# Patient Record
Sex: FEMALE | Race: WHITE | NOT HISPANIC OR LATINO | Employment: OTHER | URBAN - METROPOLITAN AREA
[De-identification: names, ages, dates, MRNs, and addresses within clinical notes are randomized per-mention and may not be internally consistent; named-entity substitution may affect disease eponyms.]

---

## 2017-06-01 ENCOUNTER — HOSPITAL ENCOUNTER (OUTPATIENT)
Dept: CARDIOLOGY | Facility: HOSPITAL | Age: 82
Discharge: HOME | End: 2017-06-01
Attending: PHYSICIAN ASSISTANT | Admitting: PHYSICIAN ASSISTANT

## 2019-06-17 PROBLEM — K21.9 GASTROESOPHAGEAL REFLUX DISEASE: Status: ACTIVE | Noted: 2019-06-17

## 2019-06-17 PROBLEM — R60.0 EDEMA OF LOWER EXTREMITY: Status: ACTIVE | Noted: 2017-05-17

## 2019-06-17 PROBLEM — F41.9 ANXIETY DISORDER: Status: ACTIVE | Noted: 2019-06-17

## 2019-07-25 PROCEDURE — 87186 SC STD MICRODIL/AGAR DIL: CPT | Performed by: NURSE PRACTITIONER

## 2019-07-25 PROCEDURE — 87086 URINE CULTURE/COLONY COUNT: CPT | Performed by: NURSE PRACTITIONER

## 2019-07-25 PROCEDURE — 87088 URINE BACTERIA CULTURE: CPT | Performed by: NURSE PRACTITIONER

## 2019-09-11 ENCOUNTER — TELEPHONE (OUTPATIENT)
Dept: FAMILY MEDICINE CLINIC | Facility: CLINIC | Age: 84
End: 2019-09-11

## 2019-09-11 NOTE — TELEPHONE ENCOUNTER
DAUGHTER, IDA, IS ASKING IF YOU WOULD ACCEPT LEVI AS PATIENT. SHE STATES SHE TEXTED YOU ABOUT THIS. THANK YOU.

## 2019-09-11 NOTE — TELEPHONE ENCOUNTER
Lm on Luz Elena's cell that he will accept as patient and to call office to schedule new patient establishment appt.

## 2019-10-06 ENCOUNTER — LAB REQUISITION (OUTPATIENT)
Dept: LAB | Facility: HOSPITAL | Age: 84
End: 2019-10-06

## 2019-10-06 DIAGNOSIS — Z00.00 ENCOUNTER FOR GENERAL ADULT MEDICAL EXAMINATION WITHOUT ABNORMAL FINDINGS: ICD-10-CM

## 2019-10-06 PROCEDURE — 87205 SMEAR GRAM STAIN: CPT | Performed by: INTERNAL MEDICINE

## 2019-10-06 PROCEDURE — 87186 SC STD MICRODIL/AGAR DIL: CPT | Performed by: INTERNAL MEDICINE

## 2019-10-06 PROCEDURE — 87070 CULTURE OTHR SPECIMN AEROBIC: CPT | Performed by: INTERNAL MEDICINE

## 2019-10-07 ENCOUNTER — HOSPITAL ENCOUNTER (OUTPATIENT)
Facility: HOSPITAL | Age: 84
Setting detail: OBSERVATION
Discharge: HOME HEALTH | End: 2019-10-08
Attending: EMERGENCY MEDICINE | Admitting: HOSPITALIST

## 2019-10-07 DIAGNOSIS — L03.115 CELLULITIS OF RIGHT LOWER EXTREMITY: Primary | ICD-10-CM

## 2019-10-07 DIAGNOSIS — N17.0 ACUTE KIDNEY INJURY (AKI) WITH ACUTE TUBULAR NECROSIS (ATN) (HCC): ICD-10-CM

## 2019-10-07 PROBLEM — F41.9 ANXIETY DISORDER: Chronic | Status: ACTIVE | Noted: 2019-06-17

## 2019-10-07 PROBLEM — R60.0 EDEMA OF LOWER EXTREMITY: Chronic | Status: ACTIVE | Noted: 2017-05-17

## 2019-10-07 PROBLEM — K21.9 GASTROESOPHAGEAL REFLUX DISEASE: Chronic | Status: ACTIVE | Noted: 2019-06-17

## 2019-10-07 LAB
ALBUMIN SERPL-MCNC: 3.4 G/DL (ref 3.5–4.8)
ALBUMIN/GLOB SERPL: 1 G/DL (ref 1–1.7)
ALP SERPL-CCNC: 58 U/L (ref 32–91)
ALT SERPL W P-5'-P-CCNC: 6 U/L (ref 14–54)
ANION GAP SERPL CALCULATED.3IONS-SCNC: 14.9 MMOL/L (ref 5–15)
AST SERPL-CCNC: 33 U/L (ref 15–41)
B PERT DNA SPEC QL NAA+PROBE: NOT DETECTED
BACTERIA UR QL AUTO: ABNORMAL /HPF
BASOPHILS # BLD AUTO: 0 10*3/MM3 (ref 0–0.2)
BASOPHILS NFR BLD AUTO: 0.2 % (ref 0–1.5)
BILIRUB SERPL-MCNC: 0.7 MG/DL (ref 0.3–1.2)
BILIRUB UR QL STRIP: NEGATIVE
BNP SERPL-MCNC: 80 PG/ML
BUN BLD-MCNC: 29 MG/DL (ref 8–20)
BUN/CREAT SERPL: 19.3 (ref 5.4–26.2)
C PNEUM DNA NPH QL NAA+NON-PROBE: NOT DETECTED
CALCIUM SPEC-SCNC: 8.7 MG/DL (ref 8.9–10.3)
CHLORIDE SERPL-SCNC: 106 MMOL/L (ref 101–111)
CLARITY UR: CLEAR
CO2 SERPL-SCNC: 26 MMOL/L (ref 22–32)
COLOR UR: ABNORMAL
CREAT BLD-MCNC: 1.5 MG/DL (ref 0.4–1)
D-LACTATE SERPL-SCNC: 1.1 MMOL/L (ref 0.5–2.2)
D-LACTATE SERPL-SCNC: 1.8 MMOL/L (ref 0.5–2)
DEPRECATED RDW RBC AUTO: 54.7 FL (ref 37–54)
EOSINOPHIL # BLD AUTO: 0 10*3/MM3 (ref 0–0.4)
EOSINOPHIL NFR BLD AUTO: 0.1 % (ref 0.3–6.2)
ERYTHROCYTE [DISTWIDTH] IN BLOOD BY AUTOMATED COUNT: 16.2 % (ref 12.3–15.4)
FLUAV H1 2009 PAND RNA NPH QL NAA+PROBE: NOT DETECTED
FLUAV H1 HA GENE NPH QL NAA+PROBE: NOT DETECTED
FLUAV H3 RNA NPH QL NAA+PROBE: NOT DETECTED
FLUAV SUBTYP SPEC NAA+PROBE: NOT DETECTED
FLUBV RNA ISLT QL NAA+PROBE: NOT DETECTED
GFR SERPL CREATININE-BSD FRML MDRD: 33 ML/MIN/1.73
GLOBULIN UR ELPH-MCNC: 3.3 GM/DL (ref 2.5–3.8)
GLUCOSE BLD-MCNC: 134 MG/DL (ref 65–99)
GLUCOSE UR STRIP-MCNC: NEGATIVE MG/DL
HADV DNA SPEC NAA+PROBE: NOT DETECTED
HCOV 229E RNA SPEC QL NAA+PROBE: NOT DETECTED
HCOV HKU1 RNA SPEC QL NAA+PROBE: NOT DETECTED
HCOV NL63 RNA SPEC QL NAA+PROBE: NOT DETECTED
HCOV OC43 RNA SPEC QL NAA+PROBE: NOT DETECTED
HCT VFR BLD AUTO: 27.5 % (ref 34–46.6)
HGB BLD-MCNC: 9.5 G/DL (ref 12–15.9)
HGB UR QL STRIP.AUTO: ABNORMAL
HMPV RNA NPH QL NAA+NON-PROBE: NOT DETECTED
HOLD SPECIMEN: NORMAL
HOLD SPECIMEN: NORMAL
HPIV1 RNA SPEC QL NAA+PROBE: NOT DETECTED
HPIV2 RNA SPEC QL NAA+PROBE: NOT DETECTED
HPIV3 RNA NPH QL NAA+PROBE: NOT DETECTED
HPIV4 P GENE NPH QL NAA+PROBE: NOT DETECTED
HYALINE CASTS UR QL AUTO: ABNORMAL /LPF
KETONES UR QL STRIP: NEGATIVE
LEUKOCYTE ESTERASE UR QL STRIP.AUTO: ABNORMAL
LYMPHOCYTES # BLD AUTO: 0.4 10*3/MM3 (ref 0.7–3.1)
LYMPHOCYTES NFR BLD AUTO: 4.1 % (ref 19.6–45.3)
M PNEUMO IGG SER IA-ACNC: NOT DETECTED
MCH RBC QN AUTO: 32.6 PG (ref 26.6–33)
MCHC RBC AUTO-ENTMCNC: 34.4 G/DL (ref 31.5–35.7)
MCV RBC AUTO: 94.7 FL (ref 79–97)
MONOCYTES # BLD AUTO: 0.5 10*3/MM3 (ref 0.1–0.9)
MONOCYTES NFR BLD AUTO: 5.4 % (ref 5–12)
NEUTROPHILS # BLD AUTO: 8.9 10*3/MM3 (ref 1.7–7)
NEUTROPHILS NFR BLD AUTO: 90.2 % (ref 42.7–76)
NITRITE UR QL STRIP: NEGATIVE
NRBC BLD AUTO-RTO: 0 /100 WBC (ref 0–0.2)
PH UR STRIP.AUTO: 5.5 [PH] (ref 5–8)
PLATELET # BLD AUTO: 177 10*3/MM3 (ref 140–450)
PMV BLD AUTO: 7.9 FL (ref 6–12)
POTASSIUM BLD-SCNC: 4.9 MMOL/L (ref 3.6–5.1)
PROT SERPL-MCNC: 6.7 G/DL (ref 6.1–7.9)
PROT UR QL STRIP: ABNORMAL
RBC # BLD AUTO: 2.9 10*6/MM3 (ref 3.77–5.28)
RBC # UR: ABNORMAL /HPF
REF LAB TEST METHOD: ABNORMAL
RHINOVIRUS RNA SPEC NAA+PROBE: NOT DETECTED
RSV RNA NPH QL NAA+NON-PROBE: NOT DETECTED
SODIUM BLD-SCNC: 142 MMOL/L (ref 136–144)
SP GR UR STRIP: 1.02 (ref 1–1.03)
SQUAMOUS #/AREA URNS HPF: ABNORMAL /HPF
UROBILINOGEN UR QL STRIP: ABNORMAL
WBC NRBC COR # BLD: 9.9 10*3/MM3 (ref 3.4–10.8)
WBC UR QL AUTO: ABNORMAL /HPF
WHOLE BLOOD HOLD SPECIMEN: NORMAL
WHOLE BLOOD HOLD SPECIMEN: NORMAL

## 2019-10-07 PROCEDURE — 25010000002 VANCOMYCIN 10 G RECONSTITUTED SOLUTION: Performed by: EMERGENCY MEDICINE

## 2019-10-07 PROCEDURE — 90686 IIV4 VACC NO PRSV 0.5 ML IM: CPT | Performed by: HOSPITALIST

## 2019-10-07 PROCEDURE — 81001 URINALYSIS AUTO W/SCOPE: CPT | Performed by: EMERGENCY MEDICINE

## 2019-10-07 PROCEDURE — 96361 HYDRATE IV INFUSION ADD-ON: CPT

## 2019-10-07 PROCEDURE — 96366 THER/PROPH/DIAG IV INF ADDON: CPT

## 2019-10-07 PROCEDURE — 96365 THER/PROPH/DIAG IV INF INIT: CPT

## 2019-10-07 PROCEDURE — 0099U HC BIOFIRE FILMARRAY RESP PANEL 1: CPT | Performed by: HOSPITALIST

## 2019-10-07 PROCEDURE — G0378 HOSPITAL OBSERVATION PER HR: HCPCS

## 2019-10-07 PROCEDURE — 99283 EMERGENCY DEPT VISIT LOW MDM: CPT

## 2019-10-07 PROCEDURE — P9612 CATHETERIZE FOR URINE SPEC: HCPCS

## 2019-10-07 PROCEDURE — 96372 THER/PROPH/DIAG INJ SC/IM: CPT

## 2019-10-07 PROCEDURE — G0008 ADMIN INFLUENZA VIRUS VAC: HCPCS | Performed by: HOSPITALIST

## 2019-10-07 PROCEDURE — 83880 ASSAY OF NATRIURETIC PEPTIDE: CPT | Performed by: NURSE PRACTITIONER

## 2019-10-07 PROCEDURE — 99220 PR INITIAL OBSERVATION CARE/DAY 70 MINUTES: CPT | Performed by: HOSPITALIST

## 2019-10-07 PROCEDURE — 87040 BLOOD CULTURE FOR BACTERIA: CPT | Performed by: EMERGENCY MEDICINE

## 2019-10-07 PROCEDURE — 83605 ASSAY OF LACTIC ACID: CPT

## 2019-10-07 PROCEDURE — 25010000002 PIPERACILLIN SOD-TAZOBACTAM PER 1 G: Performed by: EMERGENCY MEDICINE

## 2019-10-07 PROCEDURE — 96367 TX/PROPH/DG ADDL SEQ IV INF: CPT

## 2019-10-07 PROCEDURE — 83605 ASSAY OF LACTIC ACID: CPT | Performed by: NURSE PRACTITIONER

## 2019-10-07 PROCEDURE — 25010000002 INFLUENZA VAC SPLIT QUAD 0.5 ML SUSPENSION PREFILLED SYRINGE: Performed by: HOSPITALIST

## 2019-10-07 PROCEDURE — 25010000002 ENOXAPARIN PER 10 MG: Performed by: NURSE PRACTITIONER

## 2019-10-07 PROCEDURE — 85025 COMPLETE CBC W/AUTO DIFF WBC: CPT | Performed by: EMERGENCY MEDICINE

## 2019-10-07 PROCEDURE — 80053 COMPREHEN METABOLIC PANEL: CPT | Performed by: EMERGENCY MEDICINE

## 2019-10-07 PROCEDURE — 25010000002 CEFEPIME PER 500 MG: Performed by: HOSPITALIST

## 2019-10-07 RX ORDER — SENNOSIDES 8.6 MG
1 TABLET ORAL 2 TIMES DAILY
COMMUNITY

## 2019-10-07 RX ORDER — ACETAMINOPHEN 160 MG/5ML
650 SOLUTION ORAL EVERY 4 HOURS PRN
Status: DISCONTINUED | OUTPATIENT
Start: 2019-10-07 | End: 2019-10-08 | Stop reason: HOSPADM

## 2019-10-07 RX ORDER — SODIUM CHLORIDE 9 MG/ML
75 INJECTION, SOLUTION INTRAVENOUS ONCE
Status: COMPLETED | OUTPATIENT
Start: 2019-10-07 | End: 2019-10-07

## 2019-10-07 RX ORDER — HYDROCODONE BITARTRATE AND ACETAMINOPHEN 10; 325 MG/1; MG/1
1 TABLET ORAL EVERY 6 HOURS
COMMUNITY

## 2019-10-07 RX ORDER — PREGABALIN 50 MG/1
50 CAPSULE ORAL 2 TIMES DAILY
Status: ON HOLD | COMMUNITY
End: 2022-10-23

## 2019-10-07 RX ORDER — PANTOPRAZOLE SODIUM 40 MG/1
40 TABLET, DELAYED RELEASE ORAL
Status: DISCONTINUED | OUTPATIENT
Start: 2019-10-07 | End: 2019-10-08 | Stop reason: HOSPADM

## 2019-10-07 RX ORDER — PREGABALIN 75 MG/1
CAPSULE ORAL
Status: ON HOLD | COMMUNITY
Start: 2011-10-24 | End: 2019-10-07

## 2019-10-07 RX ORDER — ERGOCALCIFEROL 1.25 MG/1
50000 CAPSULE ORAL WEEKLY
COMMUNITY

## 2019-10-07 RX ORDER — BACLOFEN 10 MG/1
5 TABLET ORAL
Status: ON HOLD | COMMUNITY
End: 2022-10-23

## 2019-10-07 RX ORDER — LEVOTHYROXINE SODIUM 137 UG/1
137 TABLET ORAL DAILY
COMMUNITY

## 2019-10-07 RX ORDER — SIMETHICONE 80 MG
80 TABLET,CHEWABLE ORAL EVERY 6 HOURS PRN
Status: DISCONTINUED | OUTPATIENT
Start: 2019-10-07 | End: 2019-10-08 | Stop reason: HOSPADM

## 2019-10-07 RX ORDER — BACLOFEN 10 MG/1
5 TABLET ORAL 3 TIMES DAILY
Status: DISCONTINUED | OUTPATIENT
Start: 2019-10-07 | End: 2019-10-08 | Stop reason: HOSPADM

## 2019-10-07 RX ORDER — SODIUM CHLORIDE 9 MG/ML
75 INJECTION, SOLUTION INTRAVENOUS CONTINUOUS
Status: DISCONTINUED | OUTPATIENT
Start: 2019-10-07 | End: 2019-10-08 | Stop reason: HOSPADM

## 2019-10-07 RX ORDER — ROPINIROLE 3 MG/1
3 TABLET, FILM COATED ORAL 2 TIMES DAILY
COMMUNITY
End: 2022-10-25 | Stop reason: HOSPADM

## 2019-10-07 RX ORDER — SODIUM CHLORIDE 0.9 % (FLUSH) 0.9 %
10 SYRINGE (ML) INJECTION AS NEEDED
Status: DISCONTINUED | OUTPATIENT
Start: 2019-10-07 | End: 2019-10-08 | Stop reason: HOSPADM

## 2019-10-07 RX ORDER — HYDROCODONE BITARTRATE AND ACETAMINOPHEN 7.5; 325 MG/1; MG/1
1 TABLET ORAL EVERY 6 HOURS PRN
Status: DISCONTINUED | OUTPATIENT
Start: 2019-10-07 | End: 2019-10-08 | Stop reason: HOSPADM

## 2019-10-07 RX ORDER — LANOLIN ALCOHOL/MO/W.PET/CERES
1000 CREAM (GRAM) TOPICAL DAILY
Status: DISCONTINUED | OUTPATIENT
Start: 2019-10-07 | End: 2019-10-08 | Stop reason: HOSPADM

## 2019-10-07 RX ORDER — CHOLECALCIFEROL (VITAMIN D3) 125 MCG
5 CAPSULE ORAL NIGHTLY
COMMUNITY
End: 2020-10-11 | Stop reason: HOSPADM

## 2019-10-07 RX ORDER — FAMOTIDINE 20 MG/1
20 TABLET, FILM COATED ORAL DAILY
Status: DISCONTINUED | OUTPATIENT
Start: 2019-10-07 | End: 2019-10-07

## 2019-10-07 RX ORDER — RANITIDINE HCL 75 MG
75 TABLET ORAL 2 TIMES DAILY
COMMUNITY
End: 2020-07-22

## 2019-10-07 RX ORDER — FLUTICASONE PROPIONATE 50 MCG
2 SPRAY, SUSPENSION (ML) NASAL DAILY
Status: DISCONTINUED | OUTPATIENT
Start: 2019-10-07 | End: 2019-10-08 | Stop reason: HOSPADM

## 2019-10-07 RX ORDER — ACETAMINOPHEN 325 MG/1
650 TABLET ORAL EVERY 4 HOURS PRN
Status: DISCONTINUED | OUTPATIENT
Start: 2019-10-07 | End: 2019-10-08 | Stop reason: HOSPADM

## 2019-10-07 RX ORDER — FLUTICASONE PROPIONATE 50 MCG
1 SPRAY, SUSPENSION (ML) NASAL DAILY
Status: ON HOLD | COMMUNITY
End: 2022-10-23

## 2019-10-07 RX ORDER — ASCORBIC ACID 500 MG
500 TABLET ORAL DAILY
Status: ON HOLD | COMMUNITY
End: 2022-10-23

## 2019-10-07 RX ORDER — LUBIPROSTONE 24 UG/1
24 CAPSULE ORAL 2 TIMES DAILY WITH MEALS
COMMUNITY

## 2019-10-07 RX ORDER — FUROSEMIDE 40 MG/1
40 TABLET ORAL
Status: DISCONTINUED | OUTPATIENT
Start: 2019-10-07 | End: 2019-10-07

## 2019-10-07 RX ORDER — CETIRIZINE HYDROCHLORIDE 10 MG/1
10 TABLET ORAL
COMMUNITY

## 2019-10-07 RX ORDER — ONDANSETRON 4 MG/1
4 TABLET, FILM COATED ORAL EVERY 6 HOURS PRN
Status: DISCONTINUED | OUTPATIENT
Start: 2019-10-07 | End: 2019-10-08 | Stop reason: HOSPADM

## 2019-10-07 RX ORDER — FUROSEMIDE 40 MG/1
40 TABLET ORAL DAILY
Status: ON HOLD | COMMUNITY
End: 2019-10-08 | Stop reason: SDUPTHER

## 2019-10-07 RX ORDER — SODIUM CHLORIDE 0.9 % (FLUSH) 0.9 %
10 SYRINGE (ML) INJECTION EVERY 12 HOURS SCHEDULED
Status: DISCONTINUED | OUTPATIENT
Start: 2019-10-07 | End: 2019-10-08 | Stop reason: HOSPADM

## 2019-10-07 RX ORDER — ONDANSETRON 2 MG/ML
4 INJECTION INTRAMUSCULAR; INTRAVENOUS EVERY 6 HOURS PRN
Status: DISCONTINUED | OUTPATIENT
Start: 2019-10-07 | End: 2019-10-08 | Stop reason: HOSPADM

## 2019-10-07 RX ORDER — FENOFIBRATE 145 MG/1
145 TABLET, COATED ORAL DAILY
COMMUNITY

## 2019-10-07 RX ORDER — ROPINIROLE 1 MG/1
3 TABLET, FILM COATED ORAL NIGHTLY
Status: DISCONTINUED | OUTPATIENT
Start: 2019-10-07 | End: 2019-10-08 | Stop reason: HOSPADM

## 2019-10-07 RX ORDER — ACETAMINOPHEN 650 MG/1
650 SUPPOSITORY RECTAL EVERY 4 HOURS PRN
Status: DISCONTINUED | OUTPATIENT
Start: 2019-10-07 | End: 2019-10-08 | Stop reason: HOSPADM

## 2019-10-07 RX ORDER — ALLOPURINOL 100 MG/1
100 TABLET ORAL DAILY
COMMUNITY

## 2019-10-07 RX ORDER — ALLOPURINOL 100 MG/1
100 TABLET ORAL DAILY
Status: DISCONTINUED | OUTPATIENT
Start: 2019-10-07 | End: 2019-10-08 | Stop reason: HOSPADM

## 2019-10-07 RX ORDER — POTASSIUM CHLORIDE 20 MEQ/1
20 TABLET, EXTENDED RELEASE ORAL 2 TIMES DAILY
Status: ON HOLD | COMMUNITY
End: 2019-10-08 | Stop reason: SDUPTHER

## 2019-10-07 RX ORDER — DIAPER,BRIEF,INFANT-TODD,DISP
EACH MISCELLANEOUS
Status: DISCONTINUED | OUTPATIENT
Start: 2019-10-07 | End: 2019-10-08 | Stop reason: HOSPADM

## 2019-10-07 RX ORDER — BACLOFEN 10 MG/1
5 TABLET ORAL 3 TIMES DAILY
Status: ON HOLD | COMMUNITY
End: 2019-10-07

## 2019-10-07 RX ORDER — LANOLIN ALCOHOL/MO/W.PET/CERES
1000 CREAM (GRAM) TOPICAL DAILY
COMMUNITY
End: 2020-10-09

## 2019-10-07 RX ORDER — PREGABALIN 25 MG/1
25 CAPSULE ORAL DAILY
Status: DISCONTINUED | OUTPATIENT
Start: 2019-10-07 | End: 2019-10-07

## 2019-10-07 RX ORDER — PREGABALIN 25 MG/1
50 CAPSULE ORAL EVERY 12 HOURS SCHEDULED
Status: DISCONTINUED | OUTPATIENT
Start: 2019-10-07 | End: 2019-10-08 | Stop reason: HOSPADM

## 2019-10-07 RX ORDER — POLYETHYLENE GLYCOL 3350 17 G/17G
17 POWDER, FOR SOLUTION ORAL EVERY MORNING
COMMUNITY

## 2019-10-07 RX ORDER — SIMETHICONE 80 MG
80 TABLET,CHEWABLE ORAL EVERY 6 HOURS PRN
COMMUNITY

## 2019-10-07 RX ORDER — SODIUM CHLORIDE 9 MG/ML
100 INJECTION, SOLUTION INTRAVENOUS CONTINUOUS
Status: DISCONTINUED | OUTPATIENT
Start: 2019-10-07 | End: 2019-10-07

## 2019-10-07 RX ORDER — HYDROCODONE BITARTRATE AND ACETAMINOPHEN 10; 325 MG/1; MG/1
1 TABLET ORAL EVERY 6 HOURS PRN
Status: DISCONTINUED | OUTPATIENT
Start: 2019-10-07 | End: 2019-10-07

## 2019-10-07 RX ORDER — LEVOTHYROXINE SODIUM 0.05 MG/1
50 TABLET ORAL
Status: DISCONTINUED | OUTPATIENT
Start: 2019-10-07 | End: 2019-10-07

## 2019-10-07 RX ADMIN — HYDROCODONE BITARTRATE AND ACETAMINOPHEN 1 TABLET: 7.5; 325 TABLET ORAL at 10:57

## 2019-10-07 RX ADMIN — PREGABALIN 50 MG: 25 CAPSULE ORAL at 21:32

## 2019-10-07 RX ADMIN — CEFEPIME HYDROCHLORIDE 1 G: 1 INJECTION, POWDER, FOR SOLUTION INTRAMUSCULAR; INTRAVENOUS at 11:13

## 2019-10-07 RX ADMIN — ROPINIROLE 3 MG: 1 TABLET, FILM COATED ORAL at 21:32

## 2019-10-07 RX ADMIN — SODIUM CHLORIDE 100 ML/HR: 0.9 INJECTION, SOLUTION INTRAVENOUS at 06:29

## 2019-10-07 RX ADMIN — Medication 10 ML: at 21:32

## 2019-10-07 RX ADMIN — ALLOPURINOL 100 MG: 100 TABLET ORAL at 10:58

## 2019-10-07 RX ADMIN — SODIUM CHLORIDE 75 ML/HR: 0.9 INJECTION, SOLUTION INTRAVENOUS at 09:32

## 2019-10-07 RX ADMIN — BACLOFEN 5 MG: 10 TABLET ORAL at 10:58

## 2019-10-07 RX ADMIN — ACETAMINOPHEN 650 MG: 325 TABLET ORAL at 21:32

## 2019-10-07 RX ADMIN — CYANOCOBALAMIN TAB 1000 MCG 1000 MCG: 1000 TAB at 10:57

## 2019-10-07 RX ADMIN — VANCOMYCIN HYDROCHLORIDE 1250 MG: 10 INJECTION, POWDER, LYOPHILIZED, FOR SOLUTION INTRAVENOUS at 05:04

## 2019-10-07 RX ADMIN — INFLUENZA A VIRUS A/BRISBANE/02/2018 IVR-190 (H1N1) ANTIGEN (PROPIOLACTONE INACTIVATED), INFLUENZA A VIRUS A/KANSAS/14/2017 X-327 (H3N2) ANTIGEN (PROPIOLACTONE INACTIVATED), INFLUENZA B VIRUS B/MARYLAND/15/2016 ANTIGEN (PROPIOLACTONE INACTIVATED), INFLUENZA B VIRUS B/PHUKET/3073/2013 BVR-1B ANTIGEN (PROPIOLACTONE INACTIVATED) 0.5 ML: 15; 15; 15; 15 INJECTION, SUSPENSION INTRAMUSCULAR at 10:55

## 2019-10-07 RX ADMIN — BACLOFEN 5 MG: 10 TABLET ORAL at 21:32

## 2019-10-07 RX ADMIN — ENOXAPARIN SODIUM 30 MG: 30 INJECTION SUBCUTANEOUS at 17:17

## 2019-10-07 RX ADMIN — MAGNESIUM OXIDE TAB 400 MG (241.3 MG ELEMENTAL MG) 400 MG: 400 (241.3 MG) TAB at 10:57

## 2019-10-07 RX ADMIN — SODIUM CHLORIDE 40 ML/HR: 0.9 INJECTION, SOLUTION INTRAVENOUS at 11:11

## 2019-10-07 RX ADMIN — PANTOPRAZOLE SODIUM 40 MG: 40 TABLET, DELAYED RELEASE ORAL at 10:57

## 2019-10-07 RX ADMIN — CARBIDOPA AND LEVODOPA 1 TABLET: 25; 100 TABLET ORAL at 10:58

## 2019-10-07 RX ADMIN — CARBIDOPA AND LEVODOPA 1 TABLET: 25; 100 TABLET ORAL at 21:32

## 2019-10-07 RX ADMIN — Medication 10 ML: at 09:31

## 2019-10-07 RX ADMIN — PIPERACILLIN AND TAZOBACTAM 3.38 G: 3; .375 INJECTION, POWDER, LYOPHILIZED, FOR SOLUTION INTRAVENOUS at 04:36

## 2019-10-07 RX ADMIN — PREGABALIN 50 MG: 25 CAPSULE ORAL at 10:56

## 2019-10-07 RX ADMIN — SODIUM CHLORIDE 1000 ML: 900 INJECTION, SOLUTION INTRAVENOUS at 04:19

## 2019-10-07 NOTE — ED NOTES
Pt requesting pain medication for her legs.  Discussed with provider and no orders given at this time due to blood pressure. Family and patient notified     Franca Pope RN  10/07/19 0511

## 2019-10-07 NOTE — H&P
"      [facility] Medicine Services            Primary Care Provider:  Leobardo Kim APRN    Patient Care Team:  Leobardo Kim APRN as PCP - General    CHIEF COMPLAINT: \"I have a wound behind my knee.  How do I order my breakfast?\"    Chief Complaint   Patient presents with   • Wound Check     HISTORY OF PRESENT ILLNESS:    Ms. Owens is an 84 year old female with a past medical history significant for Gout, chronic depression, chronic pain with narcotic dependency, chronic HTN, asthma, Parkinson's disease,chronic constipation, hypothyroidism, GERD, anemia, Fibromyalgia, arthritis, diastolic heart failure with preserved EF, chronic venous stasis, and wheelchair dependent.      She reported to the ER today with complaints of a wound behind her knee.  At first she indicated it was behind her left knee, but I could not appreciate any type of wound.  She then said well no, its on my right leg.  I told her that I noted chronic changes to her lower extremities, but did not appreciate any type of open wound.  Additionally, she can not tell me if she was seen by her PCP or if she has been on any antibiotics up to this point.  According to ER notes, she was placed on a course of Clindamycin for suspected cellulitis.      Work up in the ER showed creatinine 1.5, WBC 9.9, Lactic 1.8. Urinalysis showed hematuria, leukocytosis, and proteinuria.  She was treated for a UTI in July with a culture positive for E.coli.  She denies any dysuria, fevers, chills, nausea, or diarrhea.  She is very sleepy and is having a hard time holding a conversation.  She states she is \"just tired.\"  Past admission showed hypercapnic respiratory failure thought to be due to polypharmacy.  She was requesting additional pain medication in the ER which she did not receive secondary to some noted hypotension.  Systolic's were in the low 100's.     She received a dose of Vancomycin and Zosyn for suspected cellulitis in " the ER.  She will be admitted for further work up and evaluation.       Past Medical History:   Diagnosis Date   • Acute kidney injury (MIRI) with acute tubular necrosis (ATN) (CMS/HCC) 10/7/2019   • Anemia 4/4/2013   • Anxiety disorder 6/17/2019   • Cellulitis of right lower extremity 10/7/2019   • Depression 1/23/2013   • Edema of lower extremity 5/17/2017   • Fibromyalgia 7/19/2013   • Gastroesophageal reflux disease 6/17/2019   • Hemorrhoids 4/3/2012   • Hyperlipidemia 7/19/2013   • Hypothyroidism 7/19/2013   • Low back pain 2/24/2016   • Peripheral venous insufficiency 9/26/2011   • Polyosteoarthritis 12/5/2011   • Sleep apnea 6/21/2013   • Wheelchair dependence 7/19/2013       No past surgical history on file.    No family history on file.    Social History     Tobacco Use   • Smoking status: Never Smoker   • Smokeless tobacco: Never Used   Substance Use Topics   • Alcohol use: No     Frequency: Never   • Drug use: No       Medications Prior to Admission   Medication Sig Dispense Refill Last Dose   • allopurinol (ZYLOPRIM) 100 MG tablet Take 100 mg by mouth Daily.      • baclofen (LIORESAL) 10 MG tablet Take 10 mg by mouth 3 (Three) Times a Day.      • cetirizine (zyrTEC) 10 MG tablet Take 10 mg by mouth Daily.      • fenofibrate (TRICOR) 145 MG tablet Take 145 mg by mouth Daily.      • fluticasone (FLONASE) 50 MCG/ACT nasal spray 2 sprays into the nostril(s) as directed by provider Daily.      • furosemide (LASIX) 40 MG tablet Take 40 mg by mouth 2 (Two) Times a Day.      • lubiprostone (AMITIZA) 24 MCG capsule Take 24 mcg by mouth 2 (Two) Times a Day With Meals.      • magnesium oxide (MAGOX) 400 (241.3 Mg) MG tablet tablet Take 400 mg by mouth Daily.      • melatonin 5 MG tablet tablet Take 5 mg by mouth.      • polyethylene glycol (MIRALAX) packet Take 17 g by mouth Daily.      • potassium chloride (K-DUR,KLOR-CON) 20 MEQ CR tablet Take 20 mEq by mouth 2 (Two) Times a Day.      • pregabalin (LYRICA) 75 MG  "capsule LYRICA 75 MG CAPS      • raNITIdine (ZANTAC) 75 MG tablet Take 75 mg by mouth 2 (Two) Times a Day.      • rOPINIRole (REQUIP) 4 MG tablet Take 3 mg by mouth Every Night.      • senna (SENOKOT) 8.6 MG tablet tablet Take  by mouth Every Night.      • simethicone (MYLICON) 80 MG chewable tablet Chew 80 mg Every 6 (Six) Hours As Needed for Flatulence.      • vitamin B-12 (CYANOCOBALAMIN) 1000 MCG tablet Take 1,000 mcg by mouth Daily.      • vitamin C (ASCORBIC ACID) 500 MG tablet Take 500 mg by mouth Daily.      • vitamin D (ERGOCALCIFEROL) 81488 units capsule capsule Take 50,000 Units by mouth 1 (One) Time Per Week.      • carbidopa-levodopa (SINEMET)  MG per tablet SINEMET  MG TABS      • DULoxetine (CYMBALTA) 60 MG capsule CYMBALTA 60 MG CPEP      • esomeprazole (NexIUM) 40 MG packet NEXIUM PACK      • ferrous gluconate (FERGON) 324 MG tablet FERROUS GLUCONATE 324 (38 Fe) MG TABS      • HYDROcodone-acetaminophen (NORCO)  MG per tablet NORCO  MG TABS      • levothyroxine (SYNTHROID) 100 MCG tablet SYNTHROID 100 MCG TABS      • Levothyroxine Sodium (SYNTHROID PO) SYNTHROID TABS      • mupirocin (BACTROBAN) 2 % cream Apply  topically to the appropriate area as directed 3 (Three) Times a Day. 30 g 0    • pregabalin (LYRICA) 100 MG capsule LYRICA CAPS          Allergies:  Latex    Immunization History   Administered Date(s) Administered   • TD Preservative Free 06/17/2019           REVIEW OF SYSTEMS:     Review of Systems   Constitution: Positive for malaise/fatigue.   Skin: Positive for color change and poor wound healing.   All other systems reviewed and are negative.      Vital Signs  Temp:  [98.1 °F (36.7 °C)-99.3 °F (37.4 °C)] 98.1 °F (36.7 °C)  Heart Rate:  [85-98] 85  Resp:  [16-18] 16  BP: (100-120)/(50-61) 100/50    Flowsheet Rows      First Filed Value   Admission Height  152.4 cm (60\") Documented at 10/07/2019 0228   Admission Weight  90.7 kg (200 lb) Documented at 10/07/2019 " 0228           Physical Exam:    Physical Exam   Constitutional: She appears well-developed.   HENT:   Head: Normocephalic and atraumatic.   Eyes: EOM are normal. Pupils are equal, round, and reactive to light.   Neck: Normal range of motion.   Cardiovascular: Normal rate and regular rhythm.   Pulmonary/Chest: Effort normal and breath sounds normal.   Abdominal: Soft. Bowel sounds are normal.   Musculoskeletal: Normal range of motion. She exhibits edema.   Neurological:   Sleepy, but arousable   Skin: Skin is warm.   Face appears sweaty. Lower extremity darkening of the skin with some skin tears to the right shin.  Left leg cut she states she got from her wheelchair.          Results Review:      I reviewed the patient's new clinical results.    Lab Results (most recent)     Procedure Component Value Units Date/Time    Urinalysis With Culture If Indicated - Urine, Catheter [[id number]]  (Abnormal) Collected:  10/07/19 0534    Specimen:  Urine, Catheter Updated:  10/07/19 0557     Color, UA Dark Yellow     Appearance, UA Clear     pH, UA 5.5     Specific Gravity, UA 1.022     Glucose, UA Negative     Ketones, UA Negative     Bilirubin, UA Negative     Blood, UA Small (1+)     Protein, UA Trace     Leuk Esterase, UA Trace     Nitrite, UA Negative     Urobilinogen, UA 1.0 E.U./dL    Urinalysis, Microscopic Only - Urine, Catheter [[id number]]  (Abnormal) Collected:  10/07/19 0534    Specimen:  Urine, Catheter Updated:  10/07/19 0557     RBC, UA 0-2 /HPF      WBC, UA 3-5 /HPF      Bacteria, UA Trace /HPF      Squamous Epithelial Cells, UA 7-12 /HPF      Hyaline Casts, UA 3-6 /LPF      Methodology Automated Microscopy    [place] Draw [[id number]] Collected:  10/07/19 0418    Specimen:  Blood Updated:  10/07/19 0530    Narrative:       The following orders were created for panel order [place] Draw.  Procedure                               Abnormality         Status                     ---------                                -----------         ------                     Light Blue Top[[id number]]                                   Final result               Green Top (Gel)[[id number]]                                  Final result               Lavender Top[[id number]]                                     Final result               Gold Top - SST[[id number]]                                   Final result                 Please view results for these tests on the individual orders.    Light Blue Top [[id number]] Collected:  10/07/19 0418    Specimen:  Blood Updated:  10/07/19 0530     Extra Tube hold for add-on     Comment: Auto resulted       Green Top (Gel) [[id number]] Collected:  10/07/19 0418    Specimen:  Blood Updated:  10/07/19 0530     Extra Tube Hold for add-ons.     Comment: Auto resulted.       Lavender Top [[id number]] Collected:  10/07/19 0418    Specimen:  Blood Updated:  10/07/19 0530     Extra Tube hold for add-on     Comment: Auto resulted       Gold Top - SST [[id number]] Collected:  10/07/19 0418    Specimen:  Blood Updated:  10/07/19 0530     Extra Tube Hold for add-ons.     Comment: Auto resulted.       Blood Culture - Blood, Wrist, Left [[id number]] Collected:  10/07/19 0418    Specimen:  Blood from Wrist, Left Updated:  10/07/19 0502    Comprehensive Metabolic Panel [[id number]]  (Abnormal) Collected:  10/07/19 0418    Specimen:  Blood Updated:  10/07/19 0457     Glucose 134 mg/dL      BUN 29 mg/dL      Creatinine 1.50 mg/dL      Sodium 142 mmol/L      Potassium 4.9 mmol/L      Comment: Specimen hemolyzed.  Results may be affected.        Chloride 106 mmol/L      CO2 26.0 mmol/L      Calcium 8.7 mg/dL      Total Protein 6.7 g/dL      Albumin 3.40 g/dL      ALT (SGPT) 6 U/L      Comment: Specimen hemolyzed.  Results may be affected.        AST (SGOT) 33 U/L      Comment: Specimen hemolyzed.  Results may be affected.        Alkaline Phosphatase 58 U/L      Total Bilirubin 0.7 mg/dL      Comment: Specimen hemolyzed.  Results may  be affected.        eGFR Non African Amer 33 mL/min/1.73      Globulin 3.3 gm/dL      A/G Ratio 1.0 g/dL      BUN/Creatinine Ratio 19.3     Anion Gap 14.9 mmol/L     Narrative:       The MDRD GFR formula is only valid for adults with stable renal function between ages 18 and 70.    CBC & Differential [[id number]] Collected:  10/07/19 0418    Specimen:  Blood Updated:  10/07/19 0429    Narrative:       The following orders were created for panel order CBC & Differential.  Procedure                               Abnormality         Status                     ---------                               -----------         ------                     CBC Auto Differential[[id number]]        Abnormal            Final result                 Please view results for these tests on the individual orders.    CBC Auto Differential [[id number]]  (Abnormal) Collected:  10/07/19 0418    Specimen:  Blood Updated:  10/07/19 0429     WBC 9.90 10*3/mm3      RBC 2.90 10*6/mm3      Hemoglobin 9.5 g/dL      Hematocrit 27.5 %      MCV 94.7 fL      MCH 32.6 pg      MCHC 34.4 g/dL      RDW 16.2 %      RDW-SD 54.7 fl      MPV 7.9 fL      Platelets 177 10*3/mm3      Neutrophil % 90.2 %      Lymphocyte % 4.1 %      Monocyte % 5.4 %      Eosinophil % 0.1 %      Basophil % 0.2 %      Neutrophils, Absolute 8.90 10*3/mm3      Lymphocytes, Absolute 0.40 10*3/mm3      Monocytes, Absolute 0.50 10*3/mm3      Eosinophils, Absolute 0.00 10*3/mm3      Basophils, Absolute 0.00 10*3/mm3      nRBC 0.0 /100 WBC     POC Lactate [[id number]]  (Normal) Collected:  10/07/19 0426    Specimen:  Blood Updated:  10/07/19 0427     Lactate 1.8 mmol/L      Comment: Serial Number: [id number]:  [id number]       Blood Culture - Blood, Arm, Left [[id number]] Collected:  10/07/19 0409    Specimen:  Blood from Arm, Left Updated:  10/07/19 0413          Imaging Results (most recent)     None        reviewed    ECG/EMG Results (most recent)     None        reviewed    Results for  orders placed during the hospital encounter of 18   Duplex Venous Lower Extremity - Bilateral CAR    Narrative                   Lower Extremity Venous Report                          [facility]                         Vascular Laboratory                            [address] [place] [address]    Name: LEVI LEE                  Study Date: 08/15/2018 10:28 AM  MRN: [id number]                       Patient Location: 3A  : 1935 (M/d/yyyy)           Gender: Female  Age: 83 yrs                          Account#: [id number]  Reason For Study: elevated d-dimer      Procedure  Venous study was carried out in bilateral lower extremities. Gray scale, color  flow, and spectral doppler images were obtained. Images were obtained in  transverse and longitudinal views. Technically satisfactory study.    Right Lower Extremity Venous  On the right side the patient has patent common femoral, femoral, and  popliteal veins. The main veins are easily compressible. Femoral vein was  mapped in its entirety through the course of the thigh. It is patent and  compresses well. The popliteal vein is patent along with its tributaries and  compresses well with no evidence of any filling defect. Augmentation test is  positive. Respiratory waves are biphasic. Right posterior tibial vein is  patent. Right peroneal vein not visualized. The right greater and small  saphenous veins are patent with good compressibility.    Left Lower Extremity Venous  On the left side the patient has patent common femoral, femoral, and popliteal  veins. The main veins compress well. Femoral vein was mapped in its entirety  through the course of the thigh. It is patent and compresses well. The  popliteal vein is patent along with its tributaries and compresses well with  no evidence of any filling defect. Augmentation test is positive. Respiratory  waves are biphasic. Left posterior tibial vein is patent. Left  peroneal vein  not visualized. The left greater and small saphenous veins are patent with  good compressibility.      Interpretation Summary  No evidence of any deep vein thrombosis or superficial vein thrombosis.  _______________________________________________________________________________    Electronically signed by: MD Violet Robles  on 08/15/2018 04:43 PM    Ordering Physician: FRANCISCO IBARRA  Referring Physician: LAKESHA BESS  Performed By: OSKAR         Results for orders placed during the hospital encounter of 18   Adult Transthoracic Echo Complete W/ Cont if Necessary Per Protocol    Narrative                           Adult Echocardiogram Report          [facility]  Cardiology Department  [address] [place] [address]  [address]      Name: LEVI LEE       Study Date: 08/15/2018 09:47 AM      BP: 168/79 mmHg  MRN: [id number]            Patient Location:   : 1935           Gender: Female                       Height: 60 in  Age: 83 yrs               Account#: [id number]                Weight: 197 lb  Reason For Study: SOA/WEAKNESS                                 BSA: 1.9 m2  Ordering Physician: FRANCISCO IBARRA  Referring Physician:  LAKESHA BESS  Performed By: AM      M-Mode/2-D Measurements:  LVIDd: 4.7 cm       (3.7-5.7) LVPWd: 1.1 cm        (0.8-1.2)  LVIDs: 3.1 cm       (2.3-3.9)  ACS: 1.8 cm         (1.6-3.7) IVSd: 0.93 cm        (0.7-1.2)  LA dimension: 3.7 cm(1.9-4.0) RVDd: 2.6 cm         (0.7-2.4)  FS: 34.6 %          (21-40%)  Ao root diam: 3.0 cm (2.0-3.7)    Procedure  A 2D, M-mode, color flow and doppler echocardiogram was performed.    Comments  The left ventricle is normal in size. There is normal left ventricular wall  thickness. Left ventricular systolic function is normal. Ejection Fraction is  55-60%. The transmitral spectral Doppler flow pattern is suggestive of  impaired LV relaxation. No regional wall motion abnormalities noted. The right  ventricle is grossly  normal size. Right ventricular function cannot be  assessed due to poor image quality. The left atrial size is normal. The right  atrium is mild to moderately dilated. The mitral valve is grossly normal in  structure. There is trace mitral regurgitation. The tricuspid valve is not  well visualized. Mild aortic valve calcification. Mild valvular aortic  stenosis. Peak/mean ACV gradients are 16/10 mm Hg respectively. Calculated HARLEY  is 1.9 sq cm. No aortic regurgitation is present. The pulmonic valve is not  well visualized. The aortic root is normal size. The pulmonary is not well  visualized. There is no pericardial effusion.      Interpretation  Left ventricular systolic function is normal.  Ejection Fraction is 55-60%  The transmitral spectral Doppler flow pattern is suggestive of impaired LV  relaxation.  The right atrium is mild to moderately dilated.  There is trace mitral regurgitation.  Mild aortic valve calcification.  Mild valvular aortic stenosis.  Peak/mean ACV gradients are 16/10 mm Hg respectively. Calculated HARLEY is 1.9 sq  cm.  The study was technically difficult.      MMode/2D Measurements & Calculations  ESV(Teich): 37.5 ml                  Ao root area: 7.2 cm2  EF(Teich): 63.7 %  LVOT diam: 2.0 cm                    EDV(MOD-sp4): 60.5 ml                                       ESV(MOD-sp4): 27.0 ml                                       EF(MOD-sp4): 55.3 %    Doppler Measurements & Calculations  MV E max gretel: 56.1 cm/sec                MV max P.3 mmHg  MV A max gretel: 151.5 cm/sec               MV mean P.6 mmHg  MV E/A: 0.37  MV dec slope: 567.6 cm/sec2              Ao V2 max: 200.6 cm/sec  MV dec time: 0.20 sec                    Ao max P.1 mmHg                                           Ao V2 mean: 156.1 cm/sec                                           Ao mean PG: 10.2 mmHg                                           Ao V2 VTI: 48.3 cm                                           HARLEY(I,D): 1.9  cm2                                             HARLEY(V,D): 2.1 cm2  LV V1 max P.1 mmHg                   PA max P.5 mmHg  LV V1 mean PG: 3.4 mmHg  LV V1 max: 133.7 cm/sec  LV V1 mean: 84.6 cm/sec  LV V1 VTI: 29.3 cm    _______________________________________________________________________________      Electronically signed by: MD Vivek Mcdowell  on 2018 07:43 AM         XR Chest 1 View  DATE OF SERVICE:  2017 12:10 PM    PROCEDURE:  XR CHEST PORTABLE 1 VIEW    HISTORY:  Fever    COMPARISON:  2015    TECHNIQUE:  A radiologic portable AP view of the chest was obtained.    DISCUSSION:  The patient is slightly rotated.  There is linear atelectasis within the left  lung base that appears similar to prior exam.  No new focal airspace  consolidation is identified.  Mild cardiomegaly is unchanged.  There is a mild  pulmonary edema pattern.  The osseous structures appear intact.    IMPRESSION:    1. Mild cardiomegaly with mild pulmonary edema pattern.  2. Stable linear atelectasis within left lung base.    Hamlet Bull MD    DS: 2017 12:45  Report ID: [id number]  cc:  JOSHUA STEPHENS    FINAL AUTHENTICATED COPY  CT Head Without Contrast  DATE OF SERVICE:  2017 12:13 PM    PROCEDURE:  CT HEAD WO    HISTORY:  Fever right arm numbness/weakness    COMPARISON:  None.    TECHNIQUE:  Routine transaxial cuts were obtained through the head without the  administration of contrast.    DISCUSSION:  Bilateral lens replacements are in place.  No acute intracranial hemorrhage or  extra-axial collection is identified.  The ventricles appear normal in  caliber, with no evidence of mass effect or midline shift.  The basal cisterns  appear patent.  The gray-white differentiation appears preserved.  A few  scattered foci of periventricular and subcortical white matter hypodensities  are nonspecific, but likely the sequela mild chronic small vessel ischemic  disease.    The calvarium appears intact.   The paranasal sinuses and mastoid air cells are  well aerated.    IMPRESSION:    1. No acute intracranial process identified.  2. Findings suggestive of mild chronic small vessel ischemic disease.    Hamlet Bull MD    DS: 09/26/2017 12:24  Report ID: [id number]  cc:  JOSHUA STEPHENS    FINAL AUTHENTICATED COPY      Assessment/Plan       Cellulitis of right lower extremity          Assessment/Plan:     Acute renal failure with possible ATN/prerenal   -At admission, she was noted to have some hypotension with systolic's in the 100's.  Baseline creatinine around 1.0 in 2018, today 1.5.  No acidosis noted.  Has been on Lasix at home. She received a liter bolus in the ER.  -Hold Lasix  -Gentle hydration, NS at 75mL/hr  -Monitor creatinine, hold nephrotoxic drugs    Excessive sleepiness  -History of hypercapnic respiratory failure secondary to polypharmacy.  -Check ABG    ?Lower extremity cellulitis  -I do not appreciate cellulitis to the lower extremity.  She does have chronic changes to the lower extremities likely secondary to venous stasis vs peripheral vascular disease.  Received Vanc and Zosyn in the ER.  -No clinical indication for additional antibiotics for cellulitis   -Consult wound care    Abnormal urinalysis  -Prior positive culture in July for E.coli with susceptibility to Rocephin.  She has no urinary symptoms at this time.  She presented with a low grade fever of 99.3.    -Follow up urine culture and treat if clinically indicated    Normocytic anemia  -Hgb 9.5 within baseline  -Continue Ferrous Sulfate QOD    Diastolic heart failure with preserved EF  -No acute exacerbation noted at this time  -Hold Lasix due to renal function  -Check BNP    Chronic Asthma, no acute exacerbation  -Continue Zyrtec, Flonase    Chronic pain syndrome with chronic immobility  -Continue Cymbalta  -Hold Baclofen due to renal function and increased sleepiness   -INSPECT verified, no current Rx for Lyrica or Hydrocodone     Parkinson's  Disease  -Continue Carbidopa-levodopa and Requip     Chronic Gout  -Check Uric Acid level  -Hold Allopurinol until renal function returns to baseline     Hyperlipidemia  -Continue Tricor    GERD  -Continue PPI    Hypothyroidism  -Continue Levothyroxine     DVT Prophylaxis  -Lovenox with renally adjusted dose     Will reconcile home medications once nursing has reconciled them.  Discussed with RN who will call [facility] to request MAR.      Disposition    Observation     I discussed the patients findings and my recommendations with patient.  No family at the bedside.      Cata Syed-Jolly, DENISHA  10/07/19  7:33 AM

## 2019-10-07 NOTE — PROGRESS NOTES
Discharge Planning Assessment   [facility]     Patient Name: Lorenza Owens  MRN: [id number]  Today's Date: 10/7/2019    Admit Date: 10/7/2019    Discharge Needs Assessment     Row Name 10/07/19 1216       Living Environment    Lives With  facility resident from assisted living at [facility]    Current Living Arrangements  independent/assisted living facility    Primary Care Provided by  other (see comments) per daughter family private pay a caregiver to come in each day to help patient toget dressed    Provides Primary Care For  no one, unable/limited ability to care for self    Family Caregiver if Needed  child(carina), adult    Quality of Family Relationships  supportive       Resource/Environmental Concerns    Resource/Environmental Concerns  none    Transportation Concerns  other (see comments) family drives patient when needed       Transition Planning    Patient/Family Anticipates Transition to  home with help/services home health with [facility]    Transportation Anticipated  family or friend will provide       Discharge Needs Assessment    Equipment Currently Used at Home  wheelchair, motorized        Discharge Plan     Row Name 10/07/19 1219       Plan    Plan  return to [facility] with [facility]    Patient/Family in Agreement with Plan  yes          Carol naegele rn  Case management  Office number [phone]  Cell phone [phone]

## 2019-10-07 NOTE — PROGRESS NOTES
ABG was ordered; pt refused.  No s/s of resp distress noted at this time.  Pt is 96% on room air.  Pt is complaining of pain in her legs.      Dominga Ma, CRT  10/7/2019  2:42 PM

## 2019-10-07 NOTE — DISCHARGE PLACEMENT REQUEST
"Lorenza Lee (84 y.o. Female)     Date of Birth Social Security Number Address Home Phone MRN    1935  [id number]   [facility] [place] [facility] [phone] [id number]    Episcopalian Marital Status          Anabaptism        Admission Date Admission Type Admitting Provider Attending Provider Department, Room/Bed    10/7/19 Emergency Reyna Yao MD Jaisinghani, Salgram, MD [facility] 3C MEDICAL INPATIENT, 358/1    Discharge Date Discharge Disposition Discharge Destination                       Attending Provider:  Reyna Yao MD    Allergies:  Latex    Isolation:  None   Infection:  None   Code Status:  CPR    Ht:  152.4 cm (60\")   Wt:  92 kg (202 lb 13.2 oz)    Admission Cmt:  None   Principal Problem:  Cellulitis of right lower extremity [L03.115]                 Active Insurance as of 10/7/2019     Primary Coverage     Payor Plan Insurance Group Employer/Plan Group    MEDICARE MEDICARE A & B      Payor Plan Address Payor Plan Phone Number Payor Plan Fax Number Effective Dates    [address] [phone]  6/1/1998 - None Entered    [facility] [id number]       Subscriber Name Subscriber Birth Date Member ID       LORENZA LEE 1935 [id number]           Secondary Coverage     Payor Plan Insurance Group Employer/Plan Group    AARP MED SUPP AARP HEALTH CARE OPTIONS      Payor Plan Address Payor Plan Phone Number Payor Plan Fax Number Effective Dates    [facility] [phone]  1/1/2019 - None Entered    [address]       [facility] [address]       Subscriber Name Subscriber Birth Date Member ID       LORENZA LEE 1935 [id number]                 Emergency Contacts      (Rel.) Home Phone Work Phone Mobile Phone    Vicky Krueger (Daughter) [phone] -- [phone]    GIULIAIDA (Daughter) [phone] -- --            Emergency Contact Information     Name Relation Home Work Mobile    Vicky Krueger Daughter [phone]  " "[phone]    IDA PLATT Daughter [phone]            Insurance Information                MEDICARE/MEDICARE A & B Phone: [phone]    Subscriber: Lorenza Owens Subscriber#: [id number]    Group#:  Precert#:         AARP MED SUPP/AARP HEALTH CARE OPTIONS Phone: [phone]    Subscriber: Lorenza Owens Subscriber#: [id number]    Group#:  Precert#:              History & Physical      Kunal-Cata Azevedo APRN at 10/07/19 0732     Attestation signed by Reyna Yao MD at 10/07/19 1025    Patient seen and examined, chart reviewed, agree with above.   Patient was doing well up to the evening as per daughter when she spoke to her around 6 PM, she said she was called that she is not doing better afterwards, she was noted to have chills, noted a spiking fever.  Patient has the chronic venous stasis involving both lower legs, there is a concern may be she is developing cellulitis in the region.  UA revealed 3-5 WBC, chest x-ray was not done.    Vitals reviewed    Chest bilateral entry  Vascular S1-S2 there is no murmur  Lower extremity revealed possible redness and swelling involving the lower leg.     Impression  Cellulitis lower leg  Possible UTI   Acute kidney injury   Chronic venous stasis.    Plan  Iv cefepime and vancomycin for now.  X.ray chest and viral panel  Hold lasix   Gentle hydration of normal saline for one litre  Avoid nephro toxic medication  Further management per clinical course.  Lovenox for dvt prophylaxis.                         [facility]            Primary Care Provider:  Leobardo Kim APRN    Patient Care Team:  Leobardo Kim APRN as PCP - General    CHIEF COMPLAINT: \"I have a wound behind my knee.  How do I order my breakfast?\"    Chief Complaint   Patient presents with   • Wound Check     HISTORY OF PRESENT ILLNESS:    Ms. Owens is an 84 year old female with a past medical history significant for Gout, chronic " "depression, chronic pain with narcotic dependency, chronic HTN, asthma, Parkinson's disease,chronic constipation, hypothyroidism, GERD, anemia, Fibromyalgia, arthritis, diastolic heart failure with preserved EF, chronic venous stasis, and wheelchair dependent.      She reported to the ER today with complaints of a wound behind her knee.  At first she indicated it was behind her left knee, but I could not appreciate any type of wound.  She then said well no, its on my right leg.  I told her that I noted chronic changes to her lower extremities, but did not appreciate any type of open wound.  Additionally, she can not tell me if she was seen by her PCP or if she has been on any antibiotics up to this point.  According to ER notes, she was placed on a course of Clindamycin for suspected cellulitis.      Work up in the ER showed creatinine 1.5, WBC 9.9, Lactic 1.8. Urinalysis showed hematuria, leukocytosis, and proteinuria.  She was treated for a UTI in July with a culture positive for E.coli.  She denies any dysuria, fevers, chills, nausea, or diarrhea.  She is very sleepy and is having a hard time holding a conversation.  She states she is \"just tired.\"  Past admission showed hypercapnic respiratory failure thought to be due to polypharmacy.  She was requesting additional pain medication in the ER which she did not receive secondary to some noted hypotension.  Systolic's were in the low 100's.     She received a dose of Vancomycin and Zosyn for suspected cellulitis in the ER.  She will be admitted for further work up and evaluation.       Past Medical History:   Diagnosis Date   • Acute kidney injury (MIRI) with acute tubular necrosis (ATN) (CMS/Formerly [facility]) 10/7/2019   • Anemia 4/4/2013   • Anxiety disorder 6/17/2019   • Cellulitis of right lower extremity 10/7/2019   • Depression 1/23/2013   • Edema of lower extremity 5/17/2017   • Fibromyalgia 7/19/2013   • Gastroesophageal reflux disease 6/17/2019   • Hemorrhoids 4/3/2012 "   • Hyperlipidemia 7/19/2013   • Hypothyroidism 7/19/2013   • Low back pain 2/24/2016   • Peripheral venous insufficiency 9/26/2011   • Polyosteoarthritis 12/5/2011   • Sleep apnea 6/21/2013   • Wheelchair dependence 7/19/2013       No past surgical history on file.    No family history on file.    Social History     Tobacco Use   • Smoking status: Never Smoker   • Smokeless tobacco: Never Used   Substance Use Topics   • Alcohol use: No     Frequency: Never   • Drug use: No       Medications Prior to Admission   Medication Sig Dispense Refill Last Dose   • allopurinol (ZYLOPRIM) 100 MG tablet Take 100 mg by mouth Daily.      • baclofen (LIORESAL) 10 MG tablet Take 10 mg by mouth 3 (Three) Times a Day.      • cetirizine (zyrTEC) 10 MG tablet Take 10 mg by mouth Daily.      • fenofibrate (TRICOR) 145 MG tablet Take 145 mg by mouth Daily.      • fluticasone (FLONASE) 50 MCG/ACT nasal spray 2 sprays into the nostril(s) as directed by provider Daily.      • furosemide (LASIX) 40 MG tablet Take 40 mg by mouth 2 (Two) Times a Day.      • lubiprostone (AMITIZA) 24 MCG capsule Take 24 mcg by mouth 2 (Two) Times a Day With Meals.      • magnesium oxide (MAGOX) 400 (241.3 Mg) MG tablet tablet Take 400 mg by mouth Daily.      • melatonin 5 MG tablet tablet Take 5 mg by mouth.      • polyethylene glycol (MIRALAX) packet Take 17 g by mouth Daily.      • potassium chloride (K-DUR,KLOR-CON) 20 MEQ CR tablet Take 20 mEq by mouth 2 (Two) Times a Day.      • pregabalin (LYRICA) 75 MG capsule LYRICA 75 MG CAPS      • raNITIdine (ZANTAC) 75 MG tablet Take 75 mg by mouth 2 (Two) Times a Day.      • rOPINIRole (REQUIP) 4 MG tablet Take 3 mg by mouth Every Night.      • senna (SENOKOT) 8.6 MG tablet tablet Take  by mouth Every Night.      • simethicone (MYLICON) 80 MG chewable tablet Chew 80 mg Every 6 (Six) Hours As Needed for Flatulence.      • vitamin B-12 (CYANOCOBALAMIN) 1000 MCG tablet Take 1,000 mcg by mouth Daily.      • vitamin C  "(ASCORBIC ACID) 500 MG tablet Take 500 mg by mouth Daily.      • vitamin D (ERGOCALCIFEROL) 06876 units capsule capsule Take 50,000 Units by mouth 1 (One) Time Per Week.      • carbidopa-levodopa (SINEMET)  MG per tablet SINEMET  MG TABS      • DULoxetine (CYMBALTA) 60 MG capsule CYMBALTA 60 MG CPEP      • esomeprazole (NexIUM) 40 MG packet NEXIUM PACK      • ferrous gluconate (FERGON) 324 MG tablet FERROUS GLUCONATE 324 (38 Fe) MG TABS      • HYDROcodone-acetaminophen (NORCO)  MG per tablet NORCO  MG TABS      • levothyroxine (SYNTHROID) 100 MCG tablet SYNTHROID 100 MCG TABS      • Levothyroxine Sodium (SYNTHROID PO) SYNTHROID TABS      • mupirocin (BACTROBAN) 2 % cream Apply  topically to the appropriate area as directed 3 (Three) Times a Day. 30 g 0    • pregabalin (LYRICA) 100 MG capsule LYRICA CAPS          Allergies:  Latex    Immunization History   Administered Date(s) Administered   • TD Preservative Free 06/17/2019           REVIEW OF SYSTEMS:     Review of Systems   Constitution: Positive for malaise/fatigue.   Skin: Positive for color change and poor wound healing.   All other systems reviewed and are negative.      Vital Signs  Temp:  [98.1 °F (36.7 °C)-99.3 °F (37.4 °C)] 98.1 °F (36.7 °C)  Heart Rate:  [85-98] 85  Resp:  [16-18] 16  BP: (100-120)/(50-61) 100/50    Flowsheet Rows      First Filed Value   Admission Height  152.4 cm (60\") Documented at 10/07/2019 0228   Admission Weight  90.7 kg (200 lb) Documented at 10/07/2019 0228           Physical Exam:    Physical Exam   Constitutional: She appears well-developed.   HENT:   Head: Normocephalic and atraumatic.   Eyes: EOM are normal. Pupils are equal, round, and reactive to light.   Neck: Normal range of motion.   Cardiovascular: Normal rate and regular rhythm.   Pulmonary/Chest: Effort normal and breath sounds normal.   Abdominal: Soft. Bowel sounds are normal.   Musculoskeletal: Normal range of motion. She exhibits edema. "   Neurological:   Sleepy, but arousable   Skin: Skin is warm.   Face appears sweaty. Lower extremity darkening of the skin with some skin tears to the right shin.  Left leg cut she states she got from her wheelchair.          Results Review:      I reviewed the patient's new clinical results.    Lab Results (most recent)     Procedure Component Value Units Date/Time    Urinalysis With Culture If Indicated - Urine, Catheter [[id number]]  (Abnormal) Collected:  10/07/19 0534    Specimen:  Urine, Catheter Updated:  10/07/19 0557     Color, UA Dark Yellow     Appearance, UA Clear     pH, UA 5.5     Specific Gravity, UA 1.022     Glucose, UA Negative     Ketones, UA Negative     Bilirubin, UA Negative     Blood, UA Small (1+)     Protein, UA Trace     Leuk Esterase, UA Trace     Nitrite, UA Negative     Urobilinogen, UA 1.0 E.U./dL    Urinalysis, Microscopic Only - Urine, Catheter [[id number]]  (Abnormal) Collected:  10/07/19 0534    Specimen:  Urine, Catheter Updated:  10/07/19 0557     RBC, UA 0-2 /HPF      WBC, UA 3-5 /HPF      Bacteria, UA Trace /HPF      Squamous Epithelial Cells, UA 7-12 /HPF      Hyaline Casts, UA 3-6 /LPF      Methodology Automated Microscopy    [place] Draw [[id number]] Collected:  10/07/19 0418    Specimen:  Blood Updated:  10/07/19 0530    Narrative:       The following orders were created for panel order [place] Draw.  Procedure                               Abnormality         Status                     ---------                               -----------         ------                     Light Blue Top[[id number]]                                   Final result               Green Top (Gel)[[id number]]                                  Final result               Lavender Top[[id number]]                                     Final result               Gold Top - SST[[id number]]                                   Final result                 Please view results for these tests on the individual orders.     Light Blue Top [[id number]] Collected:  10/07/19 0418    Specimen:  Blood Updated:  10/07/19 0530     Extra Tube hold for add-on     Comment: Auto resulted       Green Top (Gel) [[id number]] Collected:  10/07/19 0418    Specimen:  Blood Updated:  10/07/19 0530     Extra Tube Hold for add-ons.     Comment: Auto resulted.       Lavender Top [[id number]] Collected:  10/07/19 0418    Specimen:  Blood Updated:  10/07/19 0530     Extra Tube hold for add-on     Comment: Auto resulted       Gold Top - SST [[id number]] Collected:  10/07/19 0418    Specimen:  Blood Updated:  10/07/19 0530     Extra Tube Hold for add-ons.     Comment: Auto resulted.       Blood Culture - Blood, Wrist, Left [[id number]] Collected:  10/07/19 0418    Specimen:  Blood from Wrist, Left Updated:  10/07/19 0502    Comprehensive Metabolic Panel [[id number]]  (Abnormal) Collected:  10/07/19 0418    Specimen:  Blood Updated:  10/07/19 0457     Glucose 134 mg/dL      BUN 29 mg/dL      Creatinine 1.50 mg/dL      Sodium 142 mmol/L      Potassium 4.9 mmol/L      Comment: Specimen hemolyzed.  Results may be affected.        Chloride 106 mmol/L      CO2 26.0 mmol/L      Calcium 8.7 mg/dL      Total Protein 6.7 g/dL      Albumin 3.40 g/dL      ALT (SGPT) 6 U/L      Comment: Specimen hemolyzed.  Results may be affected.        AST (SGOT) 33 U/L      Comment: Specimen hemolyzed.  Results may be affected.        Alkaline Phosphatase 58 U/L      Total Bilirubin 0.7 mg/dL      Comment: Specimen hemolyzed.  Results may be affected.        eGFR Non African Amer 33 mL/min/1.73      Globulin 3.3 gm/dL      A/G Ratio 1.0 g/dL      BUN/Creatinine Ratio 19.3     Anion Gap 14.9 mmol/L     Narrative:       The MDRD GFR formula is only valid for adults with stable renal function between ages 18 and 70.    CBC & Differential [[id number]] Collected:  10/07/19 0418    Specimen:  Blood Updated:  10/07/19 0429    Narrative:       The following orders were created for panel order CBC  & Differential.  Procedure                               Abnormality         Status                     ---------                               -----------         ------                     CBC Auto Differential[[id number]]        Abnormal            Final result                 Please view results for these tests on the individual orders.    CBC Auto Differential [[id number]]  (Abnormal) Collected:  10/07/19 0418    Specimen:  Blood Updated:  10/07/19 0429     WBC 9.90 10*3/mm3      RBC 2.90 10*6/mm3      Hemoglobin 9.5 g/dL      Hematocrit 27.5 %      MCV 94.7 fL      MCH 32.6 pg      MCHC 34.4 g/dL      RDW 16.2 %      RDW-SD 54.7 fl      MPV 7.9 fL      Platelets 177 10*3/mm3      Neutrophil % 90.2 %      Lymphocyte % 4.1 %      Monocyte % 5.4 %      Eosinophil % 0.1 %      Basophil % 0.2 %      Neutrophils, Absolute 8.90 10*3/mm3      Lymphocytes, Absolute 0.40 10*3/mm3      Monocytes, Absolute 0.50 10*3/mm3      Eosinophils, Absolute 0.00 10*3/mm3      Basophils, Absolute 0.00 10*3/mm3      nRBC 0.0 /100 WBC     POC Lactate [[id number]]  (Normal) Collected:  10/07/19 0426    Specimen:  Blood Updated:  10/07/19 0427     Lactate 1.8 mmol/L      Comment: Serial Number: [id number]:  [id number]       Blood Culture - Blood, Arm, Left [[id number]] Collected:  10/07/19 0409    Specimen:  Blood from Arm, Left Updated:  10/07/19 0413          Imaging Results (most recent)     None        reviewed    ECG/EMG Results (most recent)     None        reviewed    Results for orders placed during the hospital encounter of 08/11/18   Duplex Venous Lower Extremity - Bilateral CAR    Narrative                   Lower Extremity Venous Report                          [facility]                         Vascular Laboratory                            [id number]    Name: LEVI LEE                  Study Date: 08/15/2018 10:28 AM  MRN: [id number]                       Patient  Location: 3A  : 1935 (M/d/yyyy)           Gender: Female  Age: 83 yrs                          Account#: [id number]  Reason For Study: elevated d-dimer      Procedure  Venous study was carried out in bilateral lower extremities. Gray scale, color  flow, and spectral doppler images were obtained. Images were obtained in  transverse and longitudinal views. Technically satisfactory study.    Right Lower Extremity Venous  On the right side the patient has patent common femoral, femoral, and  popliteal veins. The main veins are easily compressible. Femoral vein was  mapped in its entirety through the course of the thigh. It is patent and  compresses well. The popliteal vein is patent along with its tributaries and  compresses well with no evidence of any filling defect. Augmentation test is  positive. Respiratory waves are biphasic. Right posterior tibial vein is  patent. Right peroneal vein not visualized. The right greater and small  saphenous veins are patent with good compressibility.    Left Lower Extremity Venous  On the left side the patient has patent common femoral, femoral, and popliteal  veins. The main veins compress well. Femoral vein was mapped in its entirety  through the course of the thigh. It is patent and compresses well. The  popliteal vein is patent along with its tributaries and compresses well with  no evidence of any filling defect. Augmentation test is positive. Respiratory  waves are biphasic. Left posterior tibial vein is patent. Left peroneal vein  not visualized. The left greater and small saphenous veins are patent with  good compressibility.      Interpretation Summary  No evidence of any deep vein thrombosis or superficial vein thrombosis.  _______________________________________________________________________________    Electronically signed by: MD Violet Robles  on 08/15/2018 04:43 PM    Ordering Physician: FRANCISCO IBARRA  Referring Physician: LAKESHA BESS  Performed By: OSKAR          Results for orders placed during the hospital encounter of 18   Adult Transthoracic Echo Complete W/ Cont if Necessary Per Protocol    Narrative                           Adult Echocardiogram Report          [facility]  Cardiology Department  [address] [place] [address]  [id number]      Name: LEVI LEE       Study Date: 08/15/2018 09:47 AM      BP: 168/79 mmHg  MRN: [id number]            Patient Location:   : 1935           Gender: Female                       Height: 60 in  Age: 83 yrs               Account#: [id number]                Weight: 197 lb  Reason For Study: SOA/WEAKNESS                                 BSA: 1.9 m2  Ordering Physician: FRANCISCO IBARRA  Referring Physician:  LAKESHA BESS  Performed By: AM      M-Mode/2-D Measurements:  LVIDd: 4.7 cm       (3.7-5.7) LVPWd: 1.1 cm        (0.8-1.2)  LVIDs: 3.1 cm       (2.3-3.9)  ACS: 1.8 cm         (1.6-3.7) IVSd: 0.93 cm        (0.7-1.2)  LA dimension: 3.7 cm(1.9-4.0) RVDd: 2.6 cm         (0.7-2.4)  FS: 34.6 %          (21-40%)  Ao root diam: 3.0 cm (2.0-3.7)    Procedure  A 2D, M-mode, color flow and doppler echocardiogram was performed.    Comments  The left ventricle is normal in size. There is normal left ventricular wall  thickness. Left ventricular systolic function is normal. Ejection Fraction is  55-60%. The transmitral spectral Doppler flow pattern is suggestive of  impaired LV relaxation. No regional wall motion abnormalities noted. The right  ventricle is grossly normal size. Right ventricular function cannot be  assessed due to poor image quality. The left atrial size is normal. The right  atrium is mild to moderately dilated. The mitral valve is grossly normal in  structure. There is trace mitral regurgitation. The tricuspid valve is not  well visualized. Mild aortic valve calcification. Mild valvular aortic  stenosis. Peak/mean ACV gradients are 16/10 mm Hg respectively. Calculated HARLEY  is 1.9 sq cm. No aortic  regurgitation is present. The pulmonic valve is not  well visualized. The aortic root is normal size. The pulmonary is not well  visualized. There is no pericardial effusion.      Interpretation  Left ventricular systolic function is normal.  Ejection Fraction is 55-60%  The transmitral spectral Doppler flow pattern is suggestive of impaired LV  relaxation.  The right atrium is mild to moderately dilated.  There is trace mitral regurgitation.  Mild aortic valve calcification.  Mild valvular aortic stenosis.  Peak/mean ACV gradients are 16/10 mm Hg respectively. Calculated HARLEY is 1.9 sq  cm.  The study was technically difficult.      MMode/2D Measurements & Calculations  ESV(Teich): 37.5 ml                  Ao root area: 7.2 cm2  EF(Teich): 63.7 %  LVOT diam: 2.0 cm                    EDV(MOD-sp4): 60.5 ml                                       ESV(MOD-sp4): 27.0 ml                                       EF(MOD-sp4): 55.3 %    Doppler Measurements & Calculations  MV E max gretel: 56.1 cm/sec                MV max P.3 mmHg  MV A max gretel: 151.5 cm/sec               MV mean P.6 mmHg  MV E/A: 0.37  MV dec slope: 567.6 cm/sec2              Ao V2 max: 200.6 cm/sec  MV dec time: 0.20 sec                    Ao max P.1 mmHg                                           Ao V2 mean: 156.1 cm/sec                                           Ao mean PG: 10.2 mmHg                                           Ao V2 VTI: 48.3 cm                                           HARLEY(I,D): 1.9 cm2                                             HARLEY(V,D): 2.1 cm2  LV V1 max P.1 mmHg                   PA max P.5 mmHg  LV V1 mean PG: 3.4 mmHg  LV V1 max: 133.7 cm/sec  LV V1 mean: 84.6 cm/sec  LV V1 VTI: 29.3 cm    _______________________________________________________________________________      Electronically signed by: MD Vivek Mcdowell  on 2018 07:43 AM         XR Chest 1 View  DATE OF SERVICE:  2017 12:10  PM    PROCEDURE:  XR CHEST PORTABLE 1 VIEW    HISTORY:  Fever    COMPARISON:  December 26, 2015    TECHNIQUE:  A radiologic portable AP view of the chest was obtained.    DISCUSSION:  The patient is slightly rotated.  There is linear atelectasis within the left  lung base that appears similar to prior exam.  No new focal airspace  consolidation is identified.  Mild cardiomegaly is unchanged.  There is a mild  pulmonary edema pattern.  The osseous structures appear intact.    IMPRESSION:    1. Mild cardiomegaly with mild pulmonary edema pattern.  2. Stable linear atelectasis within left lung base.    Hamlet Bull MD    DS: 09/26/2017 12:45  Report ID: [id number]  cc:  JOSHUA STEPHENS    FINAL AUTHENTICATED COPY  CT Head Without Contrast  DATE OF SERVICE:  9/26/2017 12:13 PM    PROCEDURE:  CT HEAD WO    HISTORY:  Fever right arm numbness/weakness    COMPARISON:  None.    TECHNIQUE:  Routine transaxial cuts were obtained through the head without the  administration of contrast.    DISCUSSION:  Bilateral lens replacements are in place.  No acute intracranial hemorrhage or  extra-axial collection is identified.  The ventricles appear normal in  caliber, with no evidence of mass effect or midline shift.  The basal cisterns  appear patent.  The gray-white differentiation appears preserved.  A few  scattered foci of periventricular and subcortical white matter hypodensities  are nonspecific, but likely the sequela mild chronic small vessel ischemic  disease.    The calvarium appears intact.  The paranasal sinuses and mastoid air cells are  well aerated.    IMPRESSION:    1. No acute intracranial process identified.  2. Findings suggestive of mild chronic small vessel ischemic disease.    Hamlet Bull MD    DS: 09/26/2017 12:24  Report ID: [id number]  cc:  JOSHUA STEPHENS    FINAL AUTHENTICATED COPY      Assessment/Plan       Cellulitis of right lower extremity          Assessment/Plan:     Acute renal failure with possible  ATN/prerenal   -At admission, she was noted to have some hypotension with systolic's in the 100's.  Baseline creatinine around 1.0 in 2018, today 1.5.  No acidosis noted.  Has been on Lasix at home. She received a liter bolus in the ER.  -Hold Lasix  -Gentle hydration, NS at 75mL/hr  -Monitor creatinine, hold nephrotoxic drugs    Excessive sleepiness  -History of hypercapnic respiratory failure secondary to polypharmacy.  -Check ABG    ?Lower extremity cellulitis  -I do not appreciate cellulitis to the lower extremity.  She does have chronic changes to the lower extremities likely secondary to venous stasis vs peripheral vascular disease.  Received Vanc and Zosyn in the ER.  -No clinical indication for additional antibiotics for cellulitis   -Consult wound care    Abnormal urinalysis  -Prior positive culture in July for E.coli with susceptibility to Rocephin.  She has no urinary symptoms at this time.  She presented with a low grade fever of 99.3.    -Follow up urine culture and treat if clinically indicated    Normocytic anemia  -Hgb 9.5 within baseline  -Continue Ferrous Sulfate QOD    Diastolic heart failure with preserved EF  -No acute exacerbation noted at this time  -Hold Lasix due to renal function  -Check BNP    Chronic Asthma, no acute exacerbation  -Continue Zyrtec, Flonase    Chronic pain syndrome with chronic immobility  -Continue Cymbalta  -Hold Baclofen due to renal function and increased sleepiness   -INSPECT verified, no current Rx for Lyrica or Hydrocodone     Parkinson's Disease  -Continue Carbidopa-levodopa and Requip     Chronic Gout  -Check Uric Acid level  -Hold Allopurinol until renal function returns to baseline     Hyperlipidemia  -Continue Tricor    GERD  -Continue PPI    Hypothyroidism  -Continue Levothyroxine     DVT Prophylaxis  -Lovenox with renally adjusted dose     Will reconcile home medications once nursing has reconciled them.  Discussed with RN who will call [facility] to  "request MAR.      Disposition    Observation     I discussed the patients findings and my recommendations with patient.  No family at the bedside.      Cata Kunal-Short, APRN  10/07/19  7:33 AM            Electronically signed by Reyna Yao MD at 10/07/19 1025       [facility] Medications (active)       Dose Frequency Start End    acetaminophen (TYLENOL) 160 MG/5ML solution 650 mg 650 mg Every 4 Hours PRN 10/7/2019     Sig - Route: Take 20.3 mL by mouth Every 4 (Four) Hours As Needed for Mild Pain . - Oral    Linked Group 1:  \"Or\" Linked Group Details        acetaminophen (TYLENOL) suppository 650 mg 650 mg Every 4 Hours PRN 10/7/2019     Sig - Route: Insert 1 suppository into the rectum Every 4 (Four) Hours As Needed for Mild Pain . - Rectal    Linked Group 1:  \"Or\" Linked Group Details        acetaminophen (TYLENOL) tablet 650 mg 650 mg Every 4 Hours PRN 10/7/2019     Sig - Route: Take 2 tablets by mouth Every 4 (Four) Hours As Needed for Mild Pain . - Oral    Linked Group 1:  \"Or\" Linked Group Details        allopurinol (ZYLOPRIM) tablet 100 mg 100 mg Daily 10/7/2019     Sig - Route: Take 1 tablet by mouth Daily. - Oral    bacitracin ointment  Every 24 Hours Scheduled 10/7/2019     Sig - Route: Apply  topically to the appropriate area as directed Daily. - Topical    baclofen (LIORESAL) tablet 5 mg 5 mg 3 Times Daily 10/7/2019     Sig - Route: Take 0.5 tablets by mouth 3 (Three) Times a Day. - Oral    carbidopa-levodopa (SINEMET)  MG per tablet 1 tablet 1 tablet Every 8 Hours Scheduled 10/7/2019     Sig - Route: Take 1 tablet by mouth Every 8 (Eight) Hours. - Oral    cefepime 1 gm IVPB in 100 mL NS (MBP) 1 g Every 24 Hours 10/7/2019 10/12/2019    Sig - Route: Infuse 1 g into a venous catheter Daily. - Intravenous    enoxaparin (LOVENOX) syringe 30 mg 30 mg Every 24 Hours 10/7/2019     Sig - Route: Inject 0.3 mL under the skin into the appropriate area as directed Daily. - Subcutaneous    " "fluticasone (FLONASE) 50 MCG/ACT nasal spray 2 spray 2 spray Daily 10/7/2019     Sig - Route: 2 sprays into the nostril(s) as directed by provider Daily. - Nasal    HYDROcodone-acetaminophen (NORCO) 7.5-325 MG per tablet 1 tablet 1 tablet Every 6 Hours PRN 10/7/2019 10/17/2019    Sig - Route: Take 1 tablet by mouth Every 6 (Six) Hours As Needed for Moderate Pain . - Oral    influenza vac split quad (FLUZONE,FLUARIX,AFLURIA) injection 0.5 mL 0.5 mL During Hospitalization 10/7/2019 10/7/2019    Sig - Route: Inject 0.5 mL into the appropriate muscle as directed by prescriber During Hospitalization for Immunization. - Intramuscular    levothyroxine (SYNTHROID, LEVOTHROID) tablet 137 mcg 137 mcg Every Early Morning 10/7/2019     Sig - Route: Take 137 mcg by mouth Every Morning. - Oral    magnesium oxide (MAGOX) tablet 400 mg 400 mg Daily 10/7/2019     Sig - Route: Take 1 tablet by mouth Daily. - Oral    ondansetron (ZOFRAN) injection 4 mg 4 mg Every 6 Hours PRN 10/7/2019     Sig - Route: Infuse 2 mL into a venous catheter Every 6 (Six) Hours As Needed for Nausea or Vomiting. - Intravenous    Linked Group 2:  \"Or\" Linked Group Details        ondansetron (ZOFRAN) tablet 4 mg 4 mg Every 6 Hours PRN 10/7/2019     Sig - Route: Take 1 tablet by mouth Every 6 (Six) Hours As Needed for Nausea or Vomiting. - Oral    Linked Group 2:  \"Or\" Linked Group Details        pantoprazole (PROTONIX) EC tablet 40 mg 40 mg Every Early Morning 10/7/2019     Sig - Route: Take 1 tablet by mouth Every Morning. - Oral    Pharmacy to dose vancomycin  Continuous PRN 10/7/2019 10/12/2019    Sig - Route: Continuous As Needed for Consult. - Does not apply    piperacillin-tazobactam (ZOSYN) IVPB 3.375 g in 100 mL NS (CD) 3.375 g Once 10/7/2019 10/7/2019    Sig - Route: Infuse 3.375 g into a venous catheter 1 (One) Time. - Intravenous    pregabalin (LYRICA) capsule 50 mg 50 mg Every 12 Hours Scheduled 10/7/2019     Sig - Route: Take 2 capsules by mouth " Every 12 (Twelve) Hours. - Oral    rOPINIRole (REQUIP) tablet 3 mg 3 mg Nightly 10/7/2019     Sig - Route: Take 3 tablets by mouth Every Night. - Oral    simethicone (MYLICON) chewable tablet 80 mg 80 mg Every 6 Hours PRN 10/7/2019     Sig - Route: Chew 1 tablet Every 6 (Six) Hours As Needed for Flatulence. - Oral    sodium chloride 0.9 % bolus 1,000 mL 1,000 mL Once 10/7/2019 10/7/2019    Sig - Route: Infuse 1,000 mL into a venous catheter 1 (One) Time. - Intravenous    sodium chloride 0.9 % flush 10 mL 10 mL As Needed 10/7/2019     Sig - Route: Infuse 10 mL into a venous catheter As Needed for Line Care. - Intravenous    sodium chloride 0.9 % flush 10 mL 10 mL Every 12 Hours Scheduled 10/7/2019     Sig - Route: Infuse 10 mL into a venous catheter Every 12 (Twelve) Hours. - Intravenous    sodium chloride 0.9 % flush 10 mL 10 mL As Needed 10/7/2019     Sig - Route: Infuse 10 mL into a venous catheter As Needed for Line Care. - Intravenous    sodium chloride 0.9 % infusion 75 mL/hr Continuous 10/7/2019     Sig - Route: Infuse 75 mL/hr into a venous catheter Continuous. - Intravenous    sodium chloride 0.9 % infusion 75 mL/hr Once 10/7/2019 10/7/2019    Sig - Route: Infuse 75 mL/hr into a venous catheter 1 (One) Time. - Intravenous    vancomycin 1000 mg/250 mL 0.9% NS IVPB ([facility]) 1,000 mg As Needed 10/7/2019 10/12/2019    Sig - Route: Infuse 250 mL into a venous catheter As Needed (do not administer doses off of this order - pulse dosing per pharmacy). - Intravenous    vancomycin 1250 mg/250 mL 0.9% NS IVPB ([facility]) 1,250 mg Once 10/7/2019 10/7/2019    Sig - Route: Infuse 250 mL into a venous catheter 1 (One) Time. - Intravenous    vitamin B-12 (CYANOCOBALAMIN) tablet 1,000 mcg 1,000 mcg Daily 10/7/2019     Sig - Route: Take 1 tablet by mouth Daily. - Oral    famotidine (PEPCID) tablet 20 mg (Discontinued) 20 mg Daily 10/7/2019 10/7/2019    Sig - Route: Take 1 tablet by mouth Daily. - Oral    furosemide (LASIX) tablet 40  mg (Discontinued) 40 mg 2 Times Daily (Diuretics) 10/7/2019 10/7/2019    Sig - Route: Take 1 tablet by mouth 2 (Two) Times a Day. - Oral    HYDROcodone-acetaminophen (NORCO)  MG per tablet 1 tablet (Discontinued) 1 tablet Every 6 Hours PRN 10/7/2019 10/7/2019    Sig - Route: Take 1 tablet by mouth Every 6 (Six) Hours As Needed for Moderate Pain . - Oral    levothyroxine (SYNTHROID, LEVOTHROID) tablet 50 mcg (Discontinued) 50 mcg Every Early Morning 10/7/2019 10/7/2019    Sig - Route: Take 1 tablet by mouth Every Morning. - Oral    pregabalin (LYRICA) capsule 25 mg (Discontinued) 25 mg Daily 10/7/2019 10/7/2019    Sig - Route: Take 1 capsule by mouth Daily. - Oral    sodium chloride 0.9 % infusion (Discontinued) 100 mL/hr Continuous 10/7/2019 10/7/2019    Sig - Route: Infuse 100 mL/hr into a venous catheter Continuous. - Intravenous

## 2019-10-07 NOTE — PROGRESS NOTES
"Pharmacy Dosing Service  Antibiotic  Vancomycin    84 y.o.female admitted with RLE cellulitis + ?UTI. Pharmacy was consulted to dose vancomycin.      Assessment/Plan  1. Vancomycin day #1: pulse dosing for reduced renal Fx (SCr 1.5 mg/dL). Pt received 1500 mg (~20 mg/kg DBW) IV x1 this morning. Will obtain random level tomorrow with am labs. Plan to redose when level is expected to be <20 mcg/mL.    2. Cefepime day #1: 1g IV q24h, appropriate for borderline CrCl ~29 mL/min.    Recommended empiric ceftriaxone instead of cefepime due to no risk factors for Pseudomonal skin soft tissue infection + ceftriaxone being good for empiric UTI. Dr Yao wants to proceed with cefepime at this time. Pharmacy will continue to follow pt renal function, C&S, drug levels, and clinical status.       Relevant clinical data and objective history reviewed:  152.4 cm (60\")   92 kg (202 lb 13.2 oz)   Ideal body weight: 45.5 kg (100 lb 4.9 oz)  Adjusted ideal body weight: 64.1 kg (141 lb 5 oz)    Creatinine   Date Value Ref Range Status   10/07/2019 1.50 (H) 0.40 - 1.00 mg/dL Final   08/17/2018 1.0 0.4 - 1.0 mg/dl Final   08/15/2018 1.0 0.4 - 1.0 mg/dl Final   08/14/2018 1.0 0.4 - 1.0 mg/dl Final     Estimated Creatinine Clearance: 29.3 mL/min (A) (by C-G formula based on SCr of 1.5 mg/dL (H)).  No intake/output data recorded.    Lab Results   Component Value Date    WBC 9.90 10/07/2019     Temp Readings from Last 3 Encounters:   10/07/19 98.1 °F (36.7 °C) (Oral)   07/25/19 97.4 °F (36.3 °C) (Temporal)   06/17/19 97.2 °F (36.2 °C) (Temporal)     Baseline culture/source/susceptibility:  Microbiology Results (last 10 days)       ** No results found for the last 240 hours. **             Anti-Infectives (From admission, onward)      Ordered     Dose/Rate Route Frequency Start Stop    10/07/19 1018  cefepime 1 gm IVPB in 100 mL NS (MBP)     Ordering Provider:  Reyna Yao MD    1 g Intravenous Every 24 Hours 10/07/19 1130 " 10/12/19 1129    10/07/19 1036  vancomycin 1000 mg/250 mL 0.9% NS IVPB ([facility])     Ordering Provider:  Reyna Yao MD    1,000 mg Intravenous As Needed 10/07/19 1035 10/12/19 1034    10/07/19 1018  Pharmacy to dose vancomycin     Ordering Provider:  Reyna Yao MD     Does not apply Continuous PRN 10/07/19 1018 10/12/19 1017    10/07/19 0350  piperacillin-tazobactam (ZOSYN) IVPB 3.375 g in 100 mL NS (CD)     Ordering Provider:  Luis Whaley MD    3.375 g  over 30 Minutes Intravenous Once 10/07/19 0352 10/07/19 0501    10/07/19 0350  vancomycin 1250 mg/250 mL 0.9% NS IVPB ([facility])     Ordering Provider:  Luis Whaley MD    1,250 mg Intravenous Once 10/07/19 0352 10/07/19 0700           Britt Aguilar, PharmD, BCPS

## 2019-10-07 NOTE — ED PROVIDER NOTES
Subjective   Very pleasant 84-year-old female who has a right leg wound after bumping into furniture several weeks ago.  Patient reports increased pain and redness and drainage for the past several days with fever tonight of 103.  Patient had clindamycin 300 mg p.o. every 6 hours started yesterday.  Patient denies any other symptoms.  No cough or shortness of air, abdominal pain vomiting or diarrhea or dysuria.  Symptoms are moderate.  No clear aggravating alleviating factors.            Review of Systems   Constitutional: Positive for fatigue and fever.   Skin: Positive for rash and wound.   All other systems reviewed and are negative.      No past medical history on file.    Allergies   Allergen Reactions   • Latex Rash       No past surgical history on file.    No family history on file.    Social History     Socioeconomic History   • Marital status:      Spouse name: Not on file   • Number of children: Not on file   • Years of education: Not on file   • Highest education level: Not on file   Tobacco Use   • Smoking status: Never Smoker   • Smokeless tobacco: Never Used   Substance and Sexual Activity   • Alcohol use: No     Frequency: Never   • Drug use: No   • Sexual activity: Defer           Objective   Physical Exam   Constitutional: She is oriented to person, place, and time. She appears well-developed and well-nourished.   HENT:   Head: Normocephalic and atraumatic.   Dry mouth, no pharyngeal erythema   Eyes: Conjunctivae are normal. Pupils are equal, round, and reactive to light.   Neck: Normal range of motion. Neck supple.   Cardiovascular: Normal rate, regular rhythm, normal heart sounds and intact distal pulses.   Pulmonary/Chest: Effort normal and breath sounds normal.   Abdominal: Soft. Bowel sounds are normal. She exhibits no distension. There is no tenderness.   Musculoskeletal:   Right lower extremity with anterior lateral wound, superficial with drainage and erythema and warmth, no abscess  appreciated   Neurological: She is alert and oriented to person, place, and time.   Chronic decreased strength bilateral lower extremities, otherwise at baseline and intact   Skin: Skin is warm and dry. Capillary refill takes less than 2 seconds.   Psychiatric: She has a normal mood and affect. Her behavior is normal.       Procedures           ED Course                  MDM  Number of Diagnoses or Management Options  Acute kidney injury (MIRI) with acute tubular necrosis (ATN) (CMS/HCC):   Cellulitis of right lower extremity:   Diagnosis management comments: Results for orders placed or performed during the hospital encounter of 10/07/19  -Comprehensive Metabolic Panel       Result                      Value             Ref Range           Glucose                     134 (H)           65 - 99 mg/dL       BUN                         29 (H)            8 - 20 mg/dL        Creatinine                  1.50 (H)          0.40 - 1.00 *       Sodium                      142               136 - 144 mm*       Potassium                   4.9               3.6 - 5.1 mm*       Chloride                    106               101 - 111 mm*       CO2                         26.0              22.0 - 32.0 *       Calcium                     8.7 (L)           8.9 - 10.3 m*       Total Protein               6.7               6.1 - 7.9 g/*       Albumin                     3.40 (L)          3.50 - 4.80 *       ALT (SGPT)                  6 (L)             14 - 54 U/L         AST (SGOT)                  33                15 - 41 U/L         Alkaline Phosphatase        58                32 - 91 U/L         Total Bilirubin             0.7               0.3 - 1.2 mg*       eGFR Non  Amer       33 (L)            >60 mL/min/1*       Globulin                    3.3               2.5 - 3.8 gm*       A/G Ratio                   1.0               1.0 - 1.7 g/*       BUN/Creatinine Ratio        19.3              5.4 - 26.2          Anion Gap                    14.9              5.0 - 15.0 m*  -Urinalysis With Culture If Indicated - Urine, Catheter       Result                      Value             Ref Range           Color, UA                                     Yellow, Straw   Dark Yellow (A)       Appearance, UA              Clear             Clear               pH, UA                      5.5               5.0 - 8.0           Specific Gravity, UA        1.022             1.005 - 1.030       Glucose, UA                 Negative          Negative            Ketones, UA                 Negative          Negative            Bilirubin, UA               Negative          Negative            Blood, UA                                     Negative        Small (1+) (A)       Protein, UA                 Trace (A)         Negative            Leuk Esterase, UA           Trace (A)         Negative            Nitrite, UA                 Negative          Negative            Urobilinogen, UA            1.0 E.U./dL       0.2 - 1.0 E.*  -CBC Auto Differential       Result                      Value             Ref Range           WBC                         9.90              3.40 - 10.80*       RBC                         2.90 (L)          3.77 - 5.28 *       Hemoglobin                  9.5 (L)           12.0 - 15.9 *       Hematocrit                  27.5 (L)          34.0 - 46.6 %       MCV                         94.7              79.0 - 97.0 *       MCH                         32.6              26.6 - 33.0 *       MCHC                        34.4              31.5 - 35.7 *       RDW                         16.2 (H)          12.3 - 15.4 %       RDW-SD                      54.7 (H)          37.0 - 54.0 *       MPV                         7.9               6.0 - 12.0 fL       Platelets                   177               140 - 450 10*       Neutrophil %                90.2 (H)          42.7 - 76.0 %       Lymphocyte %                4.1 (L)           19.6 - 45.3 %        Monocyte %                  5.4               5.0 - 12.0 %        Eosinophil %                0.1 (L)           0.3 - 6.2 %         Basophil %                  0.2               0.0 - 1.5 %         Neutrophils, Absolute       8.90 (H)          1.70 - 7.00 *       Lymphocytes, Absolute       0.40 (L)          0.70 - 3.10 *       Monocytes, Absolute         0.50              0.10 - 0.90 *       Eosinophils, Absolute       0.00              0.00 - 0.40 *       Basophils, Absolute         0.00              0.00 - 0.20 *       nRBC                        0.0               0.0 - 0.2 /1*  -Urinalysis, Microscopic Only - Urine, Catheter       Result                      Value             Ref Range           RBC, UA                     0-2 (A)           None Seen /H*       WBC, UA                     3-5 (A)           None Seen /H*       Bacteria, UA                Trace (A)         None Seen /H*       Squamous Epithelial Ce*     7-12 (A)          None Seen, 0*       Hyaline Casts, UA           3-6               None Seen /L*       Methodology                                                   Automated Microscopy  -POC Lactate       Result                      Value             Ref Range           Lactate                     1.8               0.5 - 2.0 mm*  -Light Blue Top       Result                      Value             Ref Range           Extra Tube                                                    hold for add-on  -Green Top (Gel)       Result                      Value             Ref Range           Extra Tube                                                    Hold for add-ons.  -Lavender Top       Result                      Value             Ref Range           Extra Tube                                                    hold for add-on  -Gold Top - SST       Result                      Value             Ref Range           Extra Tube                                                    Hold for add-ons.            Amount and/or Complexity of Data Reviewed  Clinical lab tests: reviewed        Final diagnoses:   Cellulitis of right lower extremity   Acute kidney injury (MIRI) with acute tubular necrosis (ATN) (CMS/[facility])              Luis Whaley MD  10/07/19 0549

## 2019-10-08 VITALS
OXYGEN SATURATION: 94 % | BODY MASS INDEX: 40.3 KG/M2 | HEART RATE: 85 BPM | TEMPERATURE: 98.4 F | SYSTOLIC BLOOD PRESSURE: 114 MMHG | HEIGHT: 60 IN | RESPIRATION RATE: 18 BRPM | DIASTOLIC BLOOD PRESSURE: 61 MMHG | WEIGHT: 205.25 LBS

## 2019-10-08 PROBLEM — L97.311: Status: ACTIVE | Noted: 2019-10-08

## 2019-10-08 LAB
ANION GAP SERPL CALCULATED.3IONS-SCNC: 9.5 MMOL/L (ref 5–15)
BASOPHILS # BLD AUTO: 0 10*3/MM3 (ref 0–0.2)
BASOPHILS NFR BLD AUTO: 0.2 % (ref 0–1.5)
BUN BLD-MCNC: 23 MG/DL (ref 8–20)
BUN/CREAT SERPL: 19.2 (ref 5.4–26.2)
CALCIUM SPEC-SCNC: 8.3 MG/DL (ref 8.9–10.3)
CHLORIDE SERPL-SCNC: 110 MMOL/L (ref 101–111)
CO2 SERPL-SCNC: 25 MMOL/L (ref 22–32)
CREAT BLD-MCNC: 1.2 MG/DL (ref 0.4–1)
DEPRECATED RDW RBC AUTO: 55.6 FL (ref 37–54)
EOSINOPHIL # BLD AUTO: 0 10*3/MM3 (ref 0–0.4)
EOSINOPHIL NFR BLD AUTO: 0.9 % (ref 0.3–6.2)
ERYTHROCYTE [DISTWIDTH] IN BLOOD BY AUTOMATED COUNT: 16.5 % (ref 12.3–15.4)
GFR SERPL CREATININE-BSD FRML MDRD: 43 ML/MIN/1.73
GLUCOSE BLD-MCNC: 106 MG/DL (ref 65–99)
HCT VFR BLD AUTO: 24.5 % (ref 34–46.6)
HGB BLD-MCNC: 8.4 G/DL (ref 12–15.9)
LYMPHOCYTES # BLD AUTO: 0.6 10*3/MM3 (ref 0.7–3.1)
LYMPHOCYTES NFR BLD AUTO: 15.1 % (ref 19.6–45.3)
MCH RBC QN AUTO: 32.7 PG (ref 26.6–33)
MCHC RBC AUTO-ENTMCNC: 34.3 G/DL (ref 31.5–35.7)
MCV RBC AUTO: 95.3 FL (ref 79–97)
MONOCYTES # BLD AUTO: 0.4 10*3/MM3 (ref 0.1–0.9)
MONOCYTES NFR BLD AUTO: 9 % (ref 5–12)
NEUTROPHILS # BLD AUTO: 2.9 10*3/MM3 (ref 1.7–7)
NEUTROPHILS NFR BLD AUTO: 74.8 % (ref 42.7–76)
NRBC BLD AUTO-RTO: 0.2 /100 WBC (ref 0–0.2)
PLATELET # BLD AUTO: 111 10*3/MM3 (ref 140–450)
PMV BLD AUTO: 7.5 FL (ref 6–12)
POTASSIUM BLD-SCNC: 3.5 MMOL/L (ref 3.6–5.1)
RBC # BLD AUTO: 2.57 10*6/MM3 (ref 3.77–5.28)
SODIUM BLD-SCNC: 141 MMOL/L (ref 136–144)
URATE SERPL-MCNC: 5 MG/DL (ref 2.6–8)
VANCOMYCIN SERPL-MCNC: 7.3 MCG/ML (ref 5–25)
WBC NRBC COR # BLD: 3.9 10*3/MM3 (ref 3.4–10.8)

## 2019-10-08 PROCEDURE — 99213 OFFICE O/P EST LOW 20 MIN: CPT | Performed by: NURSE PRACTITIONER

## 2019-10-08 PROCEDURE — 99217 PR OBSERVATION CARE DISCHARGE MANAGEMENT: CPT | Performed by: HOSPITALIST

## 2019-10-08 PROCEDURE — 25010000002 VANCOMYCIN 10 G RECONSTITUTED SOLUTION: Performed by: HOSPITALIST

## 2019-10-08 PROCEDURE — 84550 ASSAY OF BLOOD/URIC ACID: CPT | Performed by: NURSE PRACTITIONER

## 2019-10-08 PROCEDURE — G0378 HOSPITAL OBSERVATION PER HR: HCPCS

## 2019-10-08 PROCEDURE — 80048 BASIC METABOLIC PNL TOTAL CA: CPT | Performed by: NURSE PRACTITIONER

## 2019-10-08 PROCEDURE — 80202 ASSAY OF VANCOMYCIN: CPT | Performed by: HOSPITALIST

## 2019-10-08 PROCEDURE — 85025 COMPLETE CBC W/AUTO DIFF WBC: CPT | Performed by: NURSE PRACTITIONER

## 2019-10-08 RX ORDER — POTASSIUM CHLORIDE 20 MEQ/1
10 TABLET, EXTENDED RELEASE ORAL DAILY
Qty: 30 TABLET | Refills: 0 | Status: SHIPPED | OUTPATIENT
Start: 2019-10-08 | End: 2020-10-09

## 2019-10-08 RX ORDER — DOXYCYCLINE HYCLATE 100 MG/1
100 TABLET, DELAYED RELEASE ORAL 2 TIMES DAILY
Qty: 14 TABLET | Refills: 0 | Status: SHIPPED | OUTPATIENT
Start: 2019-10-08 | End: 2019-10-15

## 2019-10-08 RX ORDER — FUROSEMIDE 40 MG/1
20 TABLET ORAL DAILY
Qty: 30 TABLET | Refills: 0 | Status: SHIPPED | OUTPATIENT
Start: 2019-10-08 | End: 2020-10-09

## 2019-10-08 RX ADMIN — LEVOTHYROXINE SODIUM 137 MCG: 25 TABLET ORAL at 05:39

## 2019-10-08 RX ADMIN — VANCOMYCIN HYDROCHLORIDE 1250 MG: 10 INJECTION, POWDER, LYOPHILIZED, FOR SOLUTION INTRAVENOUS at 10:49

## 2019-10-08 RX ADMIN — PANTOPRAZOLE SODIUM 40 MG: 40 TABLET, DELAYED RELEASE ORAL at 05:39

## 2019-10-08 RX ADMIN — CARBIDOPA AND LEVODOPA 1 TABLET: 25; 100 TABLET ORAL at 05:39

## 2019-10-08 RX ADMIN — BACITRACIN: 500 OINTMENT TOPICAL at 10:49

## 2019-10-08 RX ADMIN — FLUTICASONE PROPIONATE 2 SPRAY: 50 SPRAY, METERED NASAL at 10:49

## 2019-10-08 RX ADMIN — CYANOCOBALAMIN TAB 1000 MCG 1000 MCG: 1000 TAB at 09:38

## 2019-10-08 RX ADMIN — MAGNESIUM OXIDE TAB 400 MG (241.3 MG ELEMENTAL MG) 400 MG: 400 (241.3 MG) TAB at 09:38

## 2019-10-08 RX ADMIN — BACLOFEN 5 MG: 10 TABLET ORAL at 09:38

## 2019-10-08 RX ADMIN — ALLOPURINOL 100 MG: 100 TABLET ORAL at 09:38

## 2019-10-08 RX ADMIN — PREGABALIN 50 MG: 25 CAPSULE ORAL at 09:37

## 2019-10-08 NOTE — DISCHARGE PLACEMENT REQUEST
"Lorenza Lee (84 y.o. Female)     Date of Birth Social Security Number Address Home Phone MRN    1935  [address]   [facility] [place] [facility] [phone] [id number]    Sikh Marital Status          Anabaptism        Admission Date Admission Type Admitting Provider Attending Provider Department, Room/Bed    10/7/19 Emergency Reyna Yao MD Jaisinghani, Salgram, MD [facility] 3C MEDICAL INPATIENT, 358/1    Discharge Date Discharge Disposition Discharge Destination         Home-Health Care [facility]              Attending Provider:  Reyna Yao MD    Allergies:  Latex    Isolation:  None   Infection:  None   Code Status:  CPR    Ht:  152.4 cm (60\")   Wt:  93.1 kg (205 lb 4 oz)    Admission Cmt:  None   Principal Problem:  Cellulitis of right lower extremity [L03.115]                 Active Insurance as of 10/7/2019     Primary Coverage     Payor Plan Insurance Group Employer/Plan Group    MEDICARE MEDICARE A & B      Payor Plan Address Payor Plan Phone Number Payor Plan Fax Number Effective Dates    [address] [phone]  6/1/1998 - None Entered    [facility] [id number]       Subscriber Name Subscriber Birth Date Member ID       LORENZA LEE 1935 [id number]           Secondary Coverage     Payor Plan Insurance Group Employer/Plan Group    AARP MED SUPP AARP HEALTH CARE OPTIONS      Payor Plan Address Payor Plan Phone Number Payor Plan Fax Number Effective Dates    [facility] [phone]  1/1/2019 - None Entered    [address]       [facility] [id number]       Subscriber Name Subscriber Birth Date Member ID       LORENZA LEE 1935 [id number]                 Emergency Contacts      (Rel.) Home Phone Work Phone Mobile Phone    Vicky Krueger (Daughter) [phone] -- [phone]    IDA PLATT (Daughter) [phone] -- --            Insurance Information                MEDICARE/MEDICARE A & B Phone: [phone]    " "Subscriber: Lorenza Owens Subscriber#: [id number]    Group#:  Precert#:         [facility] MED SUPP/[facility] HEALTH CARE OPTIONS Phone: [phone]    Subscriber: Lorenza Owens Subscriber#: [id number]    Group#:  Precert#:              History & Physical      Kunal-Cata Azevedo APRN at 10/07/19 0732     Attestation signed by Reyna Yao MD at 10/07/19 1025    Patient seen and examined, chart reviewed, agree with above.   Patient was doing well up to the evening as per daughter when she spoke to her around 6 PM, she said she was called that she is not doing better afterwards, she was noted to have chills, noted a spiking fever.  Patient has the chronic venous stasis involving both lower legs, there is a concern may be she is developing cellulitis in the region.  UA revealed 3-5 WBC, chest x-ray was not done.    Vitals reviewed    Chest bilateral entry  Vascular S1-S2 there is no murmur  Lower extremity revealed possible redness and swelling involving the lower leg.     Impression  Cellulitis lower leg  Possible UTI   Acute kidney injury   Chronic venous stasis.    Plan  Iv cefepime and vancomycin for now.  X.ray chest and viral panel  Hold lasix   Gentle hydration of normal saline for one litre  Avoid nephro toxic medication  Further management per clinical course.  Lovenox for dvt prophylaxis.                         [facility]            Primary Care Provider:  Leobardo Kim APRN    Patient Care Team:  Leobardo Kim APRN as PCP - General    CHIEF COMPLAINT: \"I have a wound behind my knee.  How do I order my breakfast?\"    Chief Complaint   Patient presents with   • Wound Check     HISTORY OF PRESENT ILLNESS:    Ms. Owens is an 84 year old female with a past medical history significant for Gout, chronic depression, chronic pain with narcotic dependency, chronic HTN, asthma, Parkinson's disease,chronic constipation, hypothyroidism, GERD, anemia, " "Fibromyalgia, arthritis, diastolic heart failure with preserved EF, chronic venous stasis, and wheelchair dependent.      She reported to the ER today with complaints of a wound behind her knee.  At first she indicated it was behind her left knee, but I could not appreciate any type of wound.  She then said well no, its on my right leg.  I told her that I noted chronic changes to her lower extremities, but did not appreciate any type of open wound.  Additionally, she can not tell me if she was seen by her PCP or if she has been on any antibiotics up to this point.  According to ER notes, she was placed on a course of Clindamycin for suspected cellulitis.      Work up in the ER showed creatinine 1.5, WBC 9.9, Lactic 1.8. Urinalysis showed hematuria, leukocytosis, and proteinuria.  She was treated for a UTI in July with a culture positive for E.coli.  She denies any dysuria, fevers, chills, nausea, or diarrhea.  She is very sleepy and is having a hard time holding a conversation.  She states she is \"just tired.\"  Past admission showed hypercapnic respiratory failure thought to be due to polypharmacy.  She was requesting additional pain medication in the ER which she did not receive secondary to some noted hypotension.  Systolic's were in the low 100's.     She received a dose of Vancomycin and Zosyn for suspected cellulitis in the ER.  She will be admitted for further work up and evaluation.       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    No family history on file.    Social History     Tobacco Use   • Smoking status: Never Smoker   • Smokeless tobacco: Never Used   Substance Use Topics   • Alcohol use: No     Frequency: Never   • Drug use: No       Medications Prior to Admission   Medication Sig Dispense Refill Last Dose   • allopurinol (ZYLOPRIM) 100 MG tablet Take 100 mg by mouth Daily.      • baclofen (LIORESAL) 10 MG tablet Take 10 mg by mouth 3 (Three) Times a Day.      • cetirizine (zyrTEC) 10 MG tablet Take 10 mg by mouth Daily.      • fenofibrate (TRICOR) 145 MG tablet Take 145 mg by mouth Daily.      • fluticasone (FLONASE) 50 MCG/ACT nasal spray 2 sprays into the nostril(s) as directed by provider Daily.      • furosemide (LASIX) 40 MG tablet Take 40 mg by mouth 2 (Two) Times a Day.      • lubiprostone (AMITIZA) 24 MCG capsule Take 24 mcg by mouth 2 (Two) Times a Day With Meals.      • magnesium oxide (MAGOX) 400 (241.3 Mg) MG tablet tablet Take 400 mg by mouth Daily.      • melatonin 5 MG tablet tablet Take 5 mg by mouth.      • polyethylene glycol (MIRALAX) packet Take 17 g by mouth Daily.      • potassium chloride (K-DUR,KLOR-CON) 20 MEQ CR tablet Take 20 mEq by mouth 2 (Two) Times a Day.      • pregabalin (LYRICA) 75 MG capsule LYRICA 75 MG CAPS      • raNITIdine (ZANTAC) 75 MG tablet Take 75 mg by mouth 2 (Two) Times a Day.      • rOPINIRole (REQUIP) 4 MG tablet Take 3 mg by mouth Every Night.      • senna (SENOKOT) 8.6 MG tablet tablet Take  by mouth Every Night.      • simethicone (MYLICON) 80 MG chewable tablet Chew 80 mg Every 6 (Six) Hours As Needed for Flatulence.      • vitamin B-12 (CYANOCOBALAMIN) 1000 MCG tablet Take 1,000 mcg by mouth Daily.      • vitamin C (ASCORBIC ACID) 500 MG tablet Take 500 mg by mouth Daily.      • vitamin D (ERGOCALCIFEROL) 99481 units capsule capsule Take 50,000  "Units by mouth 1 (One) Time Per Week.      • carbidopa-levodopa (SINEMET)  MG per tablet SINEMET  MG TABS      • DULoxetine (CYMBALTA) 60 MG capsule CYMBALTA 60 MG CPEP      • esomeprazole (NexIUM) 40 MG packet NEXIUM PACK      • ferrous gluconate (FERGON) 324 MG tablet FERROUS GLUCONATE 324 (38 Fe) MG TABS      • HYDROcodone-acetaminophen (NORCO)  MG per tablet NORCO  MG TABS      • levothyroxine (SYNTHROID) 100 MCG tablet SYNTHROID 100 MCG TABS      • Levothyroxine Sodium (SYNTHROID PO) SYNTHROID TABS      • mupirocin (BACTROBAN) 2 % cream Apply  topically to the appropriate area as directed 3 (Three) Times a Day. 30 g 0    • pregabalin (LYRICA) 100 MG capsule LYRICA CAPS          Allergies:  Latex    Immunization History   Administered Date(s) Administered   • TD Preservative Free 06/17/2019           REVIEW OF SYSTEMS:     Review of Systems   Constitution: Positive for malaise/fatigue.   Skin: Positive for color change and poor wound healing.   All other systems reviewed and are negative.      Vital Signs  Temp:  [98.1 °F (36.7 °C)-99.3 °F (37.4 °C)] 98.1 °F (36.7 °C)  Heart Rate:  [85-98] 85  Resp:  [16-18] 16  BP: (100-120)/(50-61) 100/50    Flowsheet Rows      First Filed Value   Admission Height  152.4 cm (60\") Documented at 10/07/2019 0228   Admission Weight  90.7 kg (200 lb) Documented at 10/07/2019 0228           Physical Exam:    Physical Exam   Constitutional: She appears well-developed.   HENT:   Head: Normocephalic and atraumatic.   Eyes: EOM are normal. Pupils are equal, round, and reactive to light.   Neck: Normal range of motion.   Cardiovascular: Normal rate and regular rhythm.   Pulmonary/Chest: Effort normal and breath sounds normal.   Abdominal: Soft. Bowel sounds are normal.   Musculoskeletal: Normal range of motion. She exhibits edema.   Neurological:   Sleepy, but arousable   Skin: Skin is warm.   Face appears sweaty. Lower extremity darkening of the skin with some " skin tears to the right shin.  Left leg cut she states she got from her wheelchair.          Results Review:      I reviewed the patient's new clinical results.    Lab Results (most recent)     Procedure Component Value Units Date/Time    Urinalysis With Culture If Indicated - Urine, Catheter [[id number]]  (Abnormal) Collected:  10/07/19 0534    Specimen:  Urine, Catheter Updated:  10/07/19 0557     Color, UA Dark Yellow     Appearance, UA Clear     pH, UA 5.5     Specific Gravity, UA 1.022     Glucose, UA Negative     Ketones, UA Negative     Bilirubin, UA Negative     Blood, UA Small (1+)     Protein, UA Trace     Leuk Esterase, UA Trace     Nitrite, UA Negative     Urobilinogen, UA 1.0 E.U./dL    Urinalysis, Microscopic Only - Urine, Catheter [[id number]]  (Abnormal) Collected:  10/07/19 0534    Specimen:  Urine, Catheter Updated:  10/07/19 0557     RBC, UA 0-2 /HPF      WBC, UA 3-5 /HPF      Bacteria, UA Trace /HPF      Squamous Epithelial Cells, UA 7-12 /HPF      Hyaline Casts, UA 3-6 /LPF      Methodology Automated Microscopy    [place] Draw [[id number]] Collected:  10/07/19 0418    Specimen:  Blood Updated:  10/07/19 0530    Narrative:       The following orders were created for panel order [place] Draw.  Procedure                               Abnormality         Status                     ---------                               -----------         ------                     Light Blue Top[[id number]]                                   Final result               Green Top (Gel)[[id number]]                                  Final result               Lavender Top[[id number]]                                     Final result               Gold Top - SST[[id number]]                                   Final result                 Please view results for these tests on the individual orders.    Light Blue Top [[id number]] Collected:  10/07/19 0418    Specimen:  Blood Updated:  10/07/19 0530     Extra Tube hold for add-on      Comment: Auto resulted       Green Top (Gel) [[id number]] Collected:  10/07/19 0418    Specimen:  Blood Updated:  10/07/19 0530     Extra Tube Hold for add-ons.     Comment: Auto resulted.       Lavender Top [[id number]] Collected:  10/07/19 0418    Specimen:  Blood Updated:  10/07/19 0530     Extra Tube hold for add-on     Comment: Auto resulted       Gold Top - SST [[id number]] Collected:  10/07/19 0418    Specimen:  Blood Updated:  10/07/19 0530     Extra Tube Hold for add-ons.     Comment: Auto resulted.       Blood Culture - Blood, Wrist, Left [[id number]] Collected:  10/07/19 0418    Specimen:  Blood from Wrist, Left Updated:  10/07/19 0502    Comprehensive Metabolic Panel [[id number]]  (Abnormal) Collected:  10/07/19 0418    Specimen:  Blood Updated:  10/07/19 0457     Glucose 134 mg/dL      BUN 29 mg/dL      Creatinine 1.50 mg/dL      Sodium 142 mmol/L      Potassium 4.9 mmol/L      Comment: Specimen hemolyzed.  Results may be affected.        Chloride 106 mmol/L      CO2 26.0 mmol/L      Calcium 8.7 mg/dL      Total Protein 6.7 g/dL      Albumin 3.40 g/dL      ALT (SGPT) 6 U/L      Comment: Specimen hemolyzed.  Results may be affected.        AST (SGOT) 33 U/L      Comment: Specimen hemolyzed.  Results may be affected.        Alkaline Phosphatase 58 U/L      Total Bilirubin 0.7 mg/dL      Comment: Specimen hemolyzed.  Results may be affected.        eGFR Non African Amer 33 mL/min/1.73      Globulin 3.3 gm/dL      A/G Ratio 1.0 g/dL      BUN/Creatinine Ratio 19.3     Anion Gap 14.9 mmol/L     Narrative:       The MDRD GFR formula is only valid for adults with stable renal function between ages 18 and 70.    CBC & Differential [[id number]] Collected:  10/07/19 0418    Specimen:  Blood Updated:  10/07/19 0429    Narrative:       The following orders were created for panel order CBC & Differential.  Procedure                               Abnormality         Status                     ---------                                -----------         ------                     CBC Auto Differential[[id number]]        Abnormal            Final result                 Please view results for these tests on the individual orders.    CBC Auto Differential [[id number]]  (Abnormal) Collected:  10/07/19 0418    Specimen:  Blood Updated:  10/07/19 0429     WBC 9.90 10*3/mm3      RBC 2.90 10*6/mm3      Hemoglobin 9.5 g/dL      Hematocrit 27.5 %      MCV 94.7 fL      MCH 32.6 pg      MCHC 34.4 g/dL      RDW 16.2 %      RDW-SD 54.7 fl      MPV 7.9 fL      Platelets 177 10*3/mm3      Neutrophil % 90.2 %      Lymphocyte % 4.1 %      Monocyte % 5.4 %      Eosinophil % 0.1 %      Basophil % 0.2 %      Neutrophils, Absolute 8.90 10*3/mm3      Lymphocytes, Absolute 0.40 10*3/mm3      Monocytes, Absolute 0.50 10*3/mm3      Eosinophils, Absolute 0.00 10*3/mm3      Basophils, Absolute 0.00 10*3/mm3      nRBC 0.0 /100 WBC     POC Lactate [[id number]]  (Normal) Collected:  10/07/19 0426    Specimen:  Blood Updated:  10/07/19 0427     Lactate 1.8 mmol/L      Comment: Serial Number: [id number]:  [id number]       Blood Culture - Blood, Arm, Left [[id number]] Collected:  10/07/19 0409    Specimen:  Blood from Arm, Left Updated:  10/07/19 0413          Imaging Results (most recent)     None        reviewed    ECG/EMG Results (most recent)     None        reviewed    Results for orders placed during the hospital encounter of 18   Duplex Venous Lower Extremity - Bilateral CAR    Narrative                   Lower Extremity Venous Report                          [facility]                         Vascular Laboratory                            [address] [place] [address]    Name: LEVI LEE SRINI                  Study Date: 08/15/2018 10:28 AM  MRN: [id number]                       Patient Location: 3A  : 1935 (M/d/yyyy)           Gender: Female  Age: 83 yrs                          Account#: [id number]  Reason  For Study: elevated d-dimer      Procedure  Venous study was carried out in bilateral lower extremities. Gray scale, color  flow, and spectral doppler images were obtained. Images were obtained in  transverse and longitudinal views. Technically satisfactory study.    Right Lower Extremity Venous  On the right side the patient has patent common femoral, femoral, and  popliteal veins. The main veins are easily compressible. Femoral vein was  mapped in its entirety through the course of the thigh. It is patent and  compresses well. The popliteal vein is patent along with its tributaries and  compresses well with no evidence of any filling defect. Augmentation test is  positive. Respiratory waves are biphasic. Right posterior tibial vein is  patent. Right peroneal vein not visualized. The right greater and small  saphenous veins are patent with good compressibility.    Left Lower Extremity Venous  On the left side the patient has patent common femoral, femoral, and popliteal  veins. The main veins compress well. Femoral vein was mapped in its entirety  through the course of the thigh. It is patent and compresses well. The  popliteal vein is patent along with its tributaries and compresses well with  no evidence of any filling defect. Augmentation test is positive. Respiratory  waves are biphasic. Left posterior tibial vein is patent. Left peroneal vein  not visualized. The left greater and small saphenous veins are patent with  good compressibility.      Interpretation Summary  No evidence of any deep vein thrombosis or superficial vein thrombosis.  _______________________________________________________________________________    Electronically signed by: MD Violet Robles  on 08/15/2018 04:43 PM    Ordering Physician: FRANCISCO IBARRA  Referring Physician: LAKESHA BESS  Performed By: OSKAR         Results for orders placed during the hospital encounter of 08/11/18   Adult Transthoracic Echo Complete W/ Cont if Necessary  Per Protocol    Narrative                           Adult Echocardiogram Report          [facility]  Cardiology Department  [address] [place]  [address]      Name: LEVI LEE       Study Date: 08/15/2018 09:47 AM      BP: 168/79 mmHg  MRN: [id number]            Patient Location:   : 1935           Gender: Female                       Height: 60 in  Age: 83 yrs               Account#: [id number]                Weight: 197 lb  Reason For Study: SOA/WEAKNESS                                 BSA: 1.9 m2  Ordering Physician: FRANCISCO IBARRA  Referring Physician:  LAKESHA BESS  Performed By: AM      M-Mode/2-D Measurements:  LVIDd: 4.7 cm       (3.7-5.7) LVPWd: 1.1 cm        (0.8-1.2)  LVIDs: 3.1 cm       (2.3-3.9)  ACS: 1.8 cm         (1.6-3.7) IVSd: 0.93 cm        (0.7-1.2)  LA dimension: 3.7 cm(1.9-4.0) RVDd: 2.6 cm         (0.7-2.4)  FS: 34.6 %          (21-40%)  Ao root diam: 3.0 cm (2.0-3.7)    Procedure  A 2D, M-mode, color flow and doppler echocardiogram was performed.    Comments  The left ventricle is normal in size. There is normal left ventricular wall  thickness. Left ventricular systolic function is normal. Ejection Fraction is  55-60%. The transmitral spectral Doppler flow pattern is suggestive of  impaired LV relaxation. No regional wall motion abnormalities noted. The right  ventricle is grossly normal size. Right ventricular function cannot be  assessed due to poor image quality. The left atrial size is normal. The right  atrium is mild to moderately dilated. The mitral valve is grossly normal in  structure. There is trace mitral regurgitation. The tricuspid valve is not  well visualized. Mild aortic valve calcification. Mild valvular aortic  stenosis. Peak/mean ACV gradients are 16/10 mm Hg respectively. Calculated HARLEY  is 1.9 sq cm. No aortic regurgitation is present. The pulmonic valve is not  well visualized. The aortic root is normal size. The pulmonary is not  well  visualized. There is no pericardial effusion.      Interpretation  Left ventricular systolic function is normal.  Ejection Fraction is 55-60%  The transmitral spectral Doppler flow pattern is suggestive of impaired LV  relaxation.  The right atrium is mild to moderately dilated.  There is trace mitral regurgitation.  Mild aortic valve calcification.  Mild valvular aortic stenosis.  Peak/mean ACV gradients are 16/10 mm Hg respectively. Calculated HARLEY is 1.9 sq  cm.  The study was technically difficult.      MMode/2D Measurements & Calculations  ESV(Teich): 37.5 ml                  Ao root area: 7.2 cm2  EF(Teich): 63.7 %  LVOT diam: 2.0 cm                    EDV(MOD-sp4): 60.5 ml                                       ESV(MOD-sp4): 27.0 ml                                       EF(MOD-sp4): 55.3 %    Doppler Measurements & Calculations  MV E max gretel: 56.1 cm/sec                MV max P.3 mmHg  MV A max gretel: 151.5 cm/sec               MV mean P.6 mmHg  MV E/A: 0.37  MV dec slope: 567.6 cm/sec2              Ao V2 max: 200.6 cm/sec  MV dec time: 0.20 sec                    Ao max P.1 mmHg                                           Ao V2 mean: 156.1 cm/sec                                           Ao mean PG: 10.2 mmHg                                           Ao V2 VTI: 48.3 cm                                           HARLEY(I,D): 1.9 cm2                                             HARLEY(V,D): 2.1 cm2  LV V1 max P.1 mmHg                   PA max P.5 mmHg  LV V1 mean PG: 3.4 mmHg  LV V1 max: 133.7 cm/sec  LV V1 mean: 84.6 cm/sec  LV V1 VTI: 29.3 cm    _______________________________________________________________________________      Electronically signed by: MD Vivek Mcdowell  on 2018 07:43 AM         XR Chest 1 View  DATE OF SERVICE:  2017 12:10 PM    PROCEDURE:  XR CHEST PORTABLE 1 VIEW    HISTORY:  Fever    COMPARISON:  2015    TECHNIQUE:  A radiologic portable AP  view of the chest was obtained.    DISCUSSION:  The patient is slightly rotated.  There is linear atelectasis within the left  lung base that appears similar to prior exam.  No new focal airspace  consolidation is identified.  Mild cardiomegaly is unchanged.  There is a mild  pulmonary edema pattern.  The osseous structures appear intact.    IMPRESSION:    1. Mild cardiomegaly with mild pulmonary edema pattern.  2. Stable linear atelectasis within left lung base.    Hamlet Bull MD    DS: 09/26/2017 12:45  Report ID: [id number]  cc:  JOSHUA STEPHENS    FINAL AUTHENTICATED COPY  CT Head Without Contrast  DATE OF SERVICE:  9/26/2017 12:13 PM    PROCEDURE:  CT HEAD WO    HISTORY:  Fever right arm numbness/weakness    COMPARISON:  None.    TECHNIQUE:  Routine transaxial cuts were obtained through the head without the  administration of contrast.    DISCUSSION:  Bilateral lens replacements are in place.  No acute intracranial hemorrhage or  extra-axial collection is identified.  The ventricles appear normal in  caliber, with no evidence of mass effect or midline shift.  The basal cisterns  appear patent.  The gray-white differentiation appears preserved.  A few  scattered foci of periventricular and subcortical white matter hypodensities  are nonspecific, but likely the sequela mild chronic small vessel ischemic  disease.    The calvarium appears intact.  The paranasal sinuses and mastoid air cells are  well aerated.    IMPRESSION:    1. No acute intracranial process identified.  2. Findings suggestive of mild chronic small vessel ischemic disease.    Hamlet Bull MD    DS: 09/26/2017 12:24  Report ID: [id number]  cc:  JOSHUA STEPHENS    FINAL AUTHENTICATED COPY      Assessment/Plan       Cellulitis of right lower extremity          Assessment/Plan:     Acute renal failure with possible ATN/prerenal   -At admission, she was noted to have some hypotension with systolic's in the 100's.  Baseline creatinine around 1.0 in 2018, today  1.5.  No acidosis noted.  Has been on Lasix at home. She received a liter bolus in the ER.  -Hold Lasix  -Gentle hydration, NS at 75mL/hr  -Monitor creatinine, hold nephrotoxic drugs    Excessive sleepiness  -History of hypercapnic respiratory failure secondary to polypharmacy.  -Check ABG    ?Lower extremity cellulitis  -I do not appreciate cellulitis to the lower extremity.  She does have chronic changes to the lower extremities likely secondary to venous stasis vs peripheral vascular disease.  Received Vanc and Zosyn in the ER.  -No clinical indication for additional antibiotics for cellulitis   -Consult wound care    Abnormal urinalysis  -Prior positive culture in July for E.coli with susceptibility to Rocephin.  She has no urinary symptoms at this time.  She presented with a low grade fever of 99.3.    -Follow up urine culture and treat if clinically indicated    Normocytic anemia  -Hgb 9.5 within baseline  -Continue Ferrous Sulfate QOD    Diastolic heart failure with preserved EF  -No acute exacerbation noted at this time  -Hold Lasix due to renal function  -Check BNP    Chronic Asthma, no acute exacerbation  -Continue Zyrtec, Flonase    Chronic pain syndrome with chronic immobility  -Continue Cymbalta  -Hold Baclofen due to renal function and increased sleepiness   -INSPECT verified, no current Rx for Lyrica or Hydrocodone     Parkinson's Disease  -Continue Carbidopa-levodopa and Requip     Chronic Gout  -Check Uric Acid level  -Hold Allopurinol until renal function returns to baseline     Hyperlipidemia  -Continue Tricor    GERD  -Continue PPI    Hypothyroidism  -Continue Levothyroxine     DVT Prophylaxis  -Lovenox with renally adjusted dose     Will reconcile home medications once nursing has reconciled them.  Discussed with RN who will call [facility] to request MAR.      Disposition    Observation     I discussed the patients findings and my recommendations with patient.  No family at the bedside.       Cata Culp, APRN  10/07/19  7:33 AM            Electronically signed by Reyna Yao MD at 10/07/19 1025       Orders (last 24 hrs)     Start     Ordered    10/09/19 0600  Vancomycin, Random  Morning Draw      10/08/19 0903    10/08/19 1349  Patient to have dressing changes completed by home health care after discharge. (cover ulcerations with silicone foam border dressing and wrap with acewrap) Every three days change silicone foam border dressing (assess daily and change sooner if s...  Once     Comments:  Patient to have dressing changes completed by home health care after discharge. (cover ulcerations with silicone foam border dressing and wrap with acewrap)  Every three days change silicone foam border dressing (assess daily and change sooner if soiled)  Every day unwrap and reapply acewarp    10/08/19 1402    10/08/19 1122  Discontinue IV  Once      10/08/19 1122    10/08/19 1122  Obtain Discharge Clearance  Until Discontinued      10/08/19 1122    10/08/19 1118  Discharge patient  Once      10/08/19 1122    10/08/19 1100  cefepime 1 gm IVPB in 100 mL NS ([facility]BP)  Every 12 Hours      10/08/19 0904    10/08/19 1000  vancomycin 1250 mg/250 mL 0.9% NS IVPB ([facility])  Once      10/08/19 0903    10/08/19 0953  Elevate Heels Off of Bed  Until Discontinued      10/08/19 0953    10/08/19 0953  Turn Patient  Now Then Every 2 Hours      10/08/19 0953    10/08/19 0953  Use Repositioning Wedge to Position Patient  Continuous      10/08/19 0953    10/08/19 0953  Head of Bed 30 Degrees or Less  Until Discontinued      10/08/19 0953    10/08/19 0953  Do NOT Rub or Massage Any Bony Prominence  Continuous      10/08/19 0953    10/08/19 0953  Wound Care  Every Third Day      10/08/19 0953    10/08/19 0953  Compression  Per Order Details     Comments:  May remove at nigh    10/08/19 0953    10/08/19 0953  Use Seat Cushion When Up In Chair  Continuous      10/08/19 0953    10/08/19 0938  PT Consult: Eval & Treat Non  Weight Bearing; Functional Mobility Below Baseline  Once      10/08/19 0937    10/08/19 0600  Basic Metabolic Panel  Morning Draw      10/07/19 0620    10/08/19 0600  CBC Auto Differential  Morning Draw      10/07/19 0620    10/08/19 0600  Uric Acid  Morning Draw      10/07/19 0831    10/08/19 0600  Creatinine, Serum  Daily      10/07/19 1036    10/08/19 0600  Vancomycin, Random  Morning Draw      10/07/19 1036    10/08/19 0000  furosemide (LASIX) 40 MG tablet  Daily      10/08/19 1122    10/08/19 0000  potassium chloride (K-DUR,KLOR-CON) 20 MEQ CR tablet  Daily      10/08/19 1122    10/08/19 0000  doxycycline (DORYX) 100 MG enteric coated tablet  2 Times Daily      10/08/19 1122    10/08/19 0000  Discharge Follow-up with PCP      10/08/19 1122    10/08/19 0000  Diet: Cardiac, Regular      10/08/19 1122    10/08/19 0000  Discharge Follow-up with Specified Provider: Family physician in a week time.; 1 Week      10/08/19 1122    10/07/19 2100  rOPINIRole (REQUIP) tablet 3 mg  Nightly      10/07/19 1016    10/07/19 1600  enoxaparin (LOVENOX) syringe 30 mg  Every 24 Hours      10/07/19 0823    10/07/19 1130  cefepime 1 gm IVPB in 100 mL NS ([facility]BP)  Every 24 Hours,   Status:  Discontinued      10/07/19 1018    10/07/19 1130  levothyroxine (SYNTHROID, LEVOTHROID) tablet 137 mcg  Every Early Morning      10/07/19 1036    10/07/19 1130  pregabalin (LYRICA) capsule 50 mg  Every 12 Hours Scheduled      10/07/19 1036    10/07/19 1130  baclofen (LIORESAL) tablet 5 mg  3 Times Daily      10/07/19 1036    10/07/19 1115  allopurinol (ZYLOPRIM) tablet 100 mg  Daily      10/07/19 1016    10/07/19 1115  fluticasone (FLONASE) 50 MCG/ACT nasal spray 2 spray  Daily      10/07/19 1016    10/07/19 1115  magnesium oxide (MAGOX) tablet 400 mg  Daily      10/07/19 1016    10/07/19 1115  bacitracin ointment  Every 24 Hours Scheduled      10/07/19 1016    10/07/19 1115  vitamin B-12 (CYANOCOBALAMIN) tablet 1,000 mcg  Daily      10/07/19 1016     10/07/19 1100  carbidopa-levodopa (SINEMET)  MG per tablet 1 tablet  Every 8 Hours Scheduled      10/07/19 1016    10/07/19 1100  pantoprazole (PROTONIX) EC tablet 40 mg  Every Early Morning      10/07/19 1016    10/07/19 1045  sodium chloride 0.9 % infusion  Once      10/07/19 1023    10/07/19 1036  HYDROcodone-acetaminophen (NORCO) 7.5-325 MG per tablet 1 tablet  Every 6 Hours PRN      10/07/19 1037    10/07/19 1035  vancomycin 1000 mg/250 mL 0.9% NS IVPB ([facility])  As Needed      10/07/19 1036    10/07/19 1018  Pharmacy to dose vancomycin  Continuous PRN      10/07/19 1018    10/07/19 1015  simethicone (MYLICON) chewable tablet 80 mg  Every 6 Hours PRN      10/07/19 1016    10/07/19 0915  sodium chloride 0.9 % infusion  Continuous      10/07/19 0818    10/07/19 0900  sodium chloride 0.9 % flush 10 mL  Every 12 Hours Scheduled      10/07/19 0620    10/07/19 0622  sodium chloride 0.9 % infusion  Continuous,   Status:  Discontinued      10/07/19 0620    10/07/19 0619  acetaminophen (TYLENOL) tablet 650 mg  Every 4 Hours PRN      10/07/19 0620    10/07/19 0619  acetaminophen (TYLENOL) 160 MG/5ML solution 650 mg  Every 4 Hours PRN      10/07/19 0620    10/07/19 0619  acetaminophen (TYLENOL) suppository 650 mg  Every 4 Hours PRN      10/07/19 0620    10/07/19 0618  sodium chloride 0.9 % flush 10 mL  As Needed      10/07/19 0620    10/07/19 0618  ondansetron (ZOFRAN) tablet 4 mg  Every 6 Hours PRN      10/07/19 0620    10/07/19 0618  ondansetron (ZOFRAN) injection 4 mg  Every 6 Hours PRN      10/07/19 0620    10/07/19 0350  sodium chloride 0.9 % flush 10 mL  As Needed      10/07/19 0350    Unscheduled  Oxygen Therapy- Nasal Cannula; 2 LPM; Titrate for SPO2: 92%, Greater Than or Equal To  Continuous PRN      10/07/19 0350    Unscheduled  Up With Assistance  As Needed      10/07/19 0620    Unscheduled  Apply Moisture Barrier After Any Incontinence  As Needed      10/08/19 8538    --  allopurinol (ZYLOPRIM) 100 MG tablet   Daily      10/07/19 0458    --  vitamin B-12 (CYANOCOBALAMIN) 1000 MCG tablet  Daily      10/07/19 0458    --  fenofibrate (TRICOR) 145 MG tablet  Daily      10/07/19 0458    --  fluticasone (FLONASE) 50 MCG/ACT nasal spray  Daily      10/07/19 0458    --  furosemide (LASIX) 40 MG tablet  Daily,   Status:  Discontinued      10/07/19 0458    --  magnesium oxide (MAGOX) 400 (241.3 Mg) MG tablet tablet  Daily      10/07/19 0458    --  melatonin 5 MG tablet tablet      10/07/19 0458    --  polyethylene glycol (MIRALAX) packet  Daily      10/07/19 0458    --  potassium chloride (K-DUR,KLOR-CON) 20 MEQ CR tablet  2 Times Daily,   Status:  Discontinued      10/07/19 0458    --  senna (SENOKOT) 8.6 MG tablet tablet  Nightly      10/07/19 0458    --  simethicone (MYLICON) 80 MG chewable tablet  Every 6 Hours PRN      10/07/19 0458    --  vitamin D (ERGOCALCIFEROL) 13722 units capsule capsule  Weekly      10/07/19 0458    --  cetirizine (zyrTEC) 10 MG tablet  Daily      10/07/19 0458    --  lubiprostone (AMITIZA) 24 MCG capsule  2 Times Daily With Meals      10/07/19 0458    --  raNITIdine (ZANTAC) 75 MG tablet  2 Times Daily      10/07/19 0458    --  vitamin C (ASCORBIC ACID) 500 MG tablet  Daily      10/07/19 0458    --  rOPINIRole (REQUIP) 4 MG tablet  Nightly      10/07/19 0458    --  HYDROcodone-acetaminophen (NORCO) 7.5-325 MG per tablet  Every 6 Hours PRN      10/07/19 1041    --  pregabalin (LYRICA) 50 MG capsule  2 Times Daily      10/07/19 1041    --  baclofen (LIORESAL) 10 MG tablet  3 Times Daily      10/07/19 1041    --  levothyroxine (SYNTHROID, LEVOTHROID) 137 MCG tablet  Daily      10/07/19 1041    --  SCANNED - TELEMETRY        10/07/19 0000    --  SCANNED - TELEMETRY        10/07/19 0000    --  SCANNED - TELEMETRY        10/07/19 0000    --  SCANNED - TELEMETRY        10/07/19 0000

## 2019-10-08 NOTE — PROGRESS NOTES
Continued Stay Note  [facility]     Patient Name: Lorenza Owens  MRN: [id number]  Today's Date: 10/8/2019    Admit Date: 10/7/2019    Discharge Plan     Row Name 10/08/19 1211       Plan    Plan  Anticipate return to [facility] with [facility].     Patient/Family in Agreement with Plan  yes    Plan Comments  Spoke with patient and daughter during multidisciplinary rounds, verified that they are both agreeable for patient to return to [facility] with [facility].           Jaqueline Mir  [phone]

## 2019-10-08 NOTE — PROGRESS NOTES
Wound Initial Evaluation  [facility]     Patient Name: Lorenza Owens  : 1935  MRN: [id number]  Today's Date: 10/8/2019 Room Number: 358/1      Admit Date: 10/7/2019  Attending: Reyna Yao MD    Consult Requested By: Dr. Crenshaw    Reason For Consult: Right lower extremity ulcer    Chief Complaint: She is a very poor historian per patient she bumped her leg on her bed and subsequently developed exudate and redness.  Per patient she has had issues with venous ulcers in the past has home health coming to her assisted living for care of those injuries    Visit Dx:    ICD-10-CM ICD-9-CM   1. Cellulitis of right lower extremity L03.115 682.6   2. Acute kidney injury (MIRI) with acute tubular necrosis (ATN) (CMS/[facility]) N17.0 584.5     Patient Active Problem List   Diagnosis   • Adverse effect of unspecified drugs, medicaments and biological substances, initial encounter   • Anemia   • Anxiety disorder   • Low back pain   • Contusion of scalp, face, or neck, excluding eyes   • Depression   • Edema of lower extremity   • Encounter for general adult medical examination without abnormal findings   • Fibromyalgia   • Gastroesophageal reflux disease   • Hemorrhoids   • Hyperlipidemia   • Hypothyroidism   • Laceration of scalp   • Other injury of unspecified body region   • Other specified dorsopathies, site unspecified   • Peripheral venous insufficiency   • Phlebitis and thrombophlebitis of femoral vein (CMS/[facility])   • Polyosteoarthritis   • Rectal bleeding   • Sleep apnea   • Sleepiness   • Spinal stenosis of lumbar region   • Tendonitis   • Wheelchair dependence   • Cellulitis of right lower extremity   • Acute kidney injury (MIRI) with acute tubular necrosis (ATN) (CMS/HCC)   • Non-pressure chronic ulcer right ankle, 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 (CMS/HCC)       History:   Past Medical History:   Diagnosis Date   • Acute kidney injury (MIRI) with acute tubular necrosis (ATN) (CMS/HCC) 10/7/2019   • Anemia 2013    • Anxiety disorder 6/17/2019   • Cellulitis of right lower extremity 10/7/2019   • Depression 1/23/2013   • Edema of lower extremity 5/17/2017   • Fibromyalgia 7/19/2013   • Gastroesophageal reflux disease 6/17/2019   • Hemorrhoids 4/3/2012   • Hyperlipidemia 7/19/2013   • Hypothyroidism 7/19/2013   • Low back pain 2/24/2016   • Peripheral venous insufficiency 9/26/2011   • Polyosteoarthritis 12/5/2011   • Sleep apnea 6/21/2013   • Wheelchair dependence 7/19/2013     No past surgical history on file.  Social History     Socioeconomic History   • Marital status:      Spouse name: Not on file   • Number of children: Not on file   • Years of education: Not on file   • Highest education level: Not on file   Tobacco Use   • Smoking status: Never Smoker   • Smokeless tobacco: Never Used   Substance and Sexual Activity   • Alcohol use: No     Frequency: Never   • Drug use: No   • Sexual activity: Defer       Allergies:  Allergies   Allergen Reactions   • Latex Rash       Medications:    Current Facility-Administered Medications:   •  acetaminophen (TYLENOL) tablet 650 mg, 650 mg, Oral, Q4H PRN, 650 mg at 10/07/19 2132 **OR** acetaminophen (TYLENOL) 160 MG/5ML solution 650 mg, 650 mg, Oral, Q4H PRN **OR** acetaminophen (TYLENOL) suppository 650 mg, 650 mg, Rectal, Q4H PRN, Carmen Hollins APRN  •  allopurinol (ZYLOPRIM) tablet 100 mg, 100 mg, Oral, Daily, Reyna Yao MD, 100 mg at 10/08/19 0938  •  bacitracin ointment, , Topical, Q24H, Reyna Yao MD  •  baclofen (LIORESAL) tablet 5 mg, 5 mg, Oral, TID, Reyna Yao MD, 5 mg at 10/08/19 0938  •  carbidopa-levodopa (SINEMET)  MG per tablet 1 tablet, 1 tablet, Oral, Q8H, Reyna Yao MD, 1 tablet at 10/08/19 0539  •  cefepime 1 gm IVPB in 100 mL NS (MBP), 1 g, Intravenous, Q12H, Reyna Yao MD  •  enoxaparin (LOVENOX) syringe 30 mg, 30 mg, Subcutaneous, Q24H, Cata Culp APRN, 30 mg at 10/07/19  1717  •  fluticasone (FLONASE) 50 MCG/ACT nasal spray 2 spray, 2 spray, Nasal, Daily, Reyna Yao MD  •  HYDROcodone-acetaminophen (NORCO) 7.5-325 MG per tablet 1 tablet, 1 tablet, Oral, Q6H PRN, Reyna Yao MD, 1 tablet at 10/07/19 1057  •  levothyroxine (SYNTHROID, LEVOTHROID) tablet 137 mcg, 137 mcg, Oral, Q AM, Reyna Yao MD, 137 mcg at 10/08/19 0539  •  magnesium oxide (MAGOX) tablet 400 mg, 400 mg, Oral, Daily, Reyna Yao MD, 400 mg at 10/08/19 0938  •  ondansetron (ZOFRAN) tablet 4 mg, 4 mg, Oral, Q6H PRN **OR** ondansetron (ZOFRAN) injection 4 mg, 4 mg, Intravenous, Q6H PRN, Carmen Hollins, APRN  •  pantoprazole (PROTONIX) EC tablet 40 mg, 40 mg, Oral, Q AM, Reyna Yao MD, 40 mg at 10/08/19 0539  •  Pharmacy to dose vancomycin, , Does not apply, Continuous PRN, Reyna Yao MD  •  pregabalin (LYRICA) capsule 50 mg, 50 mg, Oral, Q12H, Reyna Yao MD, 50 mg at 10/08/19 0937  •  rOPINIRole (REQUIP) tablet 3 mg, 3 mg, Oral, Nightly, Reyna Yao MD, 3 mg at 10/07/19 2132  •  simethicone (MYLICON) chewable tablet 80 mg, 80 mg, Oral, Q6H PRN, Reyna Yao MD  •  sodium chloride 0.9 % flush 10 mL, 10 mL, Intravenous, PRN, Luis Whaley MD  •  sodium chloride 0.9 % flush 10 mL, 10 mL, Intravenous, Q12H, Carmen Hollins, APRN, 10 mL at 10/07/19 2132  •  sodium chloride 0.9 % flush 10 mL, 10 mL, Intravenous, PRN, Carmen Hollins APRN  •  sodium chloride 0.9 % infusion, 75 mL/hr, Intravenous, Continuous, Cata Culp APRN, Stopped at 10/07/19 2234  •  vancomycin 1000 mg/250 mL 0.9% NS IVPB ([facility]), 1,000 mg, Intravenous, PRN, Reyna Yao MD  •  vancomycin 1250 mg/250 mL 0.9% NS IVPB ([facility]), 1,250 mg, Intravenous, Once, Reyna Yao MD  •  vitamin B-12 (CYANOCOBALAMIN) tablet 1,000 mcg, 1,000 mcg, Oral, Daily, Reyna Yao MD, 1,000 mcg at 10/08/19 0944    Results Review:  Lab  Results (last 48 hours)     Procedure Component Value Units Date/Time    Basic Metabolic Panel [[id number]]  (Abnormal) Collected:  10/08/19 0353    Specimen:  Blood Updated:  10/08/19 0522     Glucose 106 mg/dL      BUN 23 mg/dL      Creatinine 1.20 mg/dL      Sodium 141 mmol/L      Potassium 3.5 mmol/L      Chloride 110 mmol/L      CO2 25.0 mmol/L      Calcium 8.3 mg/dL      eGFR Non African Amer 43 mL/min/1.73      BUN/Creatinine Ratio 19.2     Anion Gap 9.5 mmol/L     Narrative:       The MDRD GFR formula is only valid for adults with stable renal function between ages 18 and 70.    Uric Acid [[id number]]  (Normal) Collected:  10/08/19 0353    Specimen:  Blood Updated:  10/08/19 0520     Uric Acid 5.0 mg/dL     Vancomycin, Random [[id number]]  (Normal) Collected:  10/08/19 0353    Specimen:  Blood Updated:  10/08/19 0519     Vancomycin Random 7.30 mcg/mL     Blood Culture - Blood, Wrist, Left [[id number]] Collected:  10/07/19 0418    Specimen:  Blood from Wrist, Left Updated:  10/08/19 0515     Blood Culture No growth at 24 hours    CBC Auto Differential [[id number]]  (Abnormal) Collected:  10/08/19 0353    Specimen:  Blood Updated:  10/08/19 0504     WBC 3.90 10*3/mm3      Comment: Result checked         RBC 2.57 10*6/mm3      Hemoglobin 8.4 g/dL      Hematocrit 24.5 %      MCV 95.3 fL      MCH 32.7 pg      MCHC 34.3 g/dL      RDW 16.5 %      RDW-SD 55.6 fl      MPV 7.5 fL      Platelets 111 10*3/mm3      Neutrophil % 74.8 %      Lymphocyte % 15.1 %      Monocyte % 9.0 %      Eosinophil % 0.9 %      Basophil % 0.2 %      Neutrophils, Absolute 2.90 10*3/mm3      Lymphocytes, Absolute 0.60 10*3/mm3      Monocytes, Absolute 0.40 10*3/mm3      Eosinophils, Absolute 0.00 10*3/mm3      Basophils, Absolute 0.00 10*3/mm3      nRBC 0.2 /100 WBC     Blood Culture - Blood, Arm, Left [[id number]] Collected:  10/07/19 0409    Specimen:  Blood from Arm, Left Updated:  10/08/19 0415     Blood Culture No growth at 24 hours     Respiratory Panel, PCR - Swab, Nasopharynx [[id number]]  (Normal) Collected:  10/07/19 1119    Specimen:  Swab from Nasopharynx Updated:  10/07/19 1319     ADENOVIRUS, PCR Not Detected     Coronavirus 229E Not Detected     Coronavirus HKU1 Not Detected     Coronavirus NL63 Not Detected     Coronavirus OC43 Not Detected     Human Metapneumovirus Not Detected     Human Rhinovirus/Enterovirus Not Detected     Influenza B PCR Not Detected     Parainfluenza Virus 1 Not Detected     Parainfluenza Virus 2 Not Detected     Parainfluenza Virus 3 Not Detected     Parainfluenza Virus 4 Not Detected     Bordetella pertussis pcr Not Detected     Influenza A H1 2009 PCR Not Detected     Chlamydophila pneumoniae PCR Not Detected     Mycoplasma pneumo by PCR Not Detected     Influenza A PCR Not Detected     Influenza A H3 Not Detected     Influenza A H1 Not Detected     RSV, PCR Not Detected    BNP [[id number]]  (Normal) Collected:  10/07/19 0909    Specimen:  Blood Updated:  10/07/19 1018     BNP 80.0 pg/mL      Comment: Results may be falsely decreased if patient taking Biotin.       Lactic Acid, Plasma [[id number]]  (Normal) Collected:  10/07/19 0910    Specimen:  Blood Updated:  10/07/19 1007     Lactate 1.1 mmol/L     Urinalysis With Culture If Indicated - Urine, Catheter [[id number]]  (Abnormal) Collected:  10/07/19 0534    Specimen:  Urine, Catheter Updated:  10/07/19 0557     Color, UA Dark Yellow     Appearance, UA Clear     pH, UA 5.5     Specific Gravity, UA 1.022     Glucose, UA Negative     Ketones, UA Negative     Bilirubin, UA Negative     Blood, UA Small (1+)     Protein, UA Trace     Leuk Esterase, UA Trace     Nitrite, UA Negative     Urobilinogen, UA 1.0 E.U./dL    Urinalysis, Microscopic Only - Urine, Catheter [[id number]]  (Abnormal) Collected:  10/07/19 0534    Specimen:  Urine, Catheter Updated:  10/07/19 0557     RBC, UA 0-2 /HPF      WBC, UA 3-5 /HPF      Bacteria, UA Trace /HPF      Squamous Epithelial Cells,  UA 7-12 /HPF      Hyaline Casts, UA 3-6 /LPF      Methodology Automated Microscopy    [place] Draw [[id number]] Collected:  10/07/19 0418    Specimen:  Blood Updated:  10/07/19 0530    Narrative:       The following orders were created for panel order [place] Draw.  Procedure                               Abnormality         Status                     ---------                               -----------         ------                     Light Blue Top[[id number]]                                   Final result               Green Top (Gel)[[id number]]                                  Final result               Lavender Top[[id number]]                                     Final result               Gold Top - SST[[id number]]                                   Final result                 Please view results for these tests on the individual orders.    Light Blue Top [[id number]] Collected:  10/07/19 0418    Specimen:  Blood Updated:  10/07/19 0530     Extra Tube hold for add-on     Comment: Auto resulted       Green Top (Gel) [[id number]] Collected:  10/07/19 0418    Specimen:  Blood Updated:  10/07/19 0530     Extra Tube Hold for add-ons.     Comment: Auto resulted.       Lavender Top [[id number]] Collected:  10/07/19 0418    Specimen:  Blood Updated:  10/07/19 0530     Extra Tube hold for add-on     Comment: Auto resulted       Gold Top - SST [[id number]] Collected:  10/07/19 0418    Specimen:  Blood Updated:  10/07/19 0530     Extra Tube Hold for add-ons.     Comment: Auto resulted.       Comprehensive Metabolic Panel [[id number]]  (Abnormal) Collected:  10/07/19 0418    Specimen:  Blood Updated:  10/07/19 0457     Glucose 134 mg/dL      BUN 29 mg/dL      Creatinine 1.50 mg/dL      Sodium 142 mmol/L      Potassium 4.9 mmol/L      Comment: Specimen hemolyzed.  Results may be affected.        Chloride 106 mmol/L      CO2 26.0 mmol/L      Calcium 8.7 mg/dL      Total Protein 6.7 g/dL      Albumin 3.40 g/dL      ALT (SGPT) 6 U/L       Comment: Specimen hemolyzed.  Results may be affected.        AST (SGOT) 33 U/L      Comment: Specimen hemolyzed.  Results may be affected.        Alkaline Phosphatase 58 U/L      Total Bilirubin 0.7 mg/dL      Comment: Specimen hemolyzed.  Results may be affected.        eGFR Non African Amer 33 mL/min/1.73      Globulin 3.3 gm/dL      A/G Ratio 1.0 g/dL      BUN/Creatinine Ratio 19.3     Anion Gap 14.9 mmol/L     Narrative:       The MDRD GFR formula is only valid for adults with stable renal function between ages 18 and 70.    CBC & Differential [[id number]] Collected:  10/07/19 0418    Specimen:  Blood Updated:  10/07/19 0429    Narrative:       The following orders were created for panel order CBC & Differential.  Procedure                               Abnormality         Status                     ---------                               -----------         ------                     CBC Auto Differential[[id number]]        Abnormal            Final result                 Please view results for these tests on the individual orders.    CBC Auto Differential [[id number]]  (Abnormal) Collected:  10/07/19 0418    Specimen:  Blood Updated:  10/07/19 0429     WBC 9.90 10*3/mm3      RBC 2.90 10*6/mm3      Hemoglobin 9.5 g/dL      Hematocrit 27.5 %      MCV 94.7 fL      MCH 32.6 pg      MCHC 34.4 g/dL      RDW 16.2 %      RDW-SD 54.7 fl      MPV 7.9 fL      Platelets 177 10*3/mm3      Neutrophil % 90.2 %      Lymphocyte % 4.1 %      Monocyte % 5.4 %      Eosinophil % 0.1 %      Basophil % 0.2 %      Neutrophils, Absolute 8.90 10*3/mm3      Lymphocytes, Absolute 0.40 10*3/mm3      Monocytes, Absolute 0.50 10*3/mm3      Eosinophils, Absolute 0.00 10*3/mm3      Basophils, Absolute 0.00 10*3/mm3      nRBC 0.0 /100 WBC     POC Lactate [[id number]]  (Normal) Collected:  10/07/19 0426    Specimen:  Blood Updated:  10/07/19 0427     Lactate 1.8 mmol/L      Comment: Serial Number: [id number]:  [id number]            Imaging Results (last 72 hours)     ** No results found for the last 72 hours. **          Review of Systems:  Review of Systems   Respiratory: Negative for cough.    Cardiovascular: Positive for leg swelling. Negative for chest pain.   Skin: Positive for wound.   Psychiatric/Behavioral:        Very poor historian       Physical Assessment:                            Right lower extremity venous ulcer with cellulitis: The right shin presents with partial-thickness superficial areas open there are several of these they are crusting over scant serous exudate noted.  Clustering the wounds approximate size is about 4 x 5 cm.  Palpable pedal pulses to both lower extremities.  She does have stasis staining to bilateral lower extremities.  At this time there is no erythema nor warmth or odor noted.    Recommendation and Plan  Right lower extremity venous ulcers, I would recommend keeping the area covered with a silicone foam border and use an Ace wrap for light compression.  Patient refuses to follow-up with outpatient wound center and prefers to be seen by her home health agency.    DENISHA Fields   10/8/2019   9:55 AM

## 2019-10-08 NOTE — PLAN OF CARE
Problem: Patient Care Overview  Goal: Plan of Care Review   10/08/19 0435   Coping/Psychosocial   Plan of Care Reviewed With patient   OTHER   Outcome Summary Patient very drowsy throughout the shift. She does wake and is alert during care but quickly goes back to sleep.

## 2019-10-08 NOTE — PROGRESS NOTES
Continued Stay Note  [facility]     Patient Name: Lorenza Owens  MRN: [id number]  Today's Date: 10/8/2019    Admit Date: 10/7/2019    Discharge Plan     Row Name 10/08/19 1415       Plan    Plan  [facility]ate [facility].     Patient/Family in Agreement with Plan  yes    Plan Comments  Per ALEX Wilson, wound consult resulted in new recommendations for Mepilex dressings and ace bandages and [facility] unable to assist with this care. Outgoing call to [facility] who can increase services from once weekly to 3 visits during the first week and 2 visits per week after that, while needed for current wound care with education of patient and daughter to assist with dressings on days when HH will not be seeing patient. Informed patient of plan, she is agreeable. Order sent to [facility] in [facility].     Row Name 10/08/19 1211       Plan    Plan  Anticipate return to [facility] with [facility].     Patient/Family in Agreement with Plan  yes    Plan Comments  Spoke with patient and daughter during multidisciplinary rounds, verified that they are both agreeable for patient to return [facility].         Jaqueline Mir  [phone]

## 2019-10-09 ENCOUNTER — READMISSION MANAGEMENT (OUTPATIENT)
Dept: CALL CENTER | Facility: HOSPITAL | Age: 84
End: 2019-10-09

## 2019-10-09 LAB
BACTERIA SPEC AEROBE CULT: ABNORMAL
BACTERIA SPEC AEROBE CULT: ABNORMAL
GRAM STN SPEC: ABNORMAL
GRAM STN SPEC: ABNORMAL

## 2019-10-09 NOTE — OUTREACH NOTE
Prep Survey      Responses   Facility patient discharged from?  [facility]   Is patient eligible?  Yes   Discharge diagnosis  Cellulitis, UTI   Does the patient have one of the following disease processes/diagnoses(primary or secondary)?  Other   Does the patient have Home health ordered?  Yes   What is the Home health agency?   Lives in [facility]   Is there a DME ordered?  No   Prep survey completed?  Yes          Mireya Ly RN

## 2019-10-09 NOTE — PROGRESS NOTES
Continued Stay Note  [facility]     Patient Name: Lorenza Owens  MRN: [id number]  Today's Date: 10/9/2019    Admit Date: 10/7/2019    Discharge Plan     Row Name 10/09/19 1643       Plan    Final Discharge Disposition Code  06 - home with home health care        Discharge Codes    No documentation.       Expected Discharge Date and Time     Expected Discharge Date Expected Discharge Time    Oct 8, 2019             Kaylee Zavala RN

## 2019-10-10 ENCOUNTER — READMISSION MANAGEMENT (OUTPATIENT)
Dept: CALL CENTER | Facility: HOSPITAL | Age: 84
End: 2019-10-10

## 2019-10-10 NOTE — DISCHARGE SUMMARY
[facility]  DISCHARGE SUMMARY        Prepared For PCP:  Leobardo Kim, DENISHA        Date of Admission:   10/7/2019    Date of Discharge:  10/10/2019    Length of stay:  LOS: 0 days     Reason For Admission:  Acute kidney injury.  Excessive sleepiness likely from polypharmacy improved.   Lower extremity cellulitis               Principal and Active Diagnosis During Hospital Stay:  Acute kidney injury.  Excessive sleepiness likely from polypharmacy improved.   Lower extremity cellulitis  Abnormal urinalysis  Normocytic anemia  Diastolic heart failure with preserved EF  Chronic Asthma, no acute exacerbation  Chronic pain syndrome with chronic immobility  Parkinson's Disease  Chronic Gout  Hyperlipidemia  GERD  Hypothyroidism          Hospital Course:    84-year-old female came in after she noted increasing redness and swelling involving both legs especially left, patient recognized to have lower extremity cellulitis, treated with IV antibiotics cefepime and vancomycin in house, patient redness and swelling did improve significantly, antibiotic changed to p.o. doxycycline on discharge for few more days.  Patient also found to have acute kidney injury with creatinine of 1.5, received hydration with improvement in renal function test to 1.2.  Patient also recognized to have somewhat increased sleepiness in the beginning, it improved on its own, there was a concern of polypharmacy, but realizing patient is mentating now on his regular home regime, patient advised to follow with the family physician and if possible if he can be tapered off from his multiple medications if possible.          Recommendation for Outpatient Providers:             Reasons For Change In Medications and Indications for New Medications:          Discharge Disposition      [place] [facility] [place]       Discharge Medications      New Medications      Instructions Start Date   doxycycline 100 MG enteric  coated tablet  Commonly known as:  DORYX   100 mg, Oral, 2 Times Daily         Changes to Medications      Instructions Start Date   furosemide 40 MG tablet  Commonly known as:  LASIX  What changed:  how much to take   20 mg, Oral, Daily      potassium chloride 20 MEQ CR tablet  Commonly known as:  K-DUR,KLOR-ARCADIO  What changed:    · how much to take  · when to take this   10 mEq, Oral, Daily         Continue These Medications      Instructions Start Date   allopurinol 100 MG tablet  Commonly known as:  ZYLOPRIM   100 mg, Oral, Daily      baclofen 10 MG tablet  Commonly known as:  LIORESAL   5 mg, Oral, 3 Times Daily      cetirizine 10 MG tablet  Commonly known as:  zyrTEC   10 mg, Oral, Daily      esomeprazole 40 MG packet  Commonly known as:  NexIUM   NEXIUM PACK      fenofibrate 145 MG tablet  Commonly known as:  TRICOR   145 mg, Oral, Daily      ferrous gluconate 324 MG tablet  Commonly known as:  FERGON   FERROUS GLUCONATE 324 (38 Fe) MG TABS      fluticasone 50 MCG/ACT nasal spray  Commonly known as:  FLONASE   2 sprays, Nasal, Daily      HYDROcodone-acetaminophen 7.5-325 MG per tablet  Commonly known as:  NORCO   1 tablet, Oral, Every 6 Hours PRN      levothyroxine 137 MCG tablet  Commonly known as:  SYNTHROID, LEVOTHROID   137 mcg, Oral, Daily      lubiprostone 24 MCG capsule  Commonly known as:  AMITIZA   24 mcg, Oral, 2 Times Daily With Meals      magnesium oxide 400 (241.3 Mg) MG tablet tablet  Commonly known as:  MAGOX   400 mg, Oral, Daily      melatonin 5 MG tablet tablet   5 mg, Oral      mupirocin 2 % cream  Commonly known as:  BACTROBAN   Topical, 3 Times Daily      polyethylene glycol packet  Commonly known as:  MIRALAX   17 g, Oral, Daily      pregabalin 50 MG capsule  Commonly known as:  LYRICA   50 mg, Oral, 2 Times Daily      raNITIdine 75 MG tablet  Commonly known as:  ZANTAC   75 mg, Oral, 2 Times Daily      rOPINIRole 4 MG tablet  Commonly known as:  REQUIP   3 mg, Oral, Nightly      senna  8.6 MG tablet tablet  Commonly known as:  SENOKOT   Oral, Nightly      simethicone 80 MG chewable tablet  Commonly known as:  MYLICON   80 mg, Oral, Every 6 Hours PRN      SINEMET  MG per tablet  Generic drug:  carbidopa-levodopa   SINEMET  MG TABS      vitamin B-12 1000 MCG tablet  Commonly known as:  CYANOCOBALAMIN   1,000 mcg, Oral, Daily      vitamin C 500 MG tablet  Commonly known as:  ASCORBIC ACID   500 mg, Oral, Daily      vitamin D 49564 units capsule capsule  Commonly known as:  ERGOCALCIFEROL   50,000 Units, Oral, Weekly         Stop These Medications    CYMBALTA 60 MG capsule  Generic drug:  DULoxetine                  Test Results Pending at Discharge   Order Current Status    Blood Culture - Blood, Arm, Left Preliminary result    Blood Culture - Blood, Wrist, Left Preliminary result                Procedures Performed         Vital Signs         Physical Exam:    Physical Exam   Constitutional: She is oriented to person, place, and time. She appears well-developed and well-nourished. No distress.   HENT:   Head: Normocephalic and atraumatic.   Right Ear: External ear normal.   Left Ear: External ear normal.   Nose: Nose normal.   Mouth/Throat: Oropharynx is clear and moist. No oropharyngeal exudate.   Eyes: Conjunctivae and EOM are normal. Pupils are equal, round, and reactive to light. Right eye exhibits no discharge. Left eye exhibits no discharge. No scleral icterus.   Neck: Normal range of motion. No JVD present. No tracheal deviation present. No thyromegaly present.   Cardiovascular: Normal rate, regular rhythm, normal heart sounds and intact distal pulses. Exam reveals no gallop and no friction rub.   No murmur heard.  Pulmonary/Chest: Effort normal and breath sounds normal. No stridor. No respiratory distress. She has no wheezes. She has no rales. She exhibits no tenderness.   Abdominal: Soft. Bowel sounds are normal. She exhibits no distension and no mass. There is no tenderness.  There is no rebound and no guarding. No hernia.   Musculoskeletal: Normal range of motion. She exhibits no edema, tenderness or deformity.   Lymphadenopathy:     She has no cervical adenopathy.   Neurological: She is alert and oriented to person, place, and time. No cranial nerve deficit or sensory deficit. She exhibits normal muscle tone. Coordination normal.   Skin: Skin is warm and dry. No rash noted. She is not diaphoretic. No erythema.   Psychiatric: She has a normal mood and affect. Her behavior is normal.   Nursing note and vitals reviewed.        Consults:   Consults     No orders found from 9/8/2019 to 10/8/2019.          Pertinent Test Results:     Results from last 7 days   Lab Units 10/08/19  0353 10/07/19  0418   WBC 10*3/mm3 3.90 9.90   HEMOGLOBIN g/dL 8.4* 9.5*   HEMATOCRIT % 24.5* 27.5*   PLATELETS 10*3/mm3 111* 177     Results from last 7 days   Lab Units 10/08/19  0353 10/07/19  0418   SODIUM mmol/L 141 142   POTASSIUM mmol/L 3.5* 4.9   CHLORIDE mmol/L 110 106   CO2 mmol/L 25.0 26.0   BUN mg/dL 23* 29*   CREATININE mg/dL 1.20* 1.50*   GLUCOSE mg/dL 106* 134*   CALCIUM mg/dL 8.3* 8.7*     Results from last 7 days   Lab Units 10/08/19  0353 10/07/19  0418   SODIUM mmol/L 141 142   POTASSIUM mmol/L 3.5* 4.9   CHLORIDE mmol/L 110 106   CO2 mmol/L 25.0 26.0   BUN mg/dL 23* 29*   CREATININE mg/dL 1.20* 1.50*   CALCIUM mg/dL 8.3* 8.7*   BILIRUBIN mg/dL  --  0.7   ALK PHOS U/L  --  58   ALT (SGPT) U/L  --  6*   AST (SGOT) U/L  --  33   GLUCOSE mg/dL 106* 134*         Lab Results   Component Value Date    CALCIUM 8.3 (L) 10/08/2019     No results found for: HGBA1C          Microbiology Results (last 10 days)     Procedure Component Value - Date/Time    Respiratory Panel, PCR - Swab, Nasopharynx [[id number]]  (Normal) Collected:  10/07/19 1119    Lab Status:  Final result Specimen:  Swab from Nasopharynx Updated:  10/07/19 1319     ADENOVIRUS, PCR Not Detected     Coronavirus 229E Not Detected     Coronavirus  HKU1 Not Detected     Coronavirus NL63 Not Detected     Coronavirus OC43 Not Detected     Human Metapneumovirus Not Detected     Human Rhinovirus/Enterovirus Not Detected     Influenza B PCR Not Detected     Parainfluenza Virus 1 Not Detected     Parainfluenza Virus 2 Not Detected     Parainfluenza Virus 3 Not Detected     Parainfluenza Virus 4 Not Detected     Bordetella pertussis pcr Not Detected     Influenza A H1 2009 PCR Not Detected     Chlamydophila pneumoniae PCR Not Detected     Mycoplasma pneumo by PCR Not Detected     Influenza A PCR Not Detected     Influenza A H3 Not Detected     Influenza A H1 Not Detected     RSV, PCR Not Detected    Blood Culture - Blood, Wrist, Left [[id number]] Collected:  10/07/19 0418    Lab Status:  Preliminary result Specimen:  Blood from Wrist, Left Updated:  10/10/19 0515     Blood Culture No growth at 3 days    Blood Culture - Blood, Arm, Left [[id number]] Collected:  10/07/19 0409    Lab Status:  Preliminary result Specimen:  Blood from Arm, Left Updated:  10/10/19 0415     Blood Culture No growth at 3 days    Wound Culture - Wound, Leg, Right [[id number]]  (Abnormal)  (Susceptibility) Collected:  10/06/19 1430    Lab Status:  Final result Specimen:  Wound from Leg, Right Updated:  10/09/19 0713     Wound Culture Heavy growth (4+) Pseudomonas aeruginosa      Moderate growth (3+) Providencia rettgeri     Gram Stain Many (4+) Epithelial cells per low power field      Many (4+) Gram negative bacilli    Susceptibility      Pseudomonas aeruginosa     RENA     Cefepime Susceptible     Ceftazidime Susceptible     Ciprofloxacin Susceptible     Gentamicin Susceptible     Levofloxacin Susceptible     Piperacillin + Tazobactam Susceptible                Susceptibility      Providencia rettgeri     RENA     Ampicillin Resistant     Ampicillin + Sulbactam Susceptible     Cefazolin Resistant     Cefepime Susceptible     Ceftazidime Susceptible     Ceftriaxone Susceptible     Gentamicin  Susceptible     Levofloxacin Susceptible     Piperacillin + Tazobactam Susceptible     Tetracycline Resistant     Trimethoprim + Sulfamethoxazole Susceptible                          ECG/EMG Results (most recent)     None          Results for orders placed during the hospital encounter of 18   Duplex Venous Lower Extremity - Bilateral CAR    Narrative                   Lower Extremity Venous Report                          [facility]                         Vascular Laboratory                            [address] [place] [address]    Name: LEVI LEE                  Study Date: 08/15/2018 10:28 AM  MRN: [id number]                       Patient Location: 3A  : 1935 (M/d/yyyy)           Gender: Female  Age: 83 yrs                          Account#: [id number]  Reason For Study: elevated d-dimer      Procedure  Venous study was carried out in bilateral lower extremities. Gray scale, color  flow, and spectral doppler images were obtained. Images were obtained in  transverse and longitudinal views. Technically satisfactory study.    Right Lower Extremity Venous  On the right side the patient has patent common femoral, femoral, and  popliteal veins. The main veins are easily compressible. Femoral vein was  mapped in its entirety through the course of the thigh. It is patent and  compresses well. The popliteal vein is patent along with its tributaries and  compresses well with no evidence of any filling defect. Augmentation test is  positive. Respiratory waves are biphasic. Right posterior tibial vein is  patent. Right peroneal vein not visualized. The right greater and small  saphenous veins are patent with good compressibility.    Left Lower Extremity Venous  On the left side the patient has patent common femoral, femoral, and popliteal  veins. The main veins compress well. Femoral vein was mapped in its entirety  through the course of the thigh. It is patent and  compresses well. The  popliteal vein is patent along with its tributaries and compresses well with  no evidence of any filling defect. Augmentation test is positive. Respiratory  waves are biphasic. Left posterior tibial vein is patent. Left peroneal vein  not visualized. The left greater and small saphenous veins are patent with  good compressibility.      Interpretation Summary  No evidence of any deep vein thrombosis or superficial vein thrombosis.  _______________________________________________________________________________    Electronically signed by: MD Violet Robles  on 08/15/2018 04:43 PM    Ordering Physician: FRANCISCO IBARRA  Referring Physician: LAKESHA BESS  Performed By: OSKAR         Results for orders placed during the hospital encounter of 18   Adult Transthoracic Echo Complete W/ Cont if Necessary Per Protocol    Narrative                           Adult Echocardiogram Report          [facility]  Cardiology Department  [address] [place] [address]  [id number]      Name: LEVI LEE       Study Date: 08/15/2018 09:47 AM      BP: 168/79 mmHg  MRN: [id number]            Patient Location:   : 1935           Gender: Female                       Height: 60 in  Age: 83 yrs               Account#: [id number]                Weight: 197 lb  Reason For Study: SOA/WEAKNESS                                 BSA: 1.9 m2  Ordering Physician: FRANCISCO IBARRA  Referring Physician:  LAKESHA BESS  Performed By: AM      M-Mode/2-D Measurements:  LVIDd: 4.7 cm       (3.7-5.7) LVPWd: 1.1 cm        (0.8-1.2)  LVIDs: 3.1 cm       (2.3-3.9)  ACS: 1.8 cm         (1.6-3.7) IVSd: 0.93 cm        (0.7-1.2)  LA dimension: 3.7 cm(1.9-4.0) RVDd: 2.6 cm         (0.7-2.4)  FS: 34.6 %          (21-40%)  Ao root diam: 3.0 cm (2.0-3.7)    Procedure  A 2D, M-mode, color flow and doppler echocardiogram was performed.    Comments  The left ventricle is normal in size. There is normal left ventricular wall  thickness.  Left ventricular systolic function is normal. Ejection Fraction is  55-60%. The transmitral spectral Doppler flow pattern is suggestive of  impaired LV relaxation. No regional wall motion abnormalities noted. The right  ventricle is grossly normal size. Right ventricular function cannot be  assessed due to poor image quality. The left atrial size is normal. The right  atrium is mild to moderately dilated. The mitral valve is grossly normal in  structure. There is trace mitral regurgitation. The tricuspid valve is not  well visualized. Mild aortic valve calcification. Mild valvular aortic  stenosis. Peak/mean ACV gradients are 16/10 mm Hg respectively. Calculated HARLEY  is 1.9 sq cm. No aortic regurgitation is present. The pulmonic valve is not  well visualized. The aortic root is normal size. The pulmonary is not well  visualized. There is no pericardial effusion.      Interpretation  Left ventricular systolic function is normal.  Ejection Fraction is 55-60%  The transmitral spectral Doppler flow pattern is suggestive of impaired LV  relaxation.  The right atrium is mild to moderately dilated.  There is trace mitral regurgitation.  Mild aortic valve calcification.  Mild valvular aortic stenosis.  Peak/mean ACV gradients are 16/10 mm Hg respectively. Calculated HARLEY is 1.9 sq  cm.  The study was technically difficult.      MMode/2D Measurements & Calculations  ESV(Teich): 37.5 ml                  Ao root area: 7.2 cm2  EF(Teich): 63.7 %  LVOT diam: 2.0 cm                    EDV(MOD-sp4): 60.5 ml                                       ESV(MOD-sp4): 27.0 ml                                       EF(MOD-sp4): 55.3 %    Doppler Measurements & Calculations  MV E max gretel: 56.1 cm/sec                MV max P.3 mmHg  MV A max gretel: 151.5 cm/sec               MV mean P.6 mmHg  MV E/A: 0.37  MV dec slope: 567.6 cm/sec2              Ao V2 max: 200.6 cm/sec  MV dec time: 0.20 sec                    Ao max P.1 mmHg                                            Ao V2 mean: 156.1 cm/sec                                           Ao mean PG: 10.2 mmHg                                           Ao V2 VTI: 48.3 cm                                           HARLEY(I,D): 1.9 cm2                                             HARLEY(V,D): 2.1 cm2  LV V1 max P.1 mmHg                   PA max P.5 mmHg  LV V1 mean PG: 3.4 mmHg  LV V1 max: 133.7 cm/sec  LV V1 mean: 84.6 cm/sec  LV V1 VTI: 29.3 cm    _______________________________________________________________________________      Electronically signed by: MD Vivek Mcdowell  on 2018 07:43 AM         No radiology results for the last 7 days        Condition on Discharge:      Stable    Discharge Diet:   Diet Instructions     Diet: Cardiac, Regular      Discharge Diet:   Cardiac  Regular             Activity at Discharge:     Follow-up Appointments  No future appointments.  Additional Instructions for the Follow-ups that You Need to Schedule     Discharge Follow-up with PCP   As directed       Currently Documented PCP:    Leobardo Kim APRN    PCP Phone Number:    [phone]     Follow Up Details:  one week time.         Discharge Follow-up with Specified Provider: Family physician in a week time.; 1 Week   As directed      To:  Family physician in a week time.    Follow Up:  1 Week                 Reyna Yao MD  10/10/19  8:42 AM    Time: I spent  33  minutes on this discharge activity which included face-to-face encounter with the patient/reviewing the data in the system/coordination of the care with the nursing staff as well as consultants/documentation/entering orders.

## 2019-10-12 LAB
BACTERIA SPEC AEROBE CULT: NORMAL
BACTERIA SPEC AEROBE CULT: NORMAL

## 2020-02-13 ENCOUNTER — OFFICE VISIT (OUTPATIENT)
Dept: WOUND CARE | Facility: HOSPITAL | Age: 85
End: 2020-02-13

## 2020-02-13 PROCEDURE — G0463 HOSPITAL OUTPT CLINIC VISIT: HCPCS

## 2020-02-17 ENCOUNTER — APPOINTMENT (OUTPATIENT)
Dept: WOUND CARE | Facility: HOSPITAL | Age: 85
End: 2020-02-17

## 2020-03-05 ENCOUNTER — OFFICE VISIT (OUTPATIENT)
Dept: WOUND CARE | Facility: HOSPITAL | Age: 85
End: 2020-03-05

## 2020-03-26 ENCOUNTER — APPOINTMENT (OUTPATIENT)
Dept: WOUND CARE | Facility: HOSPITAL | Age: 85
End: 2020-03-26

## 2020-06-04 ENCOUNTER — OFFICE VISIT (OUTPATIENT)
Dept: WOUND CARE | Facility: HOSPITAL | Age: 85
End: 2020-06-04

## 2020-06-18 ENCOUNTER — OFFICE VISIT (OUTPATIENT)
Dept: WOUND CARE | Facility: HOSPITAL | Age: 85
End: 2020-06-18

## 2020-07-02 ENCOUNTER — OFFICE VISIT (OUTPATIENT)
Dept: WOUND CARE | Facility: HOSPITAL | Age: 85
End: 2020-07-02

## 2020-07-02 PROCEDURE — 99212 OFFICE O/P EST SF 10 MIN: CPT | Performed by: SURGERY

## 2020-07-16 ENCOUNTER — OFFICE VISIT (OUTPATIENT)
Dept: WOUND CARE | Facility: HOSPITAL | Age: 85
End: 2020-07-16

## 2020-07-16 PROCEDURE — 99212 OFFICE O/P EST SF 10 MIN: CPT | Performed by: SURGERY

## 2020-07-30 ENCOUNTER — OFFICE VISIT (OUTPATIENT)
Dept: WOUND CARE | Facility: HOSPITAL | Age: 85
End: 2020-07-30

## 2020-07-30 PROCEDURE — 99213 OFFICE O/P EST LOW 20 MIN: CPT | Performed by: SURGERY

## 2020-08-05 ENCOUNTER — LAB REQUISITION (OUTPATIENT)
Dept: LAB | Facility: HOSPITAL | Age: 85
End: 2020-08-05

## 2020-08-05 DIAGNOSIS — F02.80 DEMENTIA IN OTHER DISEASES CLASSIFIED ELSEWHERE WITHOUT BEHAVIORAL DISTURBANCE: ICD-10-CM

## 2020-08-05 DIAGNOSIS — I87.2 VENOUS INSUFFICIENCY (CHRONIC) (PERIPHERAL): ICD-10-CM

## 2020-08-05 DIAGNOSIS — I09.81: ICD-10-CM

## 2020-08-05 LAB
BILIRUB UR QL STRIP: NEGATIVE
CLARITY UR: CLEAR
COLOR UR: YELLOW
GLUCOSE UR STRIP-MCNC: NEGATIVE MG/DL
HGB UR QL STRIP.AUTO: NEGATIVE
KETONES UR QL STRIP: NEGATIVE
LEUKOCYTE ESTERASE UR QL STRIP.AUTO: NEGATIVE
NITRITE UR QL STRIP: NEGATIVE
PH UR STRIP.AUTO: 5.5 [PH] (ref 5–8)
PROT UR QL STRIP: NEGATIVE
SP GR UR STRIP: 1.02 (ref 1–1.03)
UROBILINOGEN UR QL STRIP: NORMAL

## 2020-08-05 PROCEDURE — 81003 URINALYSIS AUTO W/O SCOPE: CPT | Performed by: INTERNAL MEDICINE

## 2020-08-06 ENCOUNTER — OFFICE VISIT (OUTPATIENT)
Dept: WOUND CARE | Facility: HOSPITAL | Age: 85
End: 2020-08-06

## 2020-08-06 PROCEDURE — 11042 DBRDMT SUBQ TIS 1ST 20SQCM/<: CPT | Performed by: SURGERY

## 2020-08-13 ENCOUNTER — OFFICE VISIT (OUTPATIENT)
Dept: WOUND CARE | Facility: HOSPITAL | Age: 85
End: 2020-08-13

## 2020-08-13 PROCEDURE — 99212 OFFICE O/P EST SF 10 MIN: CPT | Performed by: SURGERY

## 2020-08-20 ENCOUNTER — OFFICE VISIT (OUTPATIENT)
Dept: WOUND CARE | Facility: HOSPITAL | Age: 85
End: 2020-08-20

## 2020-08-20 PROCEDURE — 99213 OFFICE O/P EST LOW 20 MIN: CPT | Performed by: PHYSICIAN ASSISTANT

## 2020-08-20 PROCEDURE — 17250 CHEM CAUT OF GRANLTJ TISSUE: CPT

## 2020-08-20 PROCEDURE — 17250 CHEM CAUT OF GRANLTJ TISSUE: CPT | Performed by: PHYSICIAN ASSISTANT

## 2020-08-27 ENCOUNTER — OFFICE VISIT (OUTPATIENT)
Dept: WOUND CARE | Facility: HOSPITAL | Age: 85
End: 2020-08-27

## 2020-08-27 PROCEDURE — 99213 OFFICE O/P EST LOW 20 MIN: CPT | Performed by: SURGERY

## 2020-09-03 ENCOUNTER — OFFICE VISIT (OUTPATIENT)
Dept: WOUND CARE | Facility: HOSPITAL | Age: 85
End: 2020-09-03

## 2020-09-03 PROCEDURE — 99213 OFFICE O/P EST LOW 20 MIN: CPT | Performed by: SURGERY

## 2020-09-10 ENCOUNTER — OFFICE VISIT (OUTPATIENT)
Dept: WOUND CARE | Facility: HOSPITAL | Age: 85
End: 2020-09-10

## 2020-09-10 PROCEDURE — 99213 OFFICE O/P EST LOW 20 MIN: CPT | Performed by: PHYSICIAN ASSISTANT

## 2020-09-17 ENCOUNTER — OFFICE VISIT (OUTPATIENT)
Dept: WOUND CARE | Facility: HOSPITAL | Age: 85
End: 2020-09-17

## 2020-09-24 ENCOUNTER — OFFICE VISIT (OUTPATIENT)
Dept: WOUND CARE | Facility: HOSPITAL | Age: 85
End: 2020-09-24

## 2020-09-24 PROCEDURE — 99213 OFFICE O/P EST LOW 20 MIN: CPT | Performed by: SURGERY

## 2020-10-08 ENCOUNTER — OFFICE VISIT (OUTPATIENT)
Dept: WOUND CARE | Facility: HOSPITAL | Age: 85
End: 2020-10-08

## 2020-10-09 ENCOUNTER — HOSPITAL ENCOUNTER (OUTPATIENT)
Facility: HOSPITAL | Age: 85
Setting detail: OBSERVATION
Discharge: HOME | End: 2020-10-11
Attending: EMERGENCY MEDICINE | Admitting: INTERNAL MEDICINE

## 2020-10-09 ENCOUNTER — APPOINTMENT (OUTPATIENT)
Dept: GENERAL RADIOLOGY | Facility: HOSPITAL | Age: 85
End: 2020-10-09

## 2020-10-09 DIAGNOSIS — R50.9 FEVER, UNSPECIFIED FEVER CAUSE: ICD-10-CM

## 2020-10-09 DIAGNOSIS — R53.1 WEAKNESS: Primary | ICD-10-CM

## 2020-10-09 LAB
ALBUMIN SERPL-MCNC: 3.7 G/DL (ref 3.5–5.2)
ALBUMIN/GLOB SERPL: 1 G/DL
ALP SERPL-CCNC: 74 U/L (ref 39–117)
ALT SERPL W P-5'-P-CCNC: <5 U/L (ref 1–33)
ANION GAP SERPL CALCULATED.3IONS-SCNC: 12 MMOL/L (ref 5–15)
AST SERPL-CCNC: 19 U/L (ref 1–32)
BACTERIA UR QL AUTO: ABNORMAL /HPF
BASOPHILS # BLD AUTO: 0 10*3/MM3 (ref 0–0.2)
BASOPHILS NFR BLD AUTO: 0.6 % (ref 0–1.5)
BILIRUB SERPL-MCNC: 0.5 MG/DL (ref 0–1.2)
BILIRUB UR QL STRIP: NEGATIVE
BUN SERPL-MCNC: 30 MG/DL (ref 8–23)
BUN SERPL-MCNC: ABNORMAL MG/DL
BUN/CREAT SERPL: ABNORMAL
CALCIUM SPEC-SCNC: 8.7 MG/DL (ref 8.6–10.5)
CHLORIDE SERPL-SCNC: 103 MMOL/L (ref 98–107)
CLARITY UR: ABNORMAL
CO2 SERPL-SCNC: 23 MMOL/L (ref 22–29)
COLOR UR: YELLOW
CREAT SERPL-MCNC: 1.31 MG/DL (ref 0.57–1)
DEPRECATED RDW RBC AUTO: 55.6 FL (ref 37–54)
EOSINOPHIL # BLD AUTO: 0 10*3/MM3 (ref 0–0.4)
EOSINOPHIL NFR BLD AUTO: 0.6 % (ref 0.3–6.2)
ERYTHROCYTE [DISTWIDTH] IN BLOOD BY AUTOMATED COUNT: 17 % (ref 12.3–15.4)
GFR SERPL CREATININE-BSD FRML MDRD: 39 ML/MIN/1.73
GLOBULIN UR ELPH-MCNC: 3.7 GM/DL
GLUCOSE SERPL-MCNC: 122 MG/DL (ref 65–99)
GLUCOSE UR STRIP-MCNC: NEGATIVE MG/DL
GRAN CASTS URNS QL MICRO: ABNORMAL /LPF
HCT VFR BLD AUTO: 29.1 % (ref 34–46.6)
HGB BLD-MCNC: 9.5 G/DL (ref 12–15.9)
HGB UR QL STRIP.AUTO: ABNORMAL
KETONES UR QL STRIP: NEGATIVE
LEUKOCYTE ESTERASE UR QL STRIP.AUTO: ABNORMAL
LYMPHOCYTES # BLD AUTO: 0.3 10*3/MM3 (ref 0.7–3.1)
LYMPHOCYTES NFR BLD AUTO: 8.8 % (ref 19.6–45.3)
MCH RBC QN AUTO: 30.1 PG (ref 26.6–33)
MCHC RBC AUTO-ENTMCNC: 32.6 G/DL (ref 31.5–35.7)
MCV RBC AUTO: 92.3 FL (ref 79–97)
MONOCYTES # BLD AUTO: 0.4 10*3/MM3 (ref 0.1–0.9)
MONOCYTES NFR BLD AUTO: 12.5 % (ref 5–12)
NEUTROPHILS NFR BLD AUTO: 2.6 10*3/MM3 (ref 1.7–7)
NEUTROPHILS NFR BLD AUTO: 77.5 % (ref 42.7–76)
NITRITE UR QL STRIP: POSITIVE
NRBC BLD AUTO-RTO: 0.2 /100 WBC (ref 0–0.2)
PH UR STRIP.AUTO: 6.5 [PH] (ref 5–8)
PLATELET # BLD AUTO: 138 10*3/MM3 (ref 140–450)
PMV BLD AUTO: 7 FL (ref 6–12)
POTASSIUM SERPL-SCNC: 4.5 MMOL/L (ref 3.5–5.2)
PROT SERPL-MCNC: 7.4 G/DL (ref 6–8.5)
PROT UR QL STRIP: NEGATIVE
RBC # BLD AUTO: 3.15 10*6/MM3 (ref 3.77–5.28)
RBC # UR: ABNORMAL /HPF
REF LAB TEST METHOD: ABNORMAL
SARS-COV-2 RNA PNL SPEC NAA+PROBE: NOT DETECTED
SODIUM SERPL-SCNC: 138 MMOL/L (ref 136–145)
SP GR UR STRIP: 1.02 (ref 1–1.03)
SQUAMOUS #/AREA URNS HPF: ABNORMAL /HPF
UROBILINOGEN UR QL STRIP: ABNORMAL
WBC # BLD AUTO: 3.4 10*3/MM3 (ref 3.4–10.8)
WBC UR QL AUTO: ABNORMAL /HPF
WHOLE BLOOD HOLD SPECIMEN: NORMAL
YEAST URNS QL MICRO: ABNORMAL /HPF

## 2020-10-09 PROCEDURE — 96374 THER/PROPH/DIAG INJ IV PUSH: CPT

## 2020-10-09 PROCEDURE — 84443 ASSAY THYROID STIM HORMONE: CPT | Performed by: NURSE PRACTITIONER

## 2020-10-09 PROCEDURE — 85025 COMPLETE CBC W/AUTO DIFF WBC: CPT | Performed by: EMERGENCY MEDICINE

## 2020-10-09 PROCEDURE — 81001 URINALYSIS AUTO W/SCOPE: CPT | Performed by: EMERGENCY MEDICINE

## 2020-10-09 PROCEDURE — 71045 X-RAY EXAM CHEST 1 VIEW: CPT

## 2020-10-09 PROCEDURE — 87077 CULTURE AEROBIC IDENTIFY: CPT | Performed by: EMERGENCY MEDICINE

## 2020-10-09 PROCEDURE — 87086 URINE CULTURE/COLONY COUNT: CPT | Performed by: EMERGENCY MEDICINE

## 2020-10-09 PROCEDURE — 99220 PR INITIAL OBSERVATION CARE/DAY 70 MINUTES: CPT | Performed by: NURSE PRACTITIONER

## 2020-10-09 PROCEDURE — 80053 COMPREHEN METABOLIC PANEL: CPT | Performed by: EMERGENCY MEDICINE

## 2020-10-09 PROCEDURE — 25010000002 CEFTRIAXONE PER 250 MG: Performed by: EMERGENCY MEDICINE

## 2020-10-09 PROCEDURE — 99284 EMERGENCY DEPT VISIT MOD MDM: CPT

## 2020-10-09 PROCEDURE — C9803 HOPD COVID-19 SPEC COLLECT: HCPCS

## 2020-10-09 PROCEDURE — P9612 CATHETERIZE FOR URINE SPEC: HCPCS

## 2020-10-09 PROCEDURE — 87635 SARS-COV-2 COVID-19 AMP PRB: CPT | Performed by: EMERGENCY MEDICINE

## 2020-10-09 PROCEDURE — 87186 SC STD MICRODIL/AGAR DIL: CPT | Performed by: EMERGENCY MEDICINE

## 2020-10-09 PROCEDURE — 87040 BLOOD CULTURE FOR BACTERIA: CPT | Performed by: EMERGENCY MEDICINE

## 2020-10-09 PROCEDURE — 84145 PROCALCITONIN (PCT): CPT | Performed by: NURSE PRACTITIONER

## 2020-10-09 PROCEDURE — G0378 HOSPITAL OBSERVATION PER HR: HCPCS

## 2020-10-09 RX ORDER — ESOMEPRAZOLE MAGNESIUM 40 MG/1
40 CAPSULE, DELAYED RELEASE ORAL 2 TIMES DAILY
COMMUNITY

## 2020-10-09 RX ORDER — ACETAMINOPHEN 325 MG/1
650 TABLET ORAL EVERY 8 HOURS PRN
COMMUNITY

## 2020-10-09 RX ORDER — FUROSEMIDE 20 MG/1
20 TABLET ORAL DAILY
COMMUNITY

## 2020-10-09 RX ORDER — SODIUM CHLORIDE 9 MG/ML
100 INJECTION, SOLUTION INTRAVENOUS CONTINUOUS
Status: DISCONTINUED | OUTPATIENT
Start: 2020-10-09 | End: 2020-10-11 | Stop reason: HOSPADM

## 2020-10-09 RX ORDER — SODIUM CHLORIDE 0.9 % (FLUSH) 0.9 %
10 SYRINGE (ML) INJECTION AS NEEDED
Status: DISCONTINUED | OUTPATIENT
Start: 2020-10-09 | End: 2020-10-11 | Stop reason: HOSPADM

## 2020-10-09 RX ORDER — CYANOCOBALAMIN 1000 UG/ML
1000 INJECTION, SOLUTION INTRAMUSCULAR; SUBCUTANEOUS
COMMUNITY

## 2020-10-09 RX ORDER — FLUCONAZOLE 2 MG/ML
200 INJECTION, SOLUTION INTRAVENOUS ONCE
Status: COMPLETED | OUTPATIENT
Start: 2020-10-09 | End: 2020-10-10

## 2020-10-09 RX ORDER — POTASSIUM CHLORIDE 750 MG/1
10 TABLET, EXTENDED RELEASE ORAL DAILY
COMMUNITY

## 2020-10-09 RX ORDER — SODIUM CHLORIDE 0.9 % (FLUSH) 0.9 %
10 SYRINGE (ML) INJECTION EVERY 12 HOURS SCHEDULED
Status: DISCONTINUED | OUTPATIENT
Start: 2020-10-09 | End: 2020-10-11 | Stop reason: HOSPADM

## 2020-10-09 RX ORDER — ARIPIPRAZOLE 2 MG/1
2 TABLET ORAL NIGHTLY
Status: ON HOLD | COMMUNITY
End: 2022-10-23

## 2020-10-09 RX ORDER — ALBUTEROL SULFATE 0.63 MG/3ML
1 SOLUTION RESPIRATORY (INHALATION) EVERY 6 HOURS PRN
COMMUNITY
End: 2021-05-10

## 2020-10-09 RX ORDER — FERROUS SULFATE 325(65) MG
325 TABLET ORAL DAILY
COMMUNITY
End: 2021-05-10

## 2020-10-09 RX ORDER — IBUPROFEN 400 MG/1
400 TABLET ORAL 2 TIMES DAILY PRN
COMMUNITY
End: 2021-07-27

## 2020-10-09 RX ORDER — ALBUTEROL SULFATE 90 UG/1
2 AEROSOL, METERED RESPIRATORY (INHALATION) EVERY 4 HOURS PRN
COMMUNITY
End: 2021-05-10

## 2020-10-09 RX ADMIN — CEFTRIAXONE SODIUM 1 G: 10 INJECTION, POWDER, FOR SOLUTION INTRAVENOUS at 21:28

## 2020-10-10 LAB
ANION GAP SERPL CALCULATED.3IONS-SCNC: 9 MMOL/L (ref 5–15)
B PARAPERT DNA SPEC QL NAA+PROBE: NOT DETECTED
B PERT DNA SPEC QL NAA+PROBE: NOT DETECTED
BUN SERPL-MCNC: 28 MG/DL (ref 8–23)
BUN SERPL-MCNC: ABNORMAL MG/DL
BUN/CREAT SERPL: ABNORMAL
C PNEUM DNA NPH QL NAA+NON-PROBE: NOT DETECTED
CALCIUM SPEC-SCNC: 8 MG/DL (ref 8.6–10.5)
CHLORIDE SERPL-SCNC: 108 MMOL/L (ref 98–107)
CO2 SERPL-SCNC: 22 MMOL/L (ref 22–29)
CREAT SERPL-MCNC: 1.2 MG/DL (ref 0.57–1)
DEPRECATED RDW RBC AUTO: 56.9 FL (ref 37–54)
ERYTHROCYTE [DISTWIDTH] IN BLOOD BY AUTOMATED COUNT: 17.2 % (ref 12.3–15.4)
FLUAV H1 2009 PAND RNA NPH QL NAA+PROBE: NOT DETECTED
FLUAV H1 HA GENE NPH QL NAA+PROBE: NOT DETECTED
FLUAV H3 RNA NPH QL NAA+PROBE: NOT DETECTED
FLUAV SUBTYP SPEC NAA+PROBE: NOT DETECTED
FLUBV RNA ISLT QL NAA+PROBE: NOT DETECTED
GFR SERPL CREATININE-BSD FRML MDRD: 43 ML/MIN/1.73
GLUCOSE SERPL-MCNC: 157 MG/DL (ref 65–99)
HADV DNA SPEC NAA+PROBE: NOT DETECTED
HCOV 229E RNA SPEC QL NAA+PROBE: NOT DETECTED
HCOV HKU1 RNA SPEC QL NAA+PROBE: NOT DETECTED
HCOV NL63 RNA SPEC QL NAA+PROBE: NOT DETECTED
HCOV OC43 RNA SPEC QL NAA+PROBE: NOT DETECTED
HCT VFR BLD AUTO: 23.6 % (ref 34–46.6)
HGB BLD-MCNC: 7.6 G/DL (ref 12–15.9)
HMPV RNA NPH QL NAA+NON-PROBE: NOT DETECTED
HPIV1 RNA SPEC QL NAA+PROBE: NOT DETECTED
HPIV2 RNA SPEC QL NAA+PROBE: NOT DETECTED
HPIV3 RNA NPH QL NAA+PROBE: NOT DETECTED
HPIV4 P GENE NPH QL NAA+PROBE: NOT DETECTED
M PNEUMO IGG SER IA-ACNC: NOT DETECTED
MCH RBC QN AUTO: 30 PG (ref 26.6–33)
MCHC RBC AUTO-ENTMCNC: 32.3 G/DL (ref 31.5–35.7)
MCV RBC AUTO: 93 FL (ref 79–97)
PLATELET # BLD AUTO: 107 10*3/MM3 (ref 140–450)
PMV BLD AUTO: 7.4 FL (ref 6–12)
POTASSIUM SERPL-SCNC: 3.7 MMOL/L (ref 3.5–5.2)
PROCALCITONIN SERPL-MCNC: 0.24 NG/ML (ref 0–0.25)
PROCALCITONIN SERPL-MCNC: 0.31 NG/ML (ref 0–0.25)
RBC # BLD AUTO: 2.53 10*6/MM3 (ref 3.77–5.28)
RHINOVIRUS RNA SPEC NAA+PROBE: NOT DETECTED
RSV RNA NPH QL NAA+NON-PROBE: NOT DETECTED
SODIUM SERPL-SCNC: 139 MMOL/L (ref 136–145)
TSH SERPL DL<=0.05 MIU/L-ACNC: 1.08 UIU/ML (ref 0.27–4.2)
VANCOMYCIN SERPL-MCNC: 9 MCG/ML (ref 5–40)
WBC # BLD AUTO: 2.5 10*3/MM3 (ref 3.4–10.8)

## 2020-10-10 PROCEDURE — 80202 ASSAY OF VANCOMYCIN: CPT | Performed by: NURSE PRACTITIONER

## 2020-10-10 PROCEDURE — 25010000002 ENOXAPARIN PER 10 MG: Performed by: NURSE PRACTITIONER

## 2020-10-10 PROCEDURE — 25010000002 FLUCONAZOLE PER 200 MG: Performed by: NURSE PRACTITIONER

## 2020-10-10 PROCEDURE — 25010000002 VANCOMYCIN 1 G RECONSTITUTED SOLUTION 1 EACH VIAL: Performed by: INTERNAL MEDICINE

## 2020-10-10 PROCEDURE — 25010000002 CEFEPIME PER 500 MG: Performed by: NURSE PRACTITIONER

## 2020-10-10 PROCEDURE — 99225 PR SBSQ OBSERVATION CARE/DAY 25 MINUTES: CPT | Performed by: INTERNAL MEDICINE

## 2020-10-10 PROCEDURE — 96361 HYDRATE IV INFUSION ADD-ON: CPT

## 2020-10-10 PROCEDURE — 0100U HC BIOFIRE FILMARRAY RESP PANEL 2: CPT | Performed by: NURSE PRACTITIONER

## 2020-10-10 PROCEDURE — 85027 COMPLETE CBC AUTOMATED: CPT | Performed by: NURSE PRACTITIONER

## 2020-10-10 PROCEDURE — 84145 PROCALCITONIN (PCT): CPT | Performed by: NURSE PRACTITIONER

## 2020-10-10 PROCEDURE — G0378 HOSPITAL OBSERVATION PER HR: HCPCS

## 2020-10-10 PROCEDURE — 80048 BASIC METABOLIC PNL TOTAL CA: CPT | Performed by: NURSE PRACTITIONER

## 2020-10-10 PROCEDURE — 96372 THER/PROPH/DIAG INJ SC/IM: CPT

## 2020-10-10 PROCEDURE — 25010000002 VANCOMYCIN 10 G RECONSTITUTED SOLUTION: Performed by: NURSE PRACTITIONER

## 2020-10-10 RX ORDER — ACETAMINOPHEN 650 MG/1
650 SUPPOSITORY RECTAL EVERY 4 HOURS PRN
Status: DISCONTINUED | OUTPATIENT
Start: 2020-10-10 | End: 2020-10-11 | Stop reason: HOSPADM

## 2020-10-10 RX ORDER — DULOXETIN HYDROCHLORIDE 30 MG/1
60 CAPSULE, DELAYED RELEASE ORAL DAILY
Status: DISCONTINUED | OUTPATIENT
Start: 2020-10-10 | End: 2020-10-11 | Stop reason: HOSPADM

## 2020-10-10 RX ORDER — ACETAMINOPHEN 325 MG/1
650 TABLET ORAL EVERY 6 HOURS PRN
Status: DISCONTINUED | OUTPATIENT
Start: 2020-10-10 | End: 2020-10-11 | Stop reason: HOSPADM

## 2020-10-10 RX ORDER — ALLOPURINOL 100 MG/1
100 TABLET ORAL DAILY
Status: DISCONTINUED | OUTPATIENT
Start: 2020-10-10 | End: 2020-10-11 | Stop reason: HOSPADM

## 2020-10-10 RX ORDER — ALBUTEROL SULFATE 0.63 MG/3ML
1 SOLUTION RESPIRATORY (INHALATION) EVERY 6 HOURS PRN
Status: DISCONTINUED | OUTPATIENT
Start: 2020-10-10 | End: 2020-10-11 | Stop reason: HOSPADM

## 2020-10-10 RX ORDER — ASCORBIC ACID 500 MG
500 TABLET ORAL DAILY
Status: DISCONTINUED | OUTPATIENT
Start: 2020-10-10 | End: 2020-10-11 | Stop reason: HOSPADM

## 2020-10-10 RX ORDER — MELATONIN
1000 DAILY
Status: DISCONTINUED | OUTPATIENT
Start: 2020-10-10 | End: 2020-10-11 | Stop reason: HOSPADM

## 2020-10-10 RX ORDER — BACLOFEN 10 MG/1
5 TABLET ORAL 2 TIMES DAILY
Status: DISCONTINUED | OUTPATIENT
Start: 2020-10-10 | End: 2020-10-11 | Stop reason: HOSPADM

## 2020-10-10 RX ORDER — PANTOPRAZOLE SODIUM 40 MG/1
40 TABLET, DELAYED RELEASE ORAL
Status: DISCONTINUED | OUTPATIENT
Start: 2020-10-10 | End: 2020-10-11 | Stop reason: HOSPADM

## 2020-10-10 RX ORDER — CYANOCOBALAMIN 1000 UG/ML
1000 INJECTION, SOLUTION INTRAMUSCULAR; SUBCUTANEOUS
Status: DISCONTINUED | OUTPATIENT
Start: 2020-11-07 | End: 2020-10-11 | Stop reason: HOSPADM

## 2020-10-10 RX ORDER — FERROUS SULFATE TAB EC 324 MG (65 MG FE EQUIVALENT) 324 (65 FE) MG
324 TABLET DELAYED RESPONSE ORAL
Status: DISCONTINUED | OUTPATIENT
Start: 2020-10-10 | End: 2020-10-11 | Stop reason: HOSPADM

## 2020-10-10 RX ORDER — ARIPIPRAZOLE 2 MG/1
2 TABLET ORAL DAILY
Status: DISCONTINUED | OUTPATIENT
Start: 2020-10-10 | End: 2020-10-11 | Stop reason: HOSPADM

## 2020-10-10 RX ORDER — CETIRIZINE HYDROCHLORIDE 10 MG/1
10 TABLET ORAL DAILY
Status: DISCONTINUED | OUTPATIENT
Start: 2020-10-10 | End: 2020-10-11 | Stop reason: HOSPADM

## 2020-10-10 RX ORDER — HYDROCODONE BITARTRATE AND ACETAMINOPHEN 7.5; 325 MG/1; MG/1
1 TABLET ORAL EVERY 6 HOURS PRN
Status: DISCONTINUED | OUTPATIENT
Start: 2020-10-10 | End: 2020-10-11 | Stop reason: HOSPADM

## 2020-10-10 RX ADMIN — FLUCONAZOLE 200 MG: 2 INJECTION, SOLUTION INTRAVENOUS at 01:32

## 2020-10-10 RX ADMIN — ACETAMINOPHEN 650 MG: 650 SUPPOSITORY RECTAL at 00:54

## 2020-10-10 RX ADMIN — ENOXAPARIN SODIUM 40 MG: 40 INJECTION SUBCUTANEOUS at 17:34

## 2020-10-10 RX ADMIN — SODIUM CHLORIDE 500 ML: 9 INJECTION, SOLUTION INTRAVENOUS at 00:23

## 2020-10-10 RX ADMIN — OXYCODONE HYDROCHLORIDE AND ACETAMINOPHEN 500 MG: 500 TABLET ORAL at 09:26

## 2020-10-10 RX ADMIN — SODIUM CHLORIDE 100 ML/HR: 9 INJECTION, SOLUTION INTRAVENOUS at 19:10

## 2020-10-10 RX ADMIN — Medication 10 ML: at 00:24

## 2020-10-10 RX ADMIN — Medication 10 ML: at 09:26

## 2020-10-10 RX ADMIN — ALLOPURINOL 100 MG: 100 TABLET ORAL at 09:26

## 2020-10-10 RX ADMIN — ACETAMINOPHEN 650 MG: 325 TABLET, FILM COATED ORAL at 05:31

## 2020-10-10 RX ADMIN — ACETAMINOPHEN 650 MG: 325 TABLET, FILM COATED ORAL at 17:39

## 2020-10-10 RX ADMIN — CEFEPIME HYDROCHLORIDE 2 G: 2 INJECTION, POWDER, FOR SOLUTION INTRAVENOUS at 04:51

## 2020-10-10 RX ADMIN — CARBIDOPA AND LEVODOPA 2 TABLET: 25; 100 TABLET ORAL at 17:34

## 2020-10-10 RX ADMIN — Medication: at 14:21

## 2020-10-10 RX ADMIN — ARIPIPRAZOLE 2 MG: 2 TABLET ORAL at 09:26

## 2020-10-10 RX ADMIN — SODIUM CHLORIDE 1000 MG: 900 INJECTION, SOLUTION INTRAVENOUS at 17:53

## 2020-10-10 RX ADMIN — HYDROCODONE BITARTRATE AND ACETAMINOPHEN 1 TABLET: 7.5; 325 TABLET ORAL at 19:00

## 2020-10-10 RX ADMIN — PANTOPRAZOLE SODIUM 40 MG: 40 TABLET, DELAYED RELEASE ORAL at 09:26

## 2020-10-10 RX ADMIN — Medication 1000 UNITS: at 09:26

## 2020-10-10 RX ADMIN — LEVOTHYROXINE SODIUM 137 MCG: 25 TABLET ORAL at 05:01

## 2020-10-10 RX ADMIN — SODIUM CHLORIDE 1250 MG: 9 INJECTION, SOLUTION INTRAVENOUS at 02:19

## 2020-10-10 RX ADMIN — BACLOFEN 5 MG: 10 TABLET ORAL at 09:26

## 2020-10-10 RX ADMIN — DULOXETINE HYDROCHLORIDE 60 MG: 30 CAPSULE, DELAYED RELEASE ORAL at 09:26

## 2020-10-10 RX ADMIN — FERROUS SULFATE TAB EC 324 MG (65 MG FE EQUIVALENT) 324 MG: 324 (65 FE) TABLET DELAYED RESPONSE at 09:26

## 2020-10-10 RX ADMIN — CEFEPIME HYDROCHLORIDE 2 G: 2 INJECTION, POWDER, FOR SOLUTION INTRAVENOUS at 17:34

## 2020-10-10 RX ADMIN — BACLOFEN 5 MG: 10 TABLET ORAL at 20:03

## 2020-10-10 RX ADMIN — CARBIDOPA AND LEVODOPA 2 TABLET: 25; 100 TABLET ORAL at 20:03

## 2020-10-10 RX ADMIN — SODIUM CHLORIDE 100 ML/HR: 9 INJECTION, SOLUTION INTRAVENOUS at 01:31

## 2020-10-10 RX ADMIN — Medication 10 ML: at 20:04

## 2020-10-10 RX ADMIN — CETIRIZINE HYDROCHLORIDE 10 MG: 10 TABLET, FILM COATED ORAL at 09:26

## 2020-10-10 RX ADMIN — CARBIDOPA AND LEVODOPA 2 TABLET: 25; 100 TABLET ORAL at 09:26

## 2020-10-10 NOTE — PROGRESS NOTES
[facility] Medicine Services Daily Progress Note      Hospitalist Team  LOS 0 days      Patient Care Team:  Leobardo Kim APRN as PCP - General  Chandra, Monika JIMENEZ as PCP - Claims Attributed    Patient Location: 362/1      Subjective   Subjective     Chief Complaint / Subjective  Chief Complaint   Patient presents with   • Weakness - Generalized         Brief Synopsis of Hospital Course/HPI  Ms. Owens is a 85 y.o. who presented to [facility] complaining of generalized weakness which started today. The patient states that she got a flu shot yesterday then started feeling poorly. She states she felt well prior to getting a shot. She reports subjective fever and chills without cough, shortness of breath, dysuria, or known source of infection.  She has no known ill contacts.  The patient has bilateral lower extremity edema and presented with Unna boots on bilateral lower extremities.  She has a history of venous stasis ulcers.  Her bilateral lower extremities have areas of epidermal skin loss without surrounding erythema.  There is discoloration of the bilateral lower extremities consistent with chronic venous stasis and chronic edema.     Social history: The patient is a resident at [facility] living [facility].  She is wheelchair bound.        10/10- Patient reports she is feeling much better today.         Review of Systems   Constitution: Negative for chills, diaphoresis and fever.   Cardiovascular: Negative for chest pain and palpitations.   Respiratory: Negative for cough and shortness of breath.    Gastrointestinal: Negative for abdominal pain, diarrhea, nausea and vomiting.         Objective   Objective      Vital Signs  Temp:  [98.2 °F (36.8 °C)-103 °F (39.4 °C)] 98.2 °F (36.8 °C)  Heart Rate:  [] 83  Resp:  [16-20] 19  BP: (116-155)/(39-70) 116/65  Oxygen Therapy  SpO2: 96 %  Pulse Oximetry Type: Intermittent  Device (Oxygen Therapy): room air  Flowsheet  "Rows      First Filed Value   Admission Height  152.4 cm (60\") Documented at 10/09/2020 1941   Admission Weight  90.7 kg (200 lb) Documented at 10/09/2020 1941        Intake & Output (last 3 days)       10/07 0701 - 10/08 0700 10/08 0701 - 10/09 0700 10/09 0701 - 10/10 0700 10/10 0701 - 10/11 0700    P.O.   360 960    I.V. (mL/kg)   1000 (11)     IV Piggyback   250     Total Intake(mL/kg)   1610 (17.8) 960 (10.6)    Net   +1610 +960            Urine Unmeasured Occurrence   2 x 1 x        Lines, Drains & Airways    Active LDAs     Name:   Placement date:   Placement time:   Site:   Days:    Peripheral IV 10/09/20 1939 Right Antecubital   10/09/20    1939    Antecubital   less than 1                  Physical Exam:    Physical Exam  Vitals signs reviewed.   Constitutional:       General: She is not in acute distress.     Appearance: She is obese.   HENT:      Head: Normocephalic and atraumatic.      Mouth/Throat:      Mouth: Mucous membranes are moist.   Cardiovascular:      Rate and Rhythm: Normal rate and regular rhythm.      Heart sounds: Murmur present.   Pulmonary:      Effort: Pulmonary effort is normal. No respiratory distress.      Breath sounds: No wheezing.   Abdominal:      General: Bowel sounds are normal. There is no distension.      Tenderness: There is no abdominal tenderness. There is no guarding.   Skin:     General: Skin is warm and dry.   Neurological:      Mental Status: She is alert and oriented to person, place, and time.   Psychiatric:         Mood and Affect: Mood normal.         Behavior: Behavior normal.              Wounds (last 24 hours)      LDA Wound     Row Name 10/10/20 0750             Wound 10/07/19 0303 Bilateral lower leg Venous Ulcer    Wound - Properties Group Placement Date: 10/07/19  -BR Placement Time: 0303  -BR Present on Hospital Admission: Y  -BR Side: Bilateral  -BR Location: leg  -BR Primary Wound Type: Venous ulcer  -BR Additional Comments: 1+ pedal edema with stasis " changes present on bilateral lower extremities.   -BR    Dressing Appearance  open to air;no drainage  -VB      Retired Wound - Properties Group Date first assessed: 10/07/19  -BR Time first assessed: 0303  -BR Present on Hospital Admission: Y  -BR Side: Bilateral  -BR Retired Orientation: lower  -BR Location: leg  -BR Primary Wound Type: Venous ulcer  -BR Retired Stage, Pressure Injury : Stage 1  -BR Additional Comments: 1+ pedal edema with stasis changes present on bilateral lower extremities.   -BR      User Key  (r) = Recorded By, (t) = Taken By, (c) = Cosigned By    Initials Name Provider Type    BR Franca Pope RN Registered Nurse    Claire Rea RN Registered Nurse          Procedures:              Results Review:     I reviewed the patient's new clinical results.      Lab Results (last 24 hours)     Procedure Component Value Units Date/Time    Vancomycin, Random [[id number]] Collected: 10/10/20 1254    Specimen: Blood Updated: 10/10/20 1308    Urine Culture - Urine, Urine, Catheter [[id number]]  (Normal) Collected: 10/09/20 1939    Specimen: Urine, Catheter Updated: 10/10/20 1208     Urine Culture Culture in progress    BUN [[id number]]  (Abnormal) Collected: 10/10/20 0405    Specimen: Blood Updated: 10/10/20 0720     BUN 28 mg/dL     Procalcitonin [[id number]]  (Normal) Collected: 10/10/20 0405    Specimen: Blood Updated: 10/10/20 0539     Procalcitonin 0.24 ng/mL     Narrative:      As a Marker for Sepsis (Non-Neonates):   1. <0.5 ng/mL represents a low risk of severe sepsis and/or septic shock.  1. >2 ng/mL represents a high risk of severe sepsis and/or septic shock.    As a Marker for Lower Respiratory Tract Infections that require antibiotic therapy:  PCT on Admission     Antibiotic Therapy             6-12 Hrs later  > 0.5                Strongly Recommended            >0.25 - <0.5         Recommended  0.1 - 0.25           Discouraged                   Remeasure/reassess PCT  <0.1             "     Strongly Discouraged          Remeasure/reassess PCT      As 28 day mortality risk marker: \"Change in Procalcitonin Result\" (> 80 % or <=80 %) if Day 0 (or Day 1) and Day 4 values are available. Refer to http://www.[facility]-pct-calculator.com/   Change in PCT <=80 %   A decrease of PCT levels below or equal to 80 % defines a positive change in PCT test result representing a higher risk for 28-day all-cause mortality of patients diagnosed with severe sepsis or septic shock.  Change in PCT > 80 %   A decrease of PCT levels of more than 80 % defines a negative change in PCT result representing a lower risk for 28-day all-cause mortality of patients diagnosed with severe sepsis or septic shock.                Results may be falsely decreased if patient taking Biotin.     Basic Metabolic Panel [[id number]]  (Abnormal) Collected: 10/10/20 0405    Specimen: Blood Updated: 10/10/20 0532     Glucose 157 mg/dL      BUN --     Comment: Testing performed by alternate method        Creatinine 1.20 mg/dL      Sodium 139 mmol/L      Potassium 3.7 mmol/L      Comment: Slight hemolysis detected by analyzer. Results may be affected.        Chloride 108 mmol/L      CO2 22.0 mmol/L      Calcium 8.0 mg/dL      eGFR Non African Amer 43 mL/min/1.73      BUN/Creatinine Ratio --     Comment: Testing not performed        Anion Gap 9.0 mmol/L     Narrative:      GFR Normal >60  Chronic Kidney Disease <60  Kidney Failure <15      CBC (No Diff) [[id number]]  (Abnormal) Collected: 10/10/20 0405    Specimen: Blood Updated: 10/10/20 0512     WBC 2.50 10*3/mm3      RBC 2.53 10*6/mm3      Hemoglobin 7.6 g/dL      Hematocrit 23.6 %      MCV 93.0 fL      MCH 30.0 pg      MCHC 32.3 g/dL      RDW 17.2 %      RDW-SD 56.9 fl      MPV 7.4 fL      Platelets 107 10*3/mm3     Respiratory Panel, PCR - Swab, Nasopharynx [[id number]]  (Normal) Collected: 10/10/20 0146    Specimen: Swab from Nasopharynx Updated: 10/10/20 0330     ADENOVIRUS, PCR Not Detected     " "Coronavirus 229E Not Detected     Coronavirus HKU1 Not Detected     Coronavirus NL63 Not Detected     Coronavirus OC43 Not Detected     Human Metapneumovirus Not Detected     Human Rhinovirus/Enterovirus Not Detected     Influenza B PCR Not Detected     Parainfluenza Virus 1 Not Detected     Parainfluenza Virus 2 Not Detected     Parainfluenza Virus 3 Not Detected     Parainfluenza Virus 4 Not Detected     Bordetella pertussis pcr Not Detected     Influenza A H1 2009 PCR Not Detected     Chlamydophila pneumoniae PCR Not Detected     Mycoplasma pneumo by PCR Not Detected     Influenza A PCR Not Detected     Influenza A H3 Not Detected     Influenza A H1 Not Detected     RSV, PCR Not Detected     Bordetella parapertussis PCR Not Detected    Narrative:      The coronavirus on the RVP is NOT COVID-19 and is NOT indicative of infection with COVID-19.     Procalcitonin [[id number]]  (Abnormal) Collected: 10/09/20 1940    Specimen: Blood Updated: 10/10/20 0235     Procalcitonin 0.31 ng/mL     Narrative:      As a Marker for Sepsis (Non-Neonates):   1. <0.5 ng/mL represents a low risk of severe sepsis and/or septic shock.  1. >2 ng/mL represents a high risk of severe sepsis and/or septic shock.    As a Marker for Lower Respiratory Tract Infections that require antibiotic therapy:  PCT on Admission     Antibiotic Therapy             6-12 Hrs later  > 0.5                Strongly Recommended            >0.25 - <0.5         Recommended  0.1 - 0.25           Discouraged                   Remeasure/reassess PCT  <0.1                 Strongly Discouraged          Remeasure/reassess PCT      As 28 day mortality risk marker: \"Change in Procalcitonin Result\" (> 80 % or <=80 %) if Day 0 (or Day 1) and Day 4 values are available. Refer to http://www.[employer]-pct-calculator.com/   Change in PCT <=80 %   A decrease of PCT levels below or equal to 80 % defines a positive change in PCT test result representing a higher risk for 28-day " all-cause mortality of patients diagnosed with severe sepsis or septic shock.  Change in PCT > 80 %   A decrease of PCT levels of more than 80 % defines a negative change in PCT result representing a lower risk for 28-day all-cause mortality of patients diagnosed with severe sepsis or septic shock.                Results may be falsely decreased if patient taking Biotin.     TSH [[id number]]  (Normal) Collected: 10/09/20 1940    Specimen: Blood Updated: 10/10/20 0149     TSH 1.080 uIU/mL     Blood Culture - Blood, Arm, Right [[id number]] Collected: 10/09/20 2128    Specimen: Blood from Arm, Right Updated: 10/09/20 2205    COVID-19,[facility] IN-HOUSE,Nasal Swab No Transport Media 3-4 HR TAT - Swab, Nasal Cavity [[id number]]  (Normal) Collected: 10/09/20 2010    Specimen: Swab from Nasal Cavity Updated: 10/09/20 2056     COVID19 Not Detected    Narrative:      Fact sheet for providers: https://www.fda.gov/media/040055/download     Fact sheet for patients: https://www.fda.gov/media/503482/download    BUN [[id number]]  (Abnormal) Collected: 10/09/20 1940    Specimen: Blood Updated: 10/09/20 2038     BUN 30 mg/dL     Urinalysis, Microscopic Only - Urine, Catheter [[id number]]  (Abnormal) Collected: 10/09/20 1939    Specimen: Urine, Catheter Updated: 10/09/20 2035     RBC, UA 3-5 /HPF      WBC, UA Too Numerous to Count /HPF      Bacteria, UA None Seen /HPF      Squamous Epithelial Cells, UA None Seen /HPF      Yeast, UA       Moderate/2+ Budding Yeast w/Hyphae     /HPF     Granular Casts, UA 3-6 /LPF      Methodology Manual Light Microscopy    Comprehensive Metabolic Panel [[id number]]  (Abnormal) Collected: 10/09/20 1940    Specimen: Blood Updated: 10/09/20 2024     Glucose 122 mg/dL      BUN --     Comment: Testing performed by alternate method        Creatinine 1.31 mg/dL      Sodium 138 mmol/L      Potassium 4.5 mmol/L      Chloride 103 mmol/L      CO2 23.0 mmol/L      Calcium 8.7 mg/dL      Total Protein 7.4 g/dL       Albumin 3.70 g/dL      ALT (SGPT) <5 U/L      AST (SGOT) 19 U/L      Alkaline Phosphatase 74 U/L      Total Bilirubin 0.5 mg/dL      eGFR Non African Amer 39 mL/min/1.73      Globulin 3.7 gm/dL      A/G Ratio 1.0 g/dL      BUN/Creatinine Ratio --     Comment: Testing not performed        Anion Gap 12.0 mmol/L     Narrative:      GFR Normal >60  Chronic Kidney Disease <60  Kidney Failure <15      Urinalysis With Culture If Indicated - Urine, Catheter [[id number]]  (Abnormal) Collected: 10/09/20 1939    Specimen: Urine, Catheter Updated: 10/09/20 2003     Color, UA Yellow     Appearance, UA Cloudy     Comment: Result checked         pH, UA 6.5     Specific Gravity, UA 1.020     Glucose, UA Negative     Ketones, UA Negative     Bilirubin, UA Negative     Blood, UA Trace     Protein, UA Negative     Leuk Esterase, UA Large (3+)     Nitrite, UA Positive     Urobilinogen, UA 1.0 E.U./dL    Extra Tubes [[id number]] Collected: 10/09/20 1749    Specimen: Blood, Venous Line Updated: 10/09/20 2000    Narrative:      The following orders were created for panel order Extra Tubes.  Procedure                               Abnormality         Status                     ---------                               -----------         ------                     Light Blue Top[[id number]]                                   Final result                 Please view results for these tests on the individual orders.    Light Blue Top [[id number]] Collected: 10/09/20 1749    Specimen: Blood Updated: 10/09/20 2000     Extra Tube hold for add-on     Comment: Auto resulted       CBC & Differential [[id number]]  (Abnormal) Collected: 10/09/20 1940    Specimen: Blood Updated: 10/09/20 1954    Narrative:      The following orders were created for panel order CBC & Differential.  Procedure                               Abnormality         Status                     ---------                               -----------         ------                     CBC  Auto Differential[[id number]]        Abnormal            Final result                 Please view results for these tests on the individual orders.    CBC Auto Differential [[id number]]  (Abnormal) Collected: 10/09/20 1940    Specimen: Blood Updated: 10/09/20 1954     WBC 3.40 10*3/mm3      RBC 3.15 10*6/mm3      Hemoglobin 9.5 g/dL      Hematocrit 29.1 %      MCV 92.3 fL      MCH 30.1 pg      MCHC 32.6 g/dL      RDW 17.0 %      RDW-SD 55.6 fl      MPV 7.0 fL      Platelets 138 10*3/mm3      Neutrophil % 77.5 %      Lymphocyte % 8.8 %      Monocyte % 12.5 %      Eosinophil % 0.6 %      Basophil % 0.6 %      Neutrophils, Absolute 2.60 10*3/mm3      Lymphocytes, Absolute 0.30 10*3/mm3      Monocytes, Absolute 0.40 10*3/mm3      Eosinophils, Absolute 0.00 10*3/mm3      Basophils, Absolute 0.00 10*3/mm3      nRBC 0.2 /100 WBC     Blood Culture - Blood, Blood, Venous Line [[id number]] Collected: 10/09/20 1940    Specimen: Blood, Venous Line Updated: 10/09/20 1947        No results found for: HGBA1C            No results found for: LIPASE  No results found for: CHOL, CHLPL, TRIG, HDL, LDL, LDLDIRECT    No results found for: INTRAOP, PREDX, FINALDX, COMDX    Microbiology Results (last 10 days)     Procedure Component Value - Date/Time    Respiratory Panel, PCR - Swab, Nasopharynx [[id number]]  (Normal) Collected: 10/10/20 0146    Lab Status: Final result Specimen: Swab from Nasopharynx Updated: 10/10/20 0330     ADENOVIRUS, PCR Not Detected     Coronavirus 229E Not Detected     Coronavirus HKU1 Not Detected     Coronavirus NL63 Not Detected     Coronavirus OC43 Not Detected     Human Metapneumovirus Not Detected     Human Rhinovirus/Enterovirus Not Detected     Influenza B PCR Not Detected     Parainfluenza Virus 1 Not Detected     Parainfluenza Virus 2 Not Detected     Parainfluenza Virus 3 Not Detected     Parainfluenza Virus 4 Not Detected     Bordetella pertussis pcr Not Detected     Influenza A H1 2009 PCR Not Detected      Chlamydophila pneumoniae PCR Not Detected     Mycoplasma pneumo by PCR Not Detected     Influenza A PCR Not Detected     Influenza A H3 Not Detected     Influenza A H1 Not Detected     RSV, PCR Not Detected     Bordetella parapertussis PCR Not Detected    Narrative:      The coronavirus on the RVP is NOT COVID-19 and is NOT indicative of infection with COVID-19.     COVID-19,[facility] IN-HOUSE,Nasal Swab No Transport Media 3-4 HR TAT - Swab, Nasal Cavity [[id number]]  (Normal) Collected: 10/09/20 2010    Lab Status: Final result Specimen: Swab from Nasal Cavity Updated: 10/09/20 2056     COVID19 Not Detected    Narrative:      Fact sheet for providers: https://www.fda.gov/media/645959/download     Fact sheet for patients: https://www.fda.gov/media/846896/download    Urine Culture - Urine, Urine, Catheter [[id number]]  (Normal) Collected: 10/09/20 1939    Lab Status: Preliminary result Specimen: Urine, Catheter Updated: 10/10/20 1208     Urine Culture Culture in progress          ECG/EMG Results (most recent)     None                    Xr Chest 1 View    Result Date: 10/9/2020  1.Atelectatic changes lower lungs. 2.Cardiomegaly  Electronically Signed By-Magdi Alex On:10/9/2020 8:16 PM This report was finalized on [id number] by  Magdi Alex, .          Xrays, labs reviewed personally by physician.    Medication Review:   I have reviewed the patient's current medication list      Scheduled Meds  !Vancomycin Level Draw Needed, , Does not apply, Once  allopurinol, 100 mg, Oral, Daily  ARIPiprazole, 2 mg, Oral, Daily  baclofen, 5 mg, Oral, BID  carbidopa-levodopa, 2 tablet, Oral, TID  cefepime, 2 g, Intravenous, Q12H  cetirizine, 10 mg, Oral, Daily  cholecalciferol, 1,000 Units, Oral, Daily  [START ON 11/7/2020] cyanocobalamin, 1,000 mcg, Intramuscular, Q28 Days  DULoxetine, 60 mg, Oral, Daily  enoxaparin, 40 mg, Subcutaneous, Q24H  ferrous sulfate, 324 mg, Oral, Daily With Breakfast  levothyroxine, 137 mcg, Oral, Q  AM  pantoprazole, 40 mg, Oral, QAM AC  sodium chloride, 10 mL, Intravenous, Q12H  vitamin C, 500 mg, Oral, Daily        Meds Infusions  Pharmacy to Dose Cefepime,   Pharmacy to dose vancomycin,   sodium chloride, 100 mL/hr, Last Rate: 100 mL/hr (10/10/20 0131)        Meds PRN  acetaminophen  •  acetaminophen  •  albuterol  •  guaiFENesin  •  Pharmacy to Dose Cefepime  •  Pharmacy to dose vancomycin  •  [COMPLETED] Insert peripheral IV **AND** sodium chloride  •  sodium chloride  •  vancomycin        Assessment/Plan   Assessment/Plan     Active Hospital Problems    Diagnosis  POA   • Weakness [R53.1]  Yes   • Anxiety disorder [F41.9]  Yes   • Gastroesophageal reflux disease [K21.9]  Yes   • Fibromyalgia [M79.7]  Yes   • Hyperlipidemia [E78.5]  Yes   • Hypothyroidism [E03.9]  Yes   • Wheelchair dependence [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       MEDICAL DECISION MAKING COMPLEXITY BY PROBLEM:       Sepsis possibly d/t UTI with fever of 103.0  - continue IV Vancomycin and cefepime for now  - Blood cx- pending  - viral panel- negative  - COVID 19 negative  - Urine cx- pending   - procal minimally elevated      Acute UTI with possible Candida- POA  - s/p Diflucan x 1  - continue Cefepime as above  - urine cx- pending    CKD III  - baseline Cr ~1-1.2  - hold lasix  - IV fluids @ 100cc/hr     Anemia, chronic  - Hgb stable   - continue ferrous sulfate     Aortic Stenosis- mild  - echo 8/2018- reviewed      Gout, chronic  - Continue Allopurinol     Anxiety with mood stabilizer  - Continue Abilify; continue Sinemet; continue Cymbalta  - hold Lyrica; hold Remeron; Hold Requip     Psoriatic arthritis and fibromyalgia with chronic pain  - Hold Lyrica; hold Norco secondary to altered mental status  - hold baclofen     HLD, chronic  - Hold  TriCor     Allergies, chronic  - Hold Flonase as it is non-formulary  - continue cetirizine; continue guaifenesin     Dietary supplementation  - Hold cranberry  - continue vitamin B12     GERD, chronic  - Continue Nexium with formulary substitution; hold Pepcid     DVT Prophylaxis  - enoxaparin         VTE Prophylaxis -   Mechanical Order History:     None      Pharmalogical Order History:      Ordered     Dose Route Frequency Stop    10/09/20 2219  enoxaparin (LOVENOX) syringe 40 mg      40 mg SC Every 24 Hours --                  Code Status -   Code Status and Medical Interventions:   Ordered at: 10/09/20 2219     Level Of Support Discussed With:    Patient     Code Status:    CPR     Medical Interventions (Level of Support Prior to Arrest):    Full         Discharge Planning  Return to [facility] in 1-2 days           Electronically signed by Niles Montanez DO, 10/10/20, 13:36 EDT.  [facility]ist Team

## 2020-10-10 NOTE — PLAN OF CARE
Goal Outcome Evaluation:  Plan of Care Reviewed With: patient  Progress: improving  Outcome Summary: Patient feeling better by 0300.  Temp down to 98.9 and patient more alert and eating a snack.

## 2020-10-10 NOTE — ED PROVIDER NOTES
Subjective   Patient is an 85-year-old female sent in from nursing due to increased weakness.  Patient has no other complaints other than not feeling well.  Patient states his been feeling weak throughout the day today.          Review of Systems  Negative for headache earache sore throat cough fever chest pain shortness of breath abdominal pain vomiting diarrhea dysuria achiness weight loss or other complaint.  A complete review systems was obtained and is otherwise negative  Past Medical History:   Diagnosis Date   • Acute kidney injury (MIRI) with acute tubular necrosis (ATN) (CMS/[facility]) 10/7/2019   • Anemia 4/4/2013   • Anxiety disorder 6/17/2019   • Cellulitis of right lower extremity 10/7/2019   • Depression 1/23/2013   • Edema of lower extremity 5/17/2017   • Fibromyalgia 7/19/2013   • Gastroesophageal reflux disease 6/17/2019   • Hemorrhoids 4/3/2012   • Hyperlipidemia 7/19/2013   • Hypothyroidism 7/19/2013   • Low back pain 2/24/2016   • Peripheral venous insufficiency 9/26/2011   • Polyosteoarthritis 12/5/2011   • Sleep apnea 6/21/2013   • Wheelchair dependence 7/19/2013       Allergies   Allergen Reactions   • Latex Rash       No past surgical history on file.    No family history on file.    Social History     Socioeconomic History   • Marital status:      Spouse name: Not on file   • Number of children: Not on file   • Years of education: Not on file   • Highest education level: Not on file   Tobacco Use   • Smoking status: Never Smoker   • Smokeless tobacco: Never Used   Substance and Sexual Activity   • Alcohol use: No     Frequency: Never   • Drug use: No   • Sexual activity: Defer           Objective   Physical Exam  HEENT exam shows TMs to be clear.  Oropharynx comers but sclerae nonicteric.  Neck has no adenopathy JVD or bruits.  Lungs are clear.  Heart has regular rate rhythm without murmur gallop.  Chest is nontender.  Abdomen is soft nontender.  Extremity exam is no cyanosis or  edema.  Procedures           ED Course      Results for orders placed or performed during the hospital encounter of 10/09/20   COVID-19,[facility]HOUSE,Nasal Swab No Transport Media 3-4 HR TAT - Swab, Nasal Cavity    Specimen: Nasal Cavity; Swab   Result Value Ref Range    COVID19 Not Detected Not Detected - Ref. Range   Comprehensive Metabolic Panel    Specimen: Blood   Result Value Ref Range    Glucose 122 (H) 65 - 99 mg/dL    BUN      Creatinine 1.31 (H) 0.57 - 1.00 mg/dL    Sodium 138 136 - 145 mmol/L    Potassium 4.5 3.5 - 5.2 mmol/L    Chloride 103 98 - 107 mmol/L    CO2 23.0 22.0 - 29.0 mmol/L    Calcium 8.7 8.6 - 10.5 mg/dL    Total Protein 7.4 6.0 - 8.5 g/dL    Albumin 3.70 3.50 - 5.20 g/dL    ALT (SGPT) <5 1 - 33 U/L    AST (SGOT) 19 1 - 32 U/L    Alkaline Phosphatase 74 39 - 117 U/L    Total Bilirubin 0.5 0.0 - 1.2 mg/dL    eGFR Non African Amer 39 (L) >60 mL/min/1.73    Globulin 3.7 gm/dL    A/G Ratio 1.0 g/dL    BUN/Creatinine Ratio      Anion Gap 12.0 5.0 - 15.0 mmol/L   Urinalysis With Culture If Indicated - Urine, Catheter    Specimen: Urine, Catheter   Result Value Ref Range    Color, UA Yellow Yellow, Straw    Appearance, UA Cloudy (A) Clear    pH, UA 6.5 5.0 - 8.0    Specific Gravity, UA 1.020 1.005 - 1.030    Glucose, UA Negative Negative    Ketones, UA Negative Negative    Bilirubin, UA Negative Negative    Blood, UA Trace (A) Negative    Protein, UA Negative Negative    Leuk Esterase, UA Large (3+) (A) Negative    Nitrite, UA Positive (A) Negative    Urobilinogen, UA 1.0 E.U./dL 0.2 - 1.0 E.U./dL   CBC Auto Differential    Specimen: Blood   Result Value Ref Range    WBC 3.40 3.40 - 10.80 10*3/mm3    RBC 3.15 (L) 3.77 - 5.28 10*6/mm3    Hemoglobin 9.5 (L) 12.0 - 15.9 g/dL    Hematocrit 29.1 (L) 34.0 - 46.6 %    MCV 92.3 79.0 - 97.0 fL    MCH 30.1 26.6 - 33.0 pg    MCHC 32.6 31.5 - 35.7 g/dL    RDW 17.0 (H) 12.3 - 15.4 %    RDW-SD 55.6 (H) 37.0 - 54.0 fl    MPV 7.0 6.0 - 12.0 fL    Platelets  138 (L) 140 - 450 10*3/mm3    Neutrophil % 77.5 (H) 42.7 - 76.0 %    Lymphocyte % 8.8 (L) 19.6 - 45.3 %    Monocyte % 12.5 (H) 5.0 - 12.0 %    Eosinophil % 0.6 0.3 - 6.2 %    Basophil % 0.6 0.0 - 1.5 %    Neutrophils, Absolute 2.60 1.70 - 7.00 10*3/mm3    Lymphocytes, Absolute 0.30 (L) 0.70 - 3.10 10*3/mm3    Monocytes, Absolute 0.40 0.10 - 0.90 10*3/mm3    Eosinophils, Absolute 0.00 0.00 - 0.40 10*3/mm3    Basophils, Absolute 0.00 0.00 - 0.20 10*3/mm3    nRBC 0.2 0.0 - 0.2 /100 WBC   Urinalysis, Microscopic Only - Urine, Catheter    Specimen: Urine, Catheter   Result Value Ref Range    RBC, UA 3-5 (A) None Seen /HPF    WBC, UA Too Numerous to Count (A) None Seen /HPF    Bacteria, UA None Seen None Seen /HPF    Squamous Epithelial Cells, UA None Seen None Seen, 0-2 /HPF    Yeast, UA Moderate/2+ Budding Yeast w/Hyphae None Seen /HPF    Granular Casts, UA 3-6 None Seen /LPF    Methodology Manual Light Microscopy    BUN    Specimen: Blood   Result Value Ref Range    BUN 30 (H) 8 - 23 mg/dL   Light Blue Top   Result Value Ref Range    Extra Tube hold for add-on      Xr Chest 1 View    Result Date: 10/9/2020  1.Atelectatic changes lower lungs. 2.Cardiomegaly  Electronically Signed By-Magdi Alex On:10/9/2020 8:16 PM This report was finalized on [id number] by  Magdi Alex, .                                         MDM  Number of Diagnoses or Management Options  Diagnosis management comments: Patient is a 10 years, white cells nitrate positive in her urine.  There is no other source of possible infection.  There is no evidence of pneumonia.  She is COVID negative.  Metabolic panel is at baseline.  Blood cultures were obtained.  Patient was given Rocephin IV.  She will be admitted for further supportive care culture results and evaluation.  I did speak to the on-call hospitalist.       Amount and/or Complexity of Data Reviewed  Clinical lab tests: reviewed  Tests in the radiology section of CPT®: reviewed    Risk of  Complications, Morbidity, and/or Mortality  Presenting problems: high  Diagnostic procedures: high  Management options: high    Patient Progress  Patient progress: stable      Final diagnoses:   Weakness   Fever, unspecified fever cause            Thierno Whitaker MD  10/09/20 2111

## 2020-10-10 NOTE — PROGRESS NOTES
"Pharmacy Antimicrobial Dosing Service    Subjective:  Lorenza Owens is a 85 y.o.female admitted from LTCF with increased weakness. Pharmacy has been consulted to dose Vancomycin for possible sepsis.    PMH:     Assessment/Plan    1. Day #1 Vancomycin: Pulse dosing d/t renal dysfxn. 1250mg (~19mg/kg DBW) IV x1 dose.  Random level ordered for 10/10 at 1300.  Re-dose when less than 20.    Will continue to monitor drug levels, renal function, culture and sensitivities, and patient clinical status.       Objective:  Relevant clinical data and objective history reviewed:  152.4 cm (60\")   90.7 kg (200 lb)   Ideal body weight: 45.5 kg (100 lb 4.9 oz)  Adjusted ideal body weight: 63.6 kg (140 lb 3 oz)  Body mass index is 39.06 kg/m².        Results from last 7 days   Lab Units 10/09/20  1940   CREATININE mg/dL 1.31*     Estimated Creatinine Clearance: 32.7 mL/min (A) (by C-G formula based on SCr of 1.31 mg/dL (H)).  No intake/output data recorded.    Results from last 7 days   Lab Units 10/09/20  1940   WBC 10*3/mm3 3.40     Temperature    10/09/20 1921 10/09/20 1941 10/10/20 0024   Temp: (!) 103 °F (39.4 °C) (!) 101.3 °F (38.5 °C) (!) 101 °F (38.3 °C)     Baseline culture/source/susceptibility:  Microbiology Results (last 10 days)       Procedure Component Value - Date/Time    COVID-19,[facility]HOUSE,Nasal Swab No Transport Media 3-4 HR TAT - Swab, Nasal Cavity [[id number]]  (Normal) Collected: 10/09/20 2010    Lab Status: Final result Specimen: Swab from Nasal Cavity Updated: 10/09/20 2056     COVID19 Not Detected    Narrative:      Fact sheet for providers: https://www.fda.gov/media/112369/download     Fact sheet for patients: https://www.fda.gov/media/261548/download             Anti-Infectives (From admission, onward)      Ordered     Dose/Rate Route Frequency Start Stop    10/10/20 0117  !Vancomycin Level Draw Needed     Ordering Provider: Tillman, Mily, FNP     Does not apply Once 10/10/20 1300      10/10/20 0117  " vancomycin 1250 mg/250 mL 0.9% NS IVPB ([facility])     Ordering Provider: Mily Tillman FNP    1,250 mg Intravenous Once 10/10/20 0215      10/10/20 0117  vancomycin (VANCOCIN) 1,000 mg in sodium chloride 0.9 % 250 mL IVPB     Ordering Provider: Mily Tillman FNP    1,000 mg Intravenous As Needed 10/10/20 0116 10/17/20 0115    10/10/20 0104  Pharmacy to dose vancomycin     Ordering Provider: Mily Tillman FNP     Does not apply Continuous PRN 10/10/20 0104 10/17/20 0103    10/09/20 2210  fluconazole (DIFLUCAN) IVPB 200 mg     Ordering Provider: Mily Tillman FNP    200 mg  100 mL/hr over 60 Minutes Intravenous Once 10/09/20 2212      10/09/20 2101  cefTRIAXone (ROCEPHIN) in SWFI 1 gram/10ml IV PUSH syringe     Ordering Provider: Thierno Whitaker MD    1 g  over 5 Minutes Intravenous Once 10/09/20 2103 10/09/20 2133            Vinh Durand  10/10/20 01:51 EDT

## 2020-10-10 NOTE — H&P
[facility]      Patient Name: Lorenza Owens  : 1935  MRN: [id number]  Primary Care Physician: Leobardo Kim APRN  Date of admission: 10/9/2020    Patient Care Team:  Leobardo Kim APRN as PCP - General  Chandra, Monika JIMENEZ as PCP - Claims Attributed          Subjective   History Present Illness     Chief Complaint:   Chief Complaint   Patient presents with   • Weakness - Generalized         Ms. Owens is a 85 y.o.  presents to [facility] complaining of generalized weakness         85-year-old female presents to the ER with a chief complaint of generalized weakness which started today.  The patient states that she got a flu shot yesterday then started feeling poorly.  She states she felt well prior to getting a shot.  Patient is lethargic at time of interview.  She is oriented x3.  She reports subjective fever and chills without cough, shortness of breath, dysuria, or known source of infection.  She has no known ill contacts.  The patient has bilateral lower extremity edema and presented with Unna boots on bilateral lower extremities.  She has a history of venous stasis ulcers.  Her bilateral lower extremities have areas of epidermal skin loss without surrounding erythema.  There is discoloration of the bilateral lower extremities consistent with chronic venous stasis and chronic edema.    Social history: The patient is a resident at [facility].  She is wheelchair bound.        Review of Systems   Constitution: Positive for chills, fever and malaise/fatigue.   Respiratory: Negative for cough and shortness of breath.    Musculoskeletal: Positive for myalgias.   Gastrointestinal: Negative for abdominal pain, nausea and vomiting.   Genitourinary: Negative for dysuria.   Neurological: Positive for weakness. Negative for focal weakness.   All other systems reviewed and are negative.          Personal History     Past  Medical History:   Past Medical History:   Diagnosis Date   • Acute kidney injury (MIRI) with acute tubular necrosis (ATN) (CMS/[facility]) 10/7/2019   • Anemia 4/4/2013   • Anxiety disorder 6/17/2019   • Cellulitis of right lower extremity 10/7/2019   • Depression 1/23/2013   • Edema of lower extremity 5/17/2017   • Fibromyalgia 7/19/2013   • Gastroesophageal reflux disease 6/17/2019   • Hemorrhoids 4/3/2012   • Hyperlipidemia 7/19/2013   • Hypothyroidism 7/19/2013   • Low back pain 2/24/2016   • Peripheral venous insufficiency 9/26/2011   • Polyosteoarthritis 12/5/2011   • Sleep apnea 6/21/2013   • Wheelchair dependence 7/19/2013       Surgical History:      Past Surgical History:   Procedure Laterality Date   • KNEE ARTHROPLASTY             Family History: family history includes No Known Problems in her father and mother. Otherwise pertinent FHx was reviewed and unremarkable.     Social History:  reports that she has never smoked. She has never used smokeless tobacco. She reports that she does not drink alcohol or use drugs.      Medications:  Prior to Admission medications    Medication Sig Start Date End Date Taking? Authorizing Provider   acetaminophen (TYLENOL) 325 MG tablet Take 650 mg by mouth Every 8 (Eight) Hours As Needed for Mild Pain .   Yes ProviderBharat MD   albuterol (ACCUNEB) 0.63 MG/3ML nebulizer solution Take 1 ampule by nebulization Every 6 (Six) Hours As Needed for Wheezing.   Yes ProviderBharat MD   albuterol sulfate  (90 Base) MCG/ACT inhaler Inhale 2 puffs Every 4 (Four) Hours As Needed for Wheezing.   Yes ProviderBharat MD   allopurinol (ZYLOPRIM) 100 MG tablet Take 100 mg by mouth Daily.   Yes ProviderBharat MD   ARIPiprazole (ABILIFY) 2 MG tablet Take 2 mg by mouth Daily.   Yes ProviderBharat MD   baclofen (LIORESAL) 10 MG tablet Take 5 mg by mouth 2 (Two) Times a Day.   Yes ProviderBharat MD   carbidopa-levodopa (SINEMET)  MG per tablet  Take 2 tablets by mouth 3 (Three) Times a Day. 8/2/13  Yes Emergency, Nurse ALEX Naqvi   cetirizine (zyrTEC) 10 MG tablet Take 10 mg by mouth Daily.   Yes Bharat Gillespie MD   Cranberry 500 MG tablet Take 500 mg by mouth 2 (two) times a day.   Yes Bharat Gillespie MD   cyanocobalamin 1000 MCG/ML injection Inject 1,000 mcg into the appropriate muscle as directed by prescriber Every 28 (Twenty-Eight) Days. 11th each month   Yes Bharat Gillespie MD   DULoxetine HCl (CYMBALTA PO) Take 60 mg by mouth Daily.   Yes Emergency, Nurse Epic, RN   esomeprazole (nexIUM) 40 MG capsule Take 40 mg by mouth 2 (two) times a day.   Yes Bharat Gillespie MD   famotidine (PEPCID) 10 MG tablet Take 10 mg by mouth 2 (Two) Times a Day.   Yes Emergency, Nurse ALEX Naqvi   fenofibrate (TRICOR) 145 MG tablet Take 145 mg by mouth Daily.   Yes Bharat Gillespie MD   ferrous sulfate 325 (65 FE) MG tablet Take 325 mg by mouth Daily.   Yes Bharat Gillespie MD   fluticasone (FLONASE) 50 MCG/ACT nasal spray 1 spray into the nostril(s) as directed by provider Daily.   Yes Bharat Gillespie MD   furosemide (LASIX) 20 MG tablet Take 20 mg by mouth Daily.   Yes Bharat Gillespie MD   guaiFENesin (ROBITUSSIN) 100 MG/5ML syrup Take 600 mg by mouth Every 6 (Six) Hours As Needed for Cough.   Yes Bharat Gillespie MD   HYDROcodone-acetaminophen (NORCO) 7.5-325 MG per tablet Take 1 tablet by mouth Every 6 (Six) Hours As Needed for Moderate Pain .   Yes Bharat Gillespie MD   ibuprofen (ADVIL,MOTRIN) 400 MG tablet Take 400 mg by mouth 2 (Two) Times a Day As Needed for Mild Pain .   Yes Bharat Gillespie MD   levothyroxine (SYNTHROID, LEVOTHROID) 137 MCG tablet Take 137 mcg by mouth Daily.   Yes Bharat Gillespie MD   lubiprostone (AMITIZA) 24 MCG capsule Take 24 mcg by mouth 2 (Two) Times a Day With Meals.   Yes Bharat Gillespie MD   magnesium oxide (MAGOX) 400 (241.3 Mg) MG tablet tablet Take 400 mg by  mouth Daily.   Yes Bharat Gillespie MD   melatonin 5 MG tablet tablet Take 5 mg by mouth Every Night.   Yes Bharat Gillespie MD   polyethylene glycol (MIRALAX) packet Take 17 g by mouth Daily.   Yes Bharat Gillespie MD   potassium chloride (K-DUR,KLOR-CON) 20 MEQ CR tablet Take 20 mEq by mouth Daily.   Yes Bharat Gillespie MD   pregabalin (LYRICA) 50 MG capsule Take 50 mg by mouth Every Night.   Yes Bharat Gillespie MD   ramelteon (Rozerem) 8 MG tablet Take 8 mg by mouth Every Night.   Yes Emergency, Nurse Epic, RN   rOPINIRole (REQUIP) 4 MG tablet Take 3 mg by mouth Every Night.   Yes Bharat Gillespie MD   senna (SENOKOT) 8.6 MG tablet tablet Take  by mouth Every Night.   Yes Bharat Gillespie MD   simethicone (MYLICON) 80 MG chewable tablet Chew 80 mg Every 6 (Six) Hours As Needed for Flatulence.   Yes Bharat Gillespie MD   vitamin C (ASCORBIC ACID) 500 MG tablet Take 500 mg by mouth Daily.   Yes Bharat Gillespie MD   vitamin D (ERGOCALCIFEROL) 64676 units capsule capsule Take 50,000 Units by mouth 1 (One) Time Per Week.   Yes Bharat Gillespie MD   potassium chloride (K-DUR,KLOR-CON) 20 MEQ CR tablet Take 0.5 tablets by mouth Daily. 10/8/19 10/9/20 Yes Reyna Yao MD   ARIPiprazole (ABILIFY) 5 MG tablet Take 5 mg by mouth Daily.  10/9/20  Emergency, Nurse Donya RN   cephalexin (KEFLEX) 500 MG capsule Take 1 capsule by mouth 2 (Two) Times a Day. 7/22/20 10/9/20  Jj Faulkner MD   Cranberry 125 MG tablet Take  by mouth.  10/9/20  Emergency, Nurse Donya RN   esomeprazole (NexIUM) 40 MG packet NEXIUM PACK 4/2/12 10/9/20  Emergency, Nurse Donya RN   ferrous gluconate (FERGON) 324 MG tablet FERROUS GLUCONATE 324 (38 Fe) MG TABS 8/2/13 10/9/20  Emergency, Nurse Donya RN   furosemide (LASIX) 40 MG tablet Take 0.5 tablets by mouth Daily. 10/8/19 10/9/20  Reyna Yao MD   mupirocin (BACTROBAN) 2 % cream Apply  topically to the appropriate area as  directed 3 (Three) Times a Day. 6/17/19 10/9/20  Jj Faulkner MD   phenazopyridine (Pyridium) 200 MG tablet 1 tablet p.o. 3 times daily before meals 7/22/20 10/9/20  Jj Faulkner MD   vitamin B-12 (CYANOCOBALAMIN) 1000 MCG tablet Take 1,000 mcg by mouth Daily.  10/9/20  Provider, MD Bharat       Allergies:    Allergies   Allergen Reactions   • Latex Rash       Objective   Objective     Vital Signs  Temp:  [98.9 °F (37.2 °C)-103 °F (39.4 °C)] 98.9 °F (37.2 °C)  Heart Rate:  [] 88  Resp:  [16-20] 16  BP: (117-155)/(39-70) 117/61  SpO2:  [95 %-100 %] 95 %  on   ;   Device (Oxygen Therapy): room air  Body mass index is 39.06 kg/m².    Physical Exam  Vitals signs and nursing note reviewed.   Constitutional:       General: She is not in acute distress.     Appearance: She is ill-appearing. She is not toxic-appearing.   HENT:      Head: Normocephalic and atraumatic.      Nose: Nose normal. No congestion.      Mouth/Throat:      Mouth: Mucous membranes are dry.   Eyes:      General: No scleral icterus.        Right eye: No discharge.         Left eye: No discharge.      Extraocular Movements: Extraocular movements intact.      Conjunctiva/sclera: Conjunctivae normal.      Pupils: Pupils are equal, round, and reactive to light.   Neck:      Musculoskeletal: Normal range of motion and neck supple. No neck rigidity or muscular tenderness.   Cardiovascular:      Rate and Rhythm: Normal rate and regular rhythm.      Pulses: Normal pulses.      Heart sounds: Murmur present.      Comments: Grade 4/6 systolic murmur  Pulmonary:      Effort: Pulmonary effort is normal. No respiratory distress.      Comments: Increased respiratory rate likely secondary to increased metabolic demand related to fever  Abdominal:      General: Bowel sounds are normal. There is no distension.   Musculoskeletal: Normal range of motion.         General: Swelling present.      Right lower leg: Edema present.      Left lower  leg: Edema present.   Lymphadenopathy:      Cervical: No cervical adenopathy.   Skin:     General: Skin is warm and moist.      Capillary Refill: Capillary refill takes less than 2 seconds.      Coloration: Skin is pale.      Findings: Wound present.          Neurological:      Mental Status: She is alert and oriented to person, place, and time.   Psychiatric:      Comments: Lethargic, oriented x3         Results Review:  I have personally reviewed most recent cardiac tracings, lab results and radiology images and interpretations and agree with findings.    Results from last 7 days   Lab Units 10/09/20  1940   WBC 10*3/mm3 3.40   HEMOGLOBIN g/dL 9.5*   HEMATOCRIT % 29.1*   PLATELETS 10*3/mm3 138*     Results from last 7 days   Lab Units 10/09/20  1940   SODIUM mmol/L 138   POTASSIUM mmol/L 4.5   CHLORIDE mmol/L 103   CO2 mmol/L 23.0   BUN  30*   CREATININE mg/dL 1.31*   GLUCOSE mg/dL 122*   CALCIUM mg/dL 8.7   ALT (SGPT) U/L <5   AST (SGOT) U/L 19   PROCALCITONIN ng/mL 0.31*     Estimated Creatinine Clearance: 32.7 mL/min (A) (by C-G formula based on SCr of 1.31 mg/dL (H)).  Brief Urine Lab Results  (Last result in the past 365 days)      Color   Clarity   Blood   Leuk Est   Nitrite   Protein   CREAT   Urine HCG        10/09/20 1939 Yellow Cloudy  Comment:  Result checked  Trace Large (3+) Positive Negative               Microbiology Results (last 10 days)     Procedure Component Value - Date/Time    COVID-19,[facility] IN-HOUSE,Nasal Swab No Transport Media 3-4 HR TAT - Swab, Nasal Cavity [[id number]]  (Normal) Collected: 10/09/20 2010    Lab Status: Final result Specimen: Swab from Nasal Cavity Updated: 10/09/20 2056     COVID19 Not Detected    Narrative:      Fact sheet for providers: https://www.fda.gov/media/692038/download     Fact sheet for patients: https://www.fda.gov/media/047040/download          ECG/EMG Results (most recent)     None                    Xr Chest 1 View    Result Date:  10/9/2020  1.Atelectatic changes lower lungs. 2.Cardiomegaly  Electronically Signed By-Magdi Alex On:10/9/2020 8:16 PM This report was finalized on [id number] by  Magdi Alex, .        Estimated Creatinine Clearance: 32.7 mL/min (A) (by C-G formula based on SCr of 1.31 mg/dL (H)).    Assessment/Plan   Assessment/Plan       Active Hospital Problems    Diagnosis  POA   • Weakness [R53.1]  Yes     Priority: High   • Anemia [D64.9]  Yes     Priority: High   • Polyosteoarthritis [M15.9]  Yes     Priority: High   • Peripheral venous insufficiency [I87.2]  Yes     Priority: High   • Anxiety disorder [F41.9]  Yes     Priority: Medium   • Gastroesophageal reflux disease [K21.9]  Yes     Priority: Medium   • Fibromyalgia [M79.7]  Yes     Priority: Medium   • Wheelchair dependence [Z99.3]  Not Applicable     Priority: Medium   • Hyperlipidemia [E78.5]  Yes     Priority: Low   • Hypothyroidism [E03.9]  Yes     Priority: Low   • Hemorrhoids [K64.9]  Yes     Priority: Low      Resolved Hospital Problems   No resolved problems to display.     Sepsis, uncertain etiology with fever of 103.0: Add IV vancomycin,: IV cefepime; check respiratory viral panel; check pro calcitonin now and in a.m.    --Patient received influenza vaccine yesterday    --COVID 19 negative   --Received 1 dose of IV Rocephin in ER    --Blood cultures pending    UTI with possible Candida: Add IV Diflucan x1; continue cephalosporin    --Patient got IV Rocephin x1 in ER; patient be continued on cefepime for sepsis protocol    Acute renal injury, Creatinine 1.31 with comparison 1.20 10/8/2019 and 1.0 8/17/2018--likely secondary to pre-azotemia from dehydration related to insensible loss with fever:     1) IV fluids;     2) repeat BMP;    3) hold Lasix with potassium    4) hold Advil and other nephrotoxic medicines    --Note patient has history of bilateral lower extremity edema with venous stasis ulcers     --WBC too numoerous to cuat moderate buding  yeast    Anemia, chronic: Hemoglobin 9.5 with comparison 8.4-9.5 over the last 12 months, chronic: Repeat CBC; Hemoccult stool; continue ferrous sulfate    --note Hematuria    Heart murmur: Echocardiogram August 2018 showing mild aortic stenosis and mild aortic valve calcification with ejection fraction 55 to 60%: Hold Lasix    Gout, chronic: Continue Allopurinol    Anxiety with mood stabilizer: Continue Abilify; continue Sinemet; continue Cymbalta; hold Lyrica; hold Remeron; Hold Requip    Psoriatic arthritis and fibromyalgia with chronic pain: Hold Lyrica; hold Norco secondary to altered mental status; hold baclofen    HLD, chronic: Hold TriCor    Allergies, chronic: Hold Flonase as it is nonformulary; continue cetirizine; continue guaifenesin    Dietary supplementation: Hold cranberry; continue vitamin B12    GERD, chronic: Continue Nexium with formulary substitution; hold Pepcid    VTE Prophylaxis -   Mechanical Order History:     None      Pharmalogical Order History:     None          CODE STATUS:    Code Status and Medical Interventions:   Ordered at: 10/09/20 3305     Level Of Support Discussed With:    Patient     Code Status:    CPR     Medical Interventions (Level of Support Prior to Arrest):    Full       This patient has been examined wearing appropriate Personal Protective Equipment and COVID 19 is negative. 10/10/20      I discussed the patient's findings and my recommendations with patient and nursing staff.        Electronically signed by REKHA Barron, 10/09/20, 10:13 PM EDT.  [facility] Hospitalist Team

## 2020-10-10 NOTE — PLAN OF CARE
Problem: Adult Inpatient Plan of Care  Goal: Plan of Care Review  Outcome: Ongoing, Progressing  Flowsheets  Taken 10/10/2020 1630 by Claire Castillo RN  Progress: improving  Taken 10/10/2020 0309 by Louise Jerez RN  Plan of Care Reviewed With: patient  Goal: Patient-Specific Goal (Individualized)  Outcome: Ongoing, Progressing  Goal: Absence of Hospital-Acquired Illness or Injury  Outcome: Ongoing, Progressing  Intervention: Identify and Manage Fall Risk  Recent Flowsheet Documentation  Taken 10/10/2020 1531 by Claire Castillo RN  Safety Promotion/Fall Prevention:   assistive device/personal items within reach   fall prevention program maintained   nonskid shoes/slippers when out of bed   safety round/check completed  Taken 10/10/2020 1311 by Claire Castillo RN  Safety Promotion/Fall Prevention:   assistive device/personal items within reach   fall prevention program maintained   nonskid shoes/slippers when out of bed   safety round/check completed  Taken 10/10/2020 0918 by Claire Castillo RN  Safety Promotion/Fall Prevention:   assistive device/personal items within reach   fall prevention program maintained   nonskid shoes/slippers when out of bed   safety round/check completed  Taken 10/10/2020 0750 by Claire Castillo RN  Safety Promotion/Fall Prevention:   assistive device/personal items within reach   fall prevention program maintained   nonskid shoes/slippers when out of bed   safety round/check completed  Goal: Optimal Comfort and Wellbeing  Outcome: Ongoing, Progressing  Goal: Readiness for Transition of Care  Outcome: Ongoing, Progressing     Problem: Balance Impairment (Functional Deficit)  Goal: Improved Balance and Postural Control  Outcome: Ongoing, Progressing  Intervention: Optimize Balance and Safe Activity  Recent Flowsheet Documentation  Taken 10/10/2020 1531 by Claire Castillo RN  Safety Promotion/Fall Prevention:   assistive device/personal items within reach   fall prevention  program maintained   nonskid shoes/slippers when out of bed   safety round/check completed  Taken 10/10/2020 1311 by Claire Castillo RN  Safety Promotion/Fall Prevention:   assistive device/personal items within reach   fall prevention program maintained   nonskid shoes/slippers when out of bed   safety round/check completed  Taken 10/10/2020 0918 by Claire Castillo RN  Safety Promotion/Fall Prevention:   assistive device/personal items within reach   fall prevention program maintained   nonskid shoes/slippers when out of bed   safety round/check completed  Taken 10/10/2020 0750 by Claire Castillo RN  Safety Promotion/Fall Prevention:   assistive device/personal items within reach   fall prevention program maintained   nonskid shoes/slippers when out of bed   safety round/check completed     Problem: Cognitive Impairment (Functional Deficit)  Goal: Optimal Functional [place]  Outcome: Ongoing, Progressing     Problem: Coordination Impairment (Functional Deficit)  Goal: Optimal Coordination  Outcome: Ongoing, Progressing     Problem: Muscle Strength Impairment (Functional Deficit)  Goal: Improved Muscle Strength  Outcome: Ongoing, Progressing     Problem: Muscle Tone Impairment (Functional Deficit)  Goal: Improved Muscle Tone  Outcome: Ongoing, Progressing     Problem: Range of Motion Impairment (Functional Deficit)  Goal: Optimal Range of Motion  Outcome: Ongoing, Progressing     Problem: Sensory Impairment (Functional Deficit)  Goal: Compensation for Sensory Deficit  Outcome: Ongoing, Progressing     Problem: Fever  Goal: Body Temperature in Desired Range  Outcome: Ongoing, Progressing     Problem: Skin Injury Risk Increased  Goal: Skin Health and Integrity  Outcome: Ongoing, Progressing     Problem: Fall Injury Risk  Goal: Absence of Fall and Fall-Related Injury  Outcome: Ongoing, Progressing  Intervention: Promote Injury-Free Environment  Recent Flowsheet Documentation  Taken 10/10/2020 1531 by Anna  ALEX Rangel  Safety Promotion/Fall Prevention:   assistive device/personal items within reach   fall prevention program maintained   nonskid shoes/slippers when out of bed   safety round/check completed  Taken 10/10/2020 1311 by Claire Castillo RN  Safety Promotion/Fall Prevention:   assistive device/personal items within reach   fall prevention program maintained   nonskid shoes/slippers when out of bed   safety round/check completed  Taken 10/10/2020 0918 by Claire Castillo RN  Safety Promotion/Fall Prevention:   assistive device/personal items within [facility]   fall prevention program maintained   nonskid shoes/slippers when out of bed   safety round/check completed  Taken 10/10/2020 0750 by Claire Castillo RN  Safety Promotion/Fall Prevention:   assistive device/personal items within reach   fall prevention program maintained   nonskid shoes/slippers when out of bed   safety round/check completed   Goal Outcome Evaluation:  Plan of Care Reviewed With: patient  Progress: improving

## 2020-10-11 VITALS
BODY MASS INDEX: 39.27 KG/M2 | WEIGHT: 200 LBS | TEMPERATURE: 98.3 F | HEIGHT: 60 IN | HEART RATE: 85 BPM | DIASTOLIC BLOOD PRESSURE: 54 MMHG | OXYGEN SATURATION: 94 % | SYSTOLIC BLOOD PRESSURE: 104 MMHG | RESPIRATION RATE: 18 BRPM

## 2020-10-11 LAB
ANION GAP SERPL CALCULATED.3IONS-SCNC: 8 MMOL/L (ref 5–15)
BACTERIA SPEC AEROBE CULT: ABNORMAL
BASOPHILS # BLD AUTO: 0 10*3/MM3 (ref 0–0.2)
BASOPHILS NFR BLD AUTO: 0.7 % (ref 0–1.5)
BUN SERPL-MCNC: 24 MG/DL (ref 8–23)
BUN SERPL-MCNC: ABNORMAL MG/DL
BUN/CREAT SERPL: ABNORMAL
CALCIUM SPEC-SCNC: 8.4 MG/DL (ref 8.6–10.5)
CHLORIDE SERPL-SCNC: 110 MMOL/L (ref 98–107)
CO2 SERPL-SCNC: 23 MMOL/L (ref 22–29)
CREAT SERPL-MCNC: 0.92 MG/DL (ref 0.57–1)
DEPRECATED RDW RBC AUTO: 55.1 FL (ref 37–54)
EOSINOPHIL # BLD AUTO: 0.1 10*3/MM3 (ref 0–0.4)
EOSINOPHIL NFR BLD AUTO: 3.9 % (ref 0.3–6.2)
ERYTHROCYTE [DISTWIDTH] IN BLOOD BY AUTOMATED COUNT: 16.8 % (ref 12.3–15.4)
GFR SERPL CREATININE-BSD FRML MDRD: 58 ML/MIN/1.73
GLUCOSE SERPL-MCNC: 89 MG/DL (ref 65–99)
HCT VFR BLD AUTO: 26 % (ref 34–46.6)
HGB BLD-MCNC: 8.5 G/DL (ref 12–15.9)
LYMPHOCYTES # BLD AUTO: 0.5 10*3/MM3 (ref 0.7–3.1)
LYMPHOCYTES NFR BLD AUTO: 15.5 % (ref 19.6–45.3)
MAGNESIUM SERPL-MCNC: 2.2 MG/DL (ref 1.6–2.4)
MCH RBC QN AUTO: 30.4 PG (ref 26.6–33)
MCHC RBC AUTO-ENTMCNC: 32.7 G/DL (ref 31.5–35.7)
MCV RBC AUTO: 93.2 FL (ref 79–97)
MONOCYTES # BLD AUTO: 0.3 10*3/MM3 (ref 0.1–0.9)
MONOCYTES NFR BLD AUTO: 9.4 % (ref 5–12)
NEUTROPHILS NFR BLD AUTO: 2.1 10*3/MM3 (ref 1.7–7)
NEUTROPHILS NFR BLD AUTO: 70.5 % (ref 42.7–76)
NRBC BLD AUTO-RTO: 0.2 /100 WBC (ref 0–0.2)
PLATELET # BLD AUTO: 116 10*3/MM3 (ref 140–450)
PMV BLD AUTO: 7.7 FL (ref 6–12)
POTASSIUM SERPL-SCNC: 3.8 MMOL/L (ref 3.5–5.2)
RBC # BLD AUTO: 2.79 10*6/MM3 (ref 3.77–5.28)
SODIUM SERPL-SCNC: 141 MMOL/L (ref 136–145)
VANCOMYCIN SERPL-MCNC: 14.8 MCG/ML (ref 5–40)
WBC # BLD AUTO: 3 10*3/MM3 (ref 3.4–10.8)

## 2020-10-11 PROCEDURE — 80048 BASIC METABOLIC PNL TOTAL CA: CPT | Performed by: INTERNAL MEDICINE

## 2020-10-11 PROCEDURE — 25010000002 CEFEPIME PER 500 MG: Performed by: NURSE PRACTITIONER

## 2020-10-11 PROCEDURE — G0378 HOSPITAL OBSERVATION PER HR: HCPCS

## 2020-10-11 PROCEDURE — 85025 COMPLETE CBC W/AUTO DIFF WBC: CPT | Performed by: INTERNAL MEDICINE

## 2020-10-11 PROCEDURE — 80202 ASSAY OF VANCOMYCIN: CPT | Performed by: INTERNAL MEDICINE

## 2020-10-11 PROCEDURE — 99217 PR OBSERVATION CARE DISCHARGE MANAGEMENT: CPT | Performed by: INTERNAL MEDICINE

## 2020-10-11 PROCEDURE — 83735 ASSAY OF MAGNESIUM: CPT | Performed by: INTERNAL MEDICINE

## 2020-10-11 RX ORDER — CEFDINIR 300 MG/1
300 CAPSULE ORAL 2 TIMES DAILY
Qty: 11 CAPSULE | Refills: 0 | OUTPATIENT
Start: 2020-10-11 | End: 2021-03-25

## 2020-10-11 RX ADMIN — HYDROCODONE BITARTRATE AND ACETAMINOPHEN 1 TABLET: 7.5; 325 TABLET ORAL at 00:42

## 2020-10-11 RX ADMIN — ARIPIPRAZOLE 2 MG: 2 TABLET ORAL at 08:23

## 2020-10-11 RX ADMIN — FERROUS SULFATE TAB EC 324 MG (65 MG FE EQUIVALENT) 324 MG: 324 (65 FE) TABLET DELAYED RESPONSE at 08:23

## 2020-10-11 RX ADMIN — BACLOFEN 5 MG: 10 TABLET ORAL at 08:24

## 2020-10-11 RX ADMIN — OXYCODONE HYDROCHLORIDE AND ACETAMINOPHEN 500 MG: 500 TABLET ORAL at 08:24

## 2020-10-11 RX ADMIN — LEVOTHYROXINE SODIUM 137 MCG: 25 TABLET ORAL at 05:36

## 2020-10-11 RX ADMIN — PANTOPRAZOLE SODIUM 40 MG: 40 TABLET, DELAYED RELEASE ORAL at 08:23

## 2020-10-11 RX ADMIN — DULOXETINE HYDROCHLORIDE 60 MG: 30 CAPSULE, DELAYED RELEASE ORAL at 08:23

## 2020-10-11 RX ADMIN — Medication 1000 UNITS: at 08:23

## 2020-10-11 RX ADMIN — CARBIDOPA AND LEVODOPA 2 TABLET: 25; 100 TABLET ORAL at 08:23

## 2020-10-11 RX ADMIN — ALLOPURINOL 100 MG: 100 TABLET ORAL at 08:24

## 2020-10-11 RX ADMIN — HYDROCODONE BITARTRATE AND ACETAMINOPHEN 1 TABLET: 7.5; 325 TABLET ORAL at 08:23

## 2020-10-11 RX ADMIN — CEFEPIME HYDROCHLORIDE 2 G: 2 INJECTION, POWDER, FOR SOLUTION INTRAVENOUS at 03:06

## 2020-10-11 RX ADMIN — CETIRIZINE HYDROCHLORIDE 10 MG: 10 TABLET, FILM COATED ORAL at 08:23

## 2020-10-11 NOTE — PLAN OF CARE
Problem: Adult Inpatient Plan of Care  Goal: Plan of Care Review  10/11/2020 1338 by Claire Castillo RN  Outcome: Met  10/11/2020 1338 by Claire Castillo RN  Outcome: Ongoing, Progressing  Flowsheets  Taken 10/11/2020 1338 by Claire Castillo RN  Progress: improving  Taken 10/11/2020 0210 by Louise Jerez RN  Plan of Care Reviewed With: patient  Goal: Patient-Specific Goal (Individualized)  10/11/2020 1338 by Claire Castillo RN  Outcome: Met  10/11/2020 1338 by Claire Castillo RN  Outcome: Ongoing, Progressing  Goal: Absence of Hospital-Acquired Illness or Injury  10/11/2020 1338 by Claire Castillo RN  Outcome: Met  10/11/2020 1338 by Claire Castillo RN  Outcome: Ongoing, Progressing  Intervention: Identify and Manage Fall Risk  Recent Flowsheet Documentation  Taken 10/11/2020 0915 by Claire Castillo RN  Safety Promotion/Fall Prevention:   assistive device/personal items within reach   fall prevention program maintained   nonskid shoes/slippers when out of bed   safety round/check completed  Taken 10/11/2020 0721 by Claire Castillo RN  Safety Promotion/Fall Prevention:   assistive device/personal items within reach   fall prevention program maintained   nonskid shoes/slippers when out of bed   safety round/check completed  Goal: Optimal Comfort and Wellbeing  10/11/2020 1338 by Claire Castillo RN  Outcome: Met  10/11/2020 1338 by Claire Castillo RN  Outcome: Ongoing, Progressing  Goal: Readiness for Transition of Care  10/11/2020 1338 by Claire Castillo RN  Outcome: Met  10/11/2020 1338 by Claire Castillo RN  Outcome: Ongoing, Progressing     Problem: Balance Impairment (Functional Deficit)  Goal: Improved Balance and Postural Control  10/11/2020 1338 by Claire Castillo RN  Outcome: Met  10/11/2020 1338 by Claire Castillo RN  Outcome: Ongoing, Progressing  Intervention: Optimize Balance and Safe Activity  Recent Flowsheet Documentation  Taken 10/11/2020 0915 by Claire Castillo RN  Safety  Promotion/Fall Prevention:   assistive device/personal items within reach   fall prevention program maintained   nonskid shoes/slippers when out of bed   safety round/check completed  Taken 10/11/2020 0721 by Claire Castillo RN  Safety Promotion/Fall Prevention:   assistive device/personal items within reach   fall prevention program maintained   nonskid shoes/slippers when out of bed   safety round/check completed     Problem: Cognitive Impairment (Functional Deficit)  Goal: Optimal Functional Dixon  10/11/2020 1338 by Claire Castillo RN  Outcome: Met  10/11/2020 1338 by Claire Castillo RN  Outcome: Ongoing, Progressing     Problem: Coordination Impairment (Functional Deficit)  Goal: Optimal Coordination  10/11/2020 1338 by Claire Castillo RN  Outcome: Met  10/11/2020 1338 by Claire Castillo RN  Outcome: Ongoing, Progressing     Problem: Muscle Strength Impairment (Functional Deficit)  Goal: Improved Muscle Strength  10/11/2020 1338 by Claire Castillo RN  Outcome: Met  10/11/2020 1338 by Claire Castillo RN  Outcome: Ongoing, Progressing     Problem: Muscle Tone Impairment (Functional Deficit)  Goal: Improved Muscle Tone  10/11/2020 1338 by Claire Castillo RN  Outcome: Met  10/11/2020 1338 by Claire Castillo RN  Outcome: Ongoing, Progressing     Problem: Range of Motion Impairment (Functional Deficit)  Goal: Optimal Range of Motion  10/11/2020 1338 by Claire Castillo RN  Outcome: Met  10/11/2020 1338 by Claire Castillo RN  Outcome: Ongoing, Progressing     Problem: Fever  Goal: Body Temperature in Desired Range  10/11/2020 1338 by Claire Castillo RN  Outcome: Met  10/11/2020 1338 by Claire Castillo RN  Outcome: Ongoing, Progressing     Problem: Skin Injury Risk Increased  Goal: Skin Health and Integrity  10/11/2020 1338 by Claire Castillo RN  Outcome: Met  10/11/2020 1338 by Claire Castillo RN  Outcome: Ongoing, Progressing  Intervention: Optimize Skin Protection  Recent Flowsheet  Documentation  Taken 10/11/2020 0721 by Claire Castillo RN  Pressure Reduction Techniques: frequent weight shift encouraged     Problem: Fall Injury Risk  Goal: Absence of Fall and Fall-Related Injury  10/11/2020 1338 by Claire Castillo RN  Outcome: Met  10/11/2020 1338 by Claire Castillo RN  Outcome: Ongoing, Progressing  Intervention: Promote Injury-Free Environment  Recent Flowsheet Documentation  Taken 10/11/2020 0915 by Claire Castillo RN  Safety Promotion/Fall Prevention:   assistive device/personal items within reach   fall prevention program maintained   nonskid shoes/slippers when out of bed   safety round/check completed  Taken 10/11/2020 0721 by Claire Castillo RN  Safety Promotion/Fall Prevention:   assistive device/personal items within reach   fall prevention program maintained   nonskid shoes/slippers when out of bed   safety round/check completed   Goal Outcome Evaluation:  Plan of Care Reviewed With: patient  Progress: improving

## 2020-10-11 NOTE — PROGRESS NOTES
"Pharmacy Antimicrobial Dosing Service    Subjective:  Lorenza Owens is a 85 y.o.female admitted from [facility] with increased weakness and chills, found to have possible UTI and sepsis. Pharmacy has been consulted to dose Vancomycin for possible sepsis.    10/9 Urine Cx showing >100k CFUs gram - rods (P). Recommend discontinuing vancomycin if no other source of infection suspected.    PMH: CKDIII      Assessment/Plan    1. Day #2 Vancomycin: Pulse dosing d/t renal dysfxn. Patient received 1250mg (~19 mg/kg DBW) IV x1 dose yesterday morning and random level obtained ~11 hours later resulted at 9 mcg/mL. Patient was then given 1000 mg (~16 mg/kg DBW) IV x1. Random level obtained this morning ~12 hours following the dose resulted at 14.8 mcg/mL. Will give pt 1250 mg IV x1 today and obtain a random level tomorrow with am labs. If renal Fx remains stable, consider placing pt on scheduled regimen.    2. Day #2 Cefepime: 2g IV q12h for est CrCl 30-59 mL/min.    Will continue to monitor drug levels, renal function, culture and sensitivities, and patient clinical status.       Objective:  Relevant clinical data and objective history reviewed:  152.4 cm (60\")   90.7 kg (200 lb)   Ideal body weight: 45.5 kg (100 lb 4.9 oz)  Adjusted ideal body weight: 63.6 kg (140 lb 3 oz)  Body mass index is 39.06 kg/m².    Results from last 7 days   Lab Units 10/11/20  0529 10/10/20  1254   VANCOMYCIN RM mcg/mL 14.80 9.00     Results from last 7 days   Lab Units 10/11/20  0529 10/10/20  0405 10/09/20  1940   CREATININE mg/dL 0.92 1.20* 1.31*     Estimated Creatinine Clearance: 46.5 mL/min (by C-G formula based on SCr of 0.92 mg/dL).  I/O last 3 completed shifts:  In: 5070 [P.O.:1320; I.V.:3500; IV Piggyback:250]  Out: -     Results from last 7 days   Lab Units 10/11/20  0529 10/10/20  0405 10/09/20  1940   WBC 10*3/mm3 3.00* 2.50* 3.40     Temperature    10/10/20 1144 10/10/20 1916 10/11/20 0302   Temp: 98.2 °F (36.8 °C) 98.5 °F (36.9 °C) 98.6 " °F (37 °C)     Baseline culture/source/susceptibility:  Microbiology Results (last 10 days)       Procedure Component Value - Date/Time    Respiratory Panel, PCR - Swab, Nasopharynx [[id number]]  (Normal) Collected: 10/10/20 0146    Lab Status: Final result Specimen: Swab from Nasopharynx Updated: 10/10/20 0330     ADENOVIRUS, PCR Not Detected     Coronavirus 229E Not Detected     Coronavirus HKU1 Not Detected     Coronavirus NL63 Not Detected     Coronavirus OC43 Not Detected     Human Metapneumovirus Not Detected     Human Rhinovirus/Enterovirus Not Detected     Influenza B PCR Not Detected     Parainfluenza Virus 1 Not Detected     Parainfluenza Virus 2 Not Detected     Parainfluenza Virus 3 Not Detected     Parainfluenza Virus 4 Not Detected     Bordetella pertussis pcr Not Detected     Influenza A H1 2009 PCR Not Detected     Chlamydophila pneumoniae PCR Not Detected     Mycoplasma pneumo by PCR Not Detected     Influenza A PCR Not Detected     Influenza A H3 Not Detected     Influenza A H1 Not Detected     RSV, PCR Not Detected     Bordetella parapertussis PCR Not Detected    Narrative:      The coronavirus on the RVP is NOT COVID-19 and is NOT indicative of infection with COVID-19.     Blood Culture - Blood, Arm, Right [[id number]] Collected: 10/09/20 2128    Lab Status: Preliminary result Specimen: Blood from Arm, Right Updated: 10/10/20 2215     Blood Culture No growth at 24 hours    COVID-19,[facility]HOU[facility],Nasal Swab No Transport Media 3-4 HR TAT - Swab, Nasal Cavity [[id number]]  (Normal) Collected: 10/09/20 2010    Lab Status: Final result Specimen: Swab from Nasal Cavity Updated: 10/09/20 2056     COVID19 Not Detected    Narrative:      Fact sheet for providers: https://www.fda.gov/media/894377/download     Fact sheet for patients: https://www.fda.gov/media/991511/download    Blood Culture - Blood, Blood, Venous Line [[id number]] Collected: 10/09/20 1940    Lab Status: Preliminary result Specimen: Blood,  Venous Line Updated: 10/10/20 2000     Blood Culture No growth at 24 hours    Urine Culture - Urine, Urine, Catheter [[id number]]  (Abnormal) Collected: 10/09/20 1939    Lab Status: Preliminary result Specimen: Urine, Catheter Updated: 10/11/20 0404     Urine Culture >100,000 CFU/mL Gram Negative Bacilli             Anti-Infectives (From admission, onward)      Ordered     Dose/Rate Route Frequency Start Stop    10/10/20 1501  !Vancomycin Level Draw Needed     Ordering Provider: Niles Montanez DO     Does not apply Once 10/11/20 0600      10/10/20 0221  cefepime 2 gm IVPB in 100 ml NS (MBP)     Ordering Provider: Mily Tillman FNP    2 g  over 4 Hours Intravenous Every 12 Hours 10/10/20 1600 10/17/20 1559    10/10/20 1501  vancomycin (VANCOCIN) 1,000 mg in sodium chloride 0.9 % 250 mL IVPB     Ordering Provider: Niles Montanez DO    1,000 mg  over 60 Minutes Intravenous Once 10/10/20 1600 10/10/20 1853    10/10/20 0117  !Vancomycin Level Draw Needed     Ordering Provider: Mily Tillman FNP     Does not apply Once 10/10/20 1300 10/10/20 1421    10/10/20 0221  cefepime 2 gm IVPB in 100 ml NS (MBP)     Ordering Provider: Mily Tillman FNP    2 g  over 30 Minutes Intravenous Once 10/10/20 0400 10/10/20 0521    10/10/20 0117  vancomycin 1250 mg/250 mL 0.9% NS IVPB ([facility])     Ordering Provider: Mily Tillman FNP    1,250 mg Intravenous Once 10/10/20 0215 10/10/20 0219    10/10/20 0211  Pharmacy to Dose Cefepime     Ordering Provider: Mily Tillman FNP     Does not apply Continuous PRN 10/10/20 0211 10/17/20 0210    10/10/20 0117  vancomycin (VANCOCIN) 1,000 mg in sodium chloride 0.9 % 250 mL IVPB     Ordering Provider: Mily Tillman FNP    1,000 mg Intravenous As Needed 10/10/20 0116 10/17/20 0115    10/10/20 0104  Pharmacy to dose vancomycin     Ordering Provider: Mily Tillman FNP     Does not apply Continuous PRN 10/10/20 0104 10/17/20 0103    10/09/20 2210  fluconazole (DIFLUCAN) IVPB 200 mg     Ordering Provider: Primo  REKHA Minor    200 mg  100 mL/hr over 60 Minutes Intravenous Once 10/09/20 2212 10/10/20 0232    10/09/20 2101  cefTRIAXone (ROCEPHIN) in SWFI 1 gram/10ml IV PUSH syringe     Ordering Provider: Thierno Whitaker MD    1 g  over 5 Minutes Intravenous Once 10/09/20 2103 10/09/20 2133            Britt Aguilar, Eva  10/11/20 10:16 EDT   Patient/Caregiver provided printed discharge information.

## 2020-10-11 NOTE — PLAN OF CARE
Problem: Adult Inpatient Plan of Care  Goal: Plan of Care Review  10/11/2020 0210 by Louise Jerez RN  Outcome: Ongoing, Progressing  Flowsheets (Taken 10/11/2020 0210)  Outcome Summary: Vitals stable, receiving IV fluids and antibiotics.  No fever overnight.   Goal Outcome Evaluation:  Plan of Care Reviewed With: patient  Progress: improving  Outcome Summary: Vitals stable, receiving IV fluids and antibiotics.  No fever overnight.

## 2020-10-11 NOTE — DISCHARGE SUMMARY
Date of Admission: 10/9/2020    Date of Discharge:  10/11/2020    Length of stay:  LOS: 0 days     Admission Diagnosis:   Weakness [R53.1]  Fever, unspecified fever cause [R50.9]      Discharge Diagnosis:     Sepsis d/t UTI with fever of 103.0  - discontinue IV Vancomycin and cefepime   - Blood cx- NGTD  - viral panel- negative  - COVID 19 negative  - Urine cx- GNR  - continue omnicef for a total of 7 days of antibiotics      Acute UTI d/t GNR with possible Candida- POA  - s/p Diflucan x 1  - continue Omnicef      CKD III  - baseline Cr ~1-1.2     Anemia, chronic  - Hgb stable   - continue ferrous sulfate     Aortic Stenosis- mild  - echo 8/2018- reviewed      Gout, chronic  - Continue Allopurinol     Anxiety with mood stabilizer  - Continue Abilify, Lyrics, Remeron, Requip, Sinemet, Cymbalta  - recommend decreasing medications if possible      Psoriatic arthritis and fibromyalgia with chronic pain  - resume home meds      hypertriglyceridemia, chronic  - resume TriCor     Allergies, chronic  - Hold Flonase as it is non-formulary  - continue cetirizine, guaifenesin, Flonase     Dietary supplementation  - continue vitamin B12 and cranberry      GERD, chronic  - Continue PPI  - hold pepcid       Active Hospital Problems    Diagnosis  POA   • Weakness [R53.1]  Yes   • Anxiety disorder [F41.9]  Yes   • Gastroesophageal reflux disease [K21.9]  Yes   • Fibromyalgia [M79.7]  Yes   • Hyperlipidemia [E78.5]  Yes   • Hypothyroidism [E03.9]  Yes   • Wheelchair dependence [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       Hospital Course:  Patient is a 85 y.o. female presented to [facility] complaining of generalized weakness and fever. She was found to have a UTI. She was initially started on Vancomycin and Cefepime with improvement  in her symptoms. She will be discharged on Omnicef back to [facility] assisted living [facility].  She will be discharged home in stable condition.        Procedures Performed:  None          Consults:   Consults     Date and Time Order Name Status Description    10/9/2020 2101 Hospitalist (on-call MD unless specified) Completed           Vital Signs:  Temp:  [98.3 °F (36.8 °C)-98.6 °F (37 °C)] 98.3 °F (36.8 °C)  Heart Rate:  [85-88] 85  Resp:  [16-18] 18  BP: (102-119)/(41-54) 104/54        Physical Exam:  Physical Exam  Vitals signs reviewed.   Constitutional:       General: She is not in acute distress.     Appearance: She is obese.   HENT:      Head: Normocephalic and atraumatic.      Mouth/Throat:      Mouth: Mucous membranes are moist.   Cardiovascular:      Rate and Rhythm: Normal rate and regular rhythm.   Pulmonary:      Effort: Pulmonary effort is normal. No respiratory distress.   Abdominal:      General: Bowel sounds are normal. There is no distension.      Tenderness: There is no abdominal tenderness.   Neurological:      Mental Status: She is alert.   Psychiatric:         Mood and Affect: Mood normal.           Pertinent Test Results:   Lab Results (last 72 hours)     Procedure Component Value Units Date/Time    BUN [[id number]]  (Abnormal) Collected: 10/11/20 0529    Specimen: Blood Updated: 10/11/20 1135     BUN 24 mg/dL     Vancomycin, Random [[id number]]  (Normal) Collected: 10/11/20 0529    Specimen: Blood Updated: 10/11/20 0650     Vancomycin Random 14.80 mcg/mL     Basic Metabolic Panel [[id number]]  (Abnormal) Collected: 10/11/20 0529    Specimen: Blood Updated: 10/11/20 0648     Glucose 89 mg/dL      BUN --     Comment: Testing performed by alternate method        Creatinine 0.92 mg/dL      Sodium 141 mmol/L      Potassium 3.8 mmol/L      Chloride 110 mmol/L      CO2 23.0 mmol/L      Calcium 8.4 mg/dL      eGFR Non African Amer 58 mL/min/1.73      BUN/Creatinine Ratio --     Comment: Testing not  performed        Anion Gap 8.0 mmol/L     Narrative:      GFR Normal >60  Chronic Kidney Disease <60  Kidney Failure <15      Magnesium [[id number]]  (Normal) Collected: 10/11/20 0529    Specimen: Blood Updated: 10/11/20 0648     Magnesium 2.2 mg/dL     CBC & Differential [[id number]]  (Abnormal) Collected: 10/11/20 0529    Specimen: Blood Updated: 10/11/20 0628    Narrative:      The following orders were created for panel order CBC & Differential.  Procedure                               Abnormality         Status                     ---------                               -----------         ------                     CBC Auto Differential[[id number]]        Abnormal            Final result                 Please view results for these tests on the individual orders.    CBC Auto Differential [[id number]]  (Abnormal) Collected: 10/11/20 0529    Specimen: Blood Updated: 10/11/20 0628     WBC 3.00 10*3/mm3      RBC 2.79 10*6/mm3      Hemoglobin 8.5 g/dL      Hematocrit 26.0 %      MCV 93.2 fL      MCH 30.4 pg      MCHC 32.7 g/dL      RDW 16.8 %      RDW-SD 55.1 fl      MPV 7.7 fL      Platelets 116 10*3/mm3      Neutrophil % 70.5 %      Lymphocyte % 15.5 %      Monocyte % 9.4 %      Eosinophil % 3.9 %      Basophil % 0.7 %      Neutrophils, Absolute 2.10 10*3/mm3      Lymphocytes, Absolute 0.50 10*3/mm3      Monocytes, Absolute 0.30 10*3/mm3      Eosinophils, Absolute 0.10 10*3/mm3      Basophils, Absolute 0.00 10*3/mm3      nRBC 0.2 /100 WBC     Urine Culture - Urine, Urine, Catheter [[id number]]  (Abnormal) Collected: 10/09/20 1939    Specimen: Urine, Catheter Updated: 10/11/20 0404     Urine Culture >100,000 CFU/mL Gram Negative Bacilli    Blood Culture - Blood, Arm, Right [[id number]] Collected: 10/09/20 2128    Specimen: Blood from Arm, Right Updated: 10/10/20 2215     Blood Culture No growth at 24 hours    Blood Culture - Blood, Blood, Venous Line [[id number]] Collected: 10/09/20 1940    Specimen: Blood, Venous Line  "Updated: 10/10/20 2000     Blood Culture No growth at 24 hours    Vancomycin, Random [[id number]]  (Normal) Collected: 10/10/20 1254    Specimen: Blood Updated: 10/10/20 1339     Vancomycin Random 9.00 mcg/mL     BUN [[id number]]  (Abnormal) Collected: 10/10/20 0405    Specimen: Blood Updated: 10/10/20 0720     BUN 28 mg/dL     Procalcitonin [[id number]]  (Normal) Collected: 10/10/20 0405    Specimen: Blood Updated: 10/10/20 0539     Procalcitonin 0.24 ng/mL     Narrative:      As a Marker for Sepsis (Non-Neonates):   1. <0.5 ng/mL represents a low risk of severe sepsis and/or septic shock.  1. >2 ng/mL represents a high risk of severe sepsis and/or septic shock.    As a Marker for Lower Respiratory Tract Infections that require antibiotic therapy:  PCT on Admission     Antibiotic Therapy             6-12 Hrs later  > 0.5                Strongly Recommended            >0.25 - <0.5         Recommended  0.1 - 0.25           Discouraged                   Remeasure/reassess PCT  <0.1                 Strongly Discouraged          Remeasure/reassess PCT      As 28 day mortality risk marker: \"Change in Procalcitonin Result\" (> 80 % or <=80 %) if Day 0 (or Day 1) and Day 4 values are available. Refer to http://www.[facility]-pct-calculator.com/   Change in PCT <=80 %   A decrease of PCT levels below or equal to 80 % defines a positive change in PCT test result representing a higher risk for 28-day all-cause mortality of patients diagnosed with severe sepsis or septic shock.  Change in PCT > 80 %   A decrease of PCT levels of more than 80 % defines a negative change in PCT result representing a lower risk for 28-day all-cause mortality of patients diagnosed with severe sepsis or septic shock.                Results may be falsely decreased if patient taking Biotin.     Basic Metabolic Panel [[id number]]  (Abnormal) Collected: 10/10/20 0405    Specimen: Blood Updated: 10/10/20 0532     Glucose 157 mg/dL      BUN --     Comment: " Testing performed by alternate method        Creatinine 1.20 mg/dL      Sodium 139 mmol/L      Potassium 3.7 mmol/L      Comment: Slight hemolysis detected by analyzer. Results may be affected.        Chloride 108 mmol/L      CO2 22.0 mmol/L      Calcium 8.0 mg/dL      eGFR Non African Amer 43 mL/min/1.73      BUN/Creatinine Ratio --     Comment: Testing not performed        Anion Gap 9.0 mmol/L     Narrative:      GFR Normal >60  Chronic Kidney Disease <60  Kidney Failure <15      CBC (No Diff) [[id number]]  (Abnormal) Collected: 10/10/20 0405    Specimen: Blood Updated: 10/10/20 0512     WBC 2.50 10*3/mm3      RBC 2.53 10*6/mm3      Hemoglobin 7.6 g/dL      Hematocrit 23.6 %      MCV 93.0 fL      MCH 30.0 pg      MCHC 32.3 g/dL      RDW 17.2 %      RDW-SD 56.9 fl      MPV 7.4 fL      Platelets 107 10*3/mm3     Respiratory Panel, PCR - Swab, Nasopharynx [[id number]]  (Normal) Collected: 10/10/20 0146    Specimen: Swab from Nasopharynx Updated: 10/10/20 0330     ADENOVIRUS, PCR Not Detected     Coronavirus 229E Not Detected     Coronavirus HKU1 Not Detected     Coronavirus NL63 Not Detected     Coronavirus OC43 Not Detected     Human Metapneumovirus Not Detected     Human Rhinovirus/Enterovirus Not Detected     Influenza B PCR Not Detected     Parainfluenza Virus 1 Not Detected     Parainfluenza Virus 2 Not Detected     Parainfluenza Virus 3 Not Detected     Parainfluenza Virus 4 Not Detected     Bordetella pertussis pcr Not Detected     Influenza A H1 2009 PCR Not Detected     Chlamydophila pneumoniae PCR Not Detected     Mycoplasma pneumo by PCR Not Detected     Influenza A PCR Not Detected     Influenza A H3 Not Detected     Influenza A H1 Not Detected     RSV, PCR Not Detected     Bordetella parapertussis PCR Not Detected    Narrative:      The coronavirus on the RVP is NOT COVID-19 and is NOT indicative of infection with COVID-19.     Procalcitonin [[id number]]  (Abnormal) Collected: 10/09/20 1940    Specimen:  "Blood Updated: 10/10/20 0235     Procalcitonin 0.31 ng/mL     Narrative:      As a Marker for Sepsis (Non-Neonates):   1. <0.5 ng/mL represents a low risk of severe sepsis and/or septic shock.  1. >2 ng/mL represents a high risk of severe sepsis and/or septic shock.    As a Marker for Lower Respiratory Tract Infections that require antibiotic therapy:  PCT on Admission     Antibiotic Therapy             6-12 Hrs later  > 0.5                Strongly Recommended            >0.25 - <0.5         Recommended  0.1 - 0.25           Discouraged                   Remeasure/reassess PCT  <0.1                 Strongly Discouraged          Remeasure/reassess PCT      As 28 day mortality risk marker: \"Change in Procalcitonin Result\" (> 80 % or <=80 %) if Day 0 (or Day 1) and Day 4 values are available. Refer to http://www.[employer]-calculator.[employer]/   Change in PCT <=80 %   A decrease of PCT levels below or equal to 80 % defines a positive change in PCT test result representing a higher risk for 28-day all-cause mortality of patients diagnosed with severe sepsis or septic shock.  Change in PCT > 80 %   A decrease of PCT levels of more than 80 % defines a negative change in PCT result representing a lower risk for 28-day all-cause mortality of patients diagnosed with severe sepsis or septic shock.                Results may be falsely decreased if patient taking Biotin.     TSH [[id number]]  (Normal) Collected: 10/09/20 1940    Specimen: Blood Updated: 10/10/20 0149     TSH 1.080 uIU/mL     COVID-19,[facility] IN-HOUSE,Nasal Swab No Transport Media 3-4 HR TAT - Swab, Nasal Cavity [[id number]]  (Normal) Collected: 10/09/20 2010    Specimen: Swab from Nasal Cavity Updated: 10/09/20 2056     COVID19 Not Detected    Narrative:      Fact sheet for providers: https://www.fda.gov/media/446385/download     Fact sheet for patients: https://www.fda.gov/media/983268/download    BUN [[id number]]  (Abnormal) Collected: 10/09/20 1940    Specimen: " Blood Updated: 10/09/20 2038     BUN 30 mg/dL     Urinalysis, Microscopic Only - Urine, Catheter [[id number]]  (Abnormal) Collected: 10/09/20 1939    Specimen: Urine, Catheter Updated: 10/09/20 2035     RBC, UA 3-5 /HPF      WBC, UA Too Numerous to Count /HPF      Bacteria, UA None Seen /HPF      Squamous Epithelial Cells, UA None Seen /HPF      Yeast, UA       Moderate/2+ Budding Yeast w/Hyphae     /HPF     Granular Casts, UA 3-6 /LPF      Methodology Manual Light Microscopy    Comprehensive Metabolic Panel [[id number]]  (Abnormal) Collected: 10/09/20 1940    Specimen: Blood Updated: 10/09/20 2024     Glucose 122 mg/dL      BUN --     Comment: Testing performed by alternate method        Creatinine 1.31 mg/dL      Sodium 138 mmol/L      Potassium 4.5 mmol/L      Chloride 103 mmol/L      CO2 23.0 mmol/L      Calcium 8.7 mg/dL      Total Protein 7.4 g/dL      Albumin 3.70 g/dL      ALT (SGPT) <5 U/L      AST (SGOT) 19 U/L      Alkaline Phosphatase 74 U/L      Total Bilirubin 0.5 mg/dL      eGFR Non African Amer 39 mL/min/1.73      Globulin 3.7 gm/dL      A/G Ratio 1.0 g/dL      BUN/Creatinine Ratio --     Comment: Testing not performed        Anion Gap 12.0 mmol/L     Narrative:      GFR Normal >60  Chronic Kidney Disease <60  Kidney Failure <15      Urinalysis With Culture If Indicated - Urine, Catheter [[id number]]  (Abnormal) Collected: 10/09/20 1939    Specimen: Urine, Catheter Updated: 10/09/20 2003     Color, UA Yellow     Appearance, UA Cloudy     Comment: Result checked         pH, UA 6.5     Specific Gravity, UA 1.020     Glucose, UA Negative     Ketones, UA Negative     Bilirubin, UA Negative     Blood, UA Trace     Protein, UA Negative     Leuk Esterase, UA Large (3+)     Nitrite, UA Positive     Urobilinogen, UA 1.0 E.U./dL    Extra Tubes [[id number]] Collected: 10/09/20 1749    Specimen: Blood, Venous Line Updated: 10/09/20 2000    Narrative:      The following orders were created for panel order Extra  Tubes.  Procedure                               Abnormality         Status                     ---------                               -----------         ------                     Light Blue Top[[id number]]                                   Final result                 Please view results for these tests on the individual orders.    Light Blue Top [[id number]] Collected: 10/09/20 1749    Specimen: Blood Updated: 10/09/20 2000     Extra Tube hold for add-on     Comment: Auto resulted       CBC & Differential [[id number]]  (Abnormal) Collected: 10/09/20 1940    Specimen: Blood Updated: 10/09/20 1954    Narrative:      The following orders were created for panel order CBC & Differential.  Procedure                               Abnormality         Status                     ---------                               -----------         ------                     CBC Auto Differential[[id number]]        Abnormal            Final result                 Please view results for these tests on the individual orders.    CBC Auto Differential [[id number]]  (Abnormal) Collected: 10/09/20 1940    Specimen: Blood Updated: 10/09/20 1954     WBC 3.40 10*3/mm3      RBC 3.15 10*6/mm3      Hemoglobin 9.5 g/dL      Hematocrit 29.1 %      MCV 92.3 fL      MCH 30.1 pg      MCHC 32.6 g/dL      RDW 17.0 %      RDW-SD 55.6 fl      MPV 7.0 fL      Platelets 138 10*3/mm3      Neutrophil % 77.5 %      Lymphocyte % 8.8 %      Monocyte % 12.5 %      Eosinophil % 0.6 %      Basophil % 0.6 %      Neutrophils, Absolute 2.60 10*3/mm3      Lymphocytes, Absolute 0.30 10*3/mm3      Monocytes, Absolute 0.40 10*3/mm3      Eosinophils, Absolute 0.00 10*3/mm3      Basophils, Absolute 0.00 10*3/mm3      nRBC 0.2 /100 WBC                Blood Culture   Date Value Ref Range Status   10/09/2020 No growth at 24 hours  Preliminary   10/09/2020 No growth at 24 hours  Preliminary        Urine Culture   Date Value Ref Range Status   10/09/2020 >100,000 CFU/mL Gram  Negative Bacilli (A)  Preliminary         Imaging Results (All)     Procedure Component Value Units Date/Time    XR Chest 1 View [[id number]] Collected: 10/09/20 2011     Updated: 10/09/20 2018    Narrative:      DATE OF EXAM:  10/9/2020 8:06 PM     PROCEDURE:  XR CHEST 1 VW-     INDICATIONS:  Fever     COMPARISON:  08/11/2018     TECHNIQUE:   Single radiographic AP view of the chest was obtained.     FINDINGS:  The heart is enlarged. It looks like there some ectasia of the thoracic  aorta. The depth of inspiration is poor. It looks like there is some  bibasilar atelectasis. There are no pleural effusions. The patient had  previous cervical spine surgery. The patient also had left shoulder  surgery. There are degenerative changes involving the shoulders.        Impression:      1.Atelectatic changes lower lungs.  2.Cardiomegaly     Electronically Signed By-Magdi Alex On:10/9/2020 8:16 PM  This report was finalized on [id number] by  Magdi Alex, .                Discharge Disposition:  Home or Self Care    Discharge Medications:     Discharge Medications      New Medications      Instructions Start Date   cefdinir 300 MG capsule  Commonly known as: OMNICEF   300 mg, Oral, 2 Times Daily         Continue These Medications      Instructions Start Date   acetaminophen 325 MG tablet  Commonly known as: TYLENOL   650 mg, Oral, Every 8 Hours PRN      albuterol sulfate  (90 Base) MCG/ACT inhaler  Commonly known as: PROVENTIL HFA;VENTOLIN HFA;PROAIR HFA   2 puffs, Inhalation, Every 4 Hours PRN      albuterol 0.63 MG/3ML nebulizer solution  Commonly known as: ACCUNEB   1 ampule, Nebulization, Every 6 Hours PRN      allopurinol 100 MG tablet  Commonly known as: ZYLOPRIM   100 mg, Oral, Daily      ARIPiprazole 2 MG tablet  Commonly known as: ABILIFY   2 mg, Oral, Daily      baclofen 10 MG tablet  Commonly known as: LIORESAL   5 mg, Oral, 2 Times Daily      cetirizine 10 MG tablet  Commonly known as: zyrTEC   10 mg,  Oral, Daily      Cranberry 500 MG tablet   500 mg, Oral, 2 times daily      cyanocobalamin 1000 MCG/ML injection   1,000 mcg, Intramuscular, Every 28 Days, 11th each month       CYMBALTA PO   60 mg, Oral, Daily      esomeprazole 40 MG capsule  Commonly known as: nexIUM   40 mg, Oral, 2 times daily      fenofibrate 145 MG tablet  Commonly known as: TRICOR   145 mg, Oral, Daily      ferrous sulfate 325 (65 FE) MG tablet   325 mg, Oral, Daily      fluticasone 50 MCG/ACT nasal spray  Commonly known as: FLONASE   1 spray, Nasal, Daily      furosemide 20 MG tablet  Commonly known as: LASIX   20 mg, Oral, Daily      guaiFENesin 100 MG/5ML syrup  Commonly known as: ROBITUSSIN   600 mg, Oral, Every 6 Hours PRN      HYDROcodone-acetaminophen 7.5-325 MG per tablet  Commonly known as: NORCO   1 tablet, Oral, Every 6 Hours PRN      ibuprofen 400 MG tablet  Commonly known as: ADVIL,MOTRIN   400 mg, Oral, 2 Times Daily PRN      levothyroxine 137 MCG tablet  Commonly known as: SYNTHROID, LEVOTHROID   137 mcg, Oral, Daily      lubiprostone 24 MCG capsule  Commonly known as: AMITIZA   24 mcg, Oral, 2 Times Daily With Meals      magnesium oxide 400 (241.3 Mg) MG tablet tablet  Commonly known as: MAGOX   400 mg, Oral, Daily      polyethylene glycol packet  Commonly known as: MIRALAX   17 g, Oral, Daily      potassium chloride 20 MEQ CR tablet  Commonly known as: K-DUR,KLOR-CON   20 mEq, Oral, Daily      pregabalin 50 MG capsule  Commonly known as: LYRICA   50 mg, Oral, Nightly      rOPINIRole 4 MG tablet  Commonly known as: REQUIP   3 mg, Oral, Nightly      Rozerem 8 MG tablet  Generic drug: ramelteon   8 mg, Oral, Nightly      senna 8.6 MG tablet  Commonly known as: SENOKOT   Oral, Nightly      simethicone 80 MG chewable tablet  Commonly known as: MYLICON   80 mg, Oral, Every 6 Hours PRN      Sinemet  MG per tablet  Generic drug: carbidopa-levodopa   2 tablets, Oral, 3 Times Daily      vitamin C 500 MG tablet  Commonly known as:  ASCORBIC ACID   500 mg, Oral, Daily      vitamin D 1.25 MG (66756 UT) capsule capsule  Commonly known as: ERGOCALCIFEROL   50,000 Units, Oral, Weekly         Stop These Medications    famotidine 10 MG tablet  Commonly known as: PEPCID     melatonin 5 MG tablet tablet            Discharge Diet:   Diet Instructions     Diet: Consistent Carbohydrate      Discharge Diet: Consistent Carbohydrate          Activity at Discharge:   Activity Instructions     Activity as Tolerated            Follow-up Appointments:  Future Appointments   Date Time Provider Department Center   10/15/2020 10:30 AM [facility] WOUND CARE ROOM 2  [facility] None         Test Results Pending at Discharge:  Final urine cultures    Condition on Discharge:    Stable      Risk for Readmission (LACE): Score: 2 (10/11/2020  6:00 AM)          Niles Montanez DO  10/11/20  15:21 EDT

## 2020-10-12 NOTE — PROGRESS NOTES
Discharge Planning Assessment   [facility]     Patient Name: Lorenza Owens  MRN: [id number]  Today's Date: 10/12/2020    Admit Date: 10/9/2020          Plan    Final Discharge Disposition Code  01 - home or self-care    Final Note  return home       Carol naegele rn  Case management  Office number [phone]  Cell phone [phone]

## 2020-10-14 LAB
BACTERIA SPEC AEROBE CULT: NORMAL
BACTERIA SPEC AEROBE CULT: NORMAL

## 2020-10-15 ENCOUNTER — APPOINTMENT (OUTPATIENT)
Dept: WOUND CARE | Facility: HOSPITAL | Age: 85
End: 2020-10-15

## 2020-10-20 ENCOUNTER — APPOINTMENT (OUTPATIENT)
Dept: WOUND CARE | Facility: HOSPITAL | Age: 85
End: 2020-10-20

## 2020-10-29 ENCOUNTER — OFFICE VISIT (OUTPATIENT)
Dept: WOUND CARE | Facility: HOSPITAL | Age: 85
End: 2020-10-29

## 2020-11-12 ENCOUNTER — OFFICE VISIT (OUTPATIENT)
Dept: WOUND CARE | Facility: HOSPITAL | Age: 85
End: 2020-11-12

## 2020-12-03 ENCOUNTER — OFFICE VISIT (OUTPATIENT)
Dept: WOUND CARE | Facility: HOSPITAL | Age: 85
End: 2020-12-03

## 2020-12-17 ENCOUNTER — APPOINTMENT (OUTPATIENT)
Dept: WOUND CARE | Facility: HOSPITAL | Age: 85
End: 2020-12-17

## 2020-12-30 ENCOUNTER — OFFICE VISIT (OUTPATIENT)
Dept: WOUND CARE | Facility: HOSPITAL | Age: 85
End: 2020-12-30

## 2021-03-04 ENCOUNTER — APPOINTMENT (OUTPATIENT)
Dept: WOUND CARE | Facility: HOSPITAL | Age: 86
End: 2021-03-04

## 2021-03-04 ENCOUNTER — OFFICE VISIT (OUTPATIENT)
Dept: WOUND CARE | Facility: HOSPITAL | Age: 86
End: 2021-03-04

## 2021-03-04 PROCEDURE — G0463 HOSPITAL OUTPT CLINIC VISIT: HCPCS

## 2021-03-18 ENCOUNTER — OFFICE VISIT (OUTPATIENT)
Dept: WOUND CARE | Facility: HOSPITAL | Age: 86
End: 2021-03-18

## 2021-03-18 ENCOUNTER — TRANSCRIBE ORDERS (OUTPATIENT)
Dept: ADMINISTRATIVE | Facility: HOSPITAL | Age: 86
End: 2021-03-18

## 2021-03-18 DIAGNOSIS — I87.323 IDIOPATHIC CHRONIC VENOUS HYPERTENSION OF BOTH LOWER EXTREMITIES WITH INFLAMMATION: Primary | ICD-10-CM

## 2021-03-24 ENCOUNTER — APPOINTMENT (OUTPATIENT)
Dept: CARDIOLOGY | Facility: HOSPITAL | Age: 86
End: 2021-03-24

## 2021-03-25 ENCOUNTER — APPOINTMENT (OUTPATIENT)
Dept: WOUND CARE | Facility: HOSPITAL | Age: 86
End: 2021-03-25

## 2021-03-25 ENCOUNTER — HOSPITAL ENCOUNTER (EMERGENCY)
Facility: HOSPITAL | Age: 86
Discharge: NURSING, REHAB OR LONG-TERM CARE | End: 2021-03-25
Admitting: EMERGENCY MEDICINE

## 2021-03-25 VITALS
HEIGHT: 60 IN | HEART RATE: 89 BPM | BODY MASS INDEX: 38.48 KG/M2 | WEIGHT: 196 LBS | SYSTOLIC BLOOD PRESSURE: 125 MMHG | DIASTOLIC BLOOD PRESSURE: 48 MMHG | OXYGEN SATURATION: 95 % | TEMPERATURE: 98.2 F | RESPIRATION RATE: 19 BRPM

## 2021-03-25 DIAGNOSIS — N39.0 URINARY TRACT INFECTION WITHOUT HEMATURIA, SITE UNSPECIFIED: Primary | ICD-10-CM

## 2021-03-25 DIAGNOSIS — R53.1 WEAKNESS: ICD-10-CM

## 2021-03-25 LAB
ABO GROUP BLD: NORMAL
ALBUMIN SERPL-MCNC: 3.5 G/DL (ref 3.5–5.2)
ALBUMIN/GLOB SERPL: 0.9 G/DL
ALP SERPL-CCNC: 75 U/L (ref 39–117)
ALT SERPL W P-5'-P-CCNC: 8 U/L (ref 1–33)
AMORPH URATE CRY URNS QL MICRO: ABNORMAL /HPF
ANION GAP SERPL CALCULATED.3IONS-SCNC: 8 MMOL/L (ref 5–15)
AST SERPL-CCNC: 15 U/L (ref 1–32)
BACTERIA UR QL AUTO: ABNORMAL /HPF
BASOPHILS # BLD AUTO: 0 10*3/MM3 (ref 0–0.2)
BASOPHILS NFR BLD AUTO: 1 % (ref 0–1.5)
BILIRUB SERPL-MCNC: 0.4 MG/DL (ref 0–1.2)
BILIRUB UR QL STRIP: NEGATIVE
BLD GP AB SCN SERPL QL: NEGATIVE
BUN SERPL-MCNC: 28 MG/DL (ref 8–23)
BUN/CREAT SERPL: 23.3 (ref 7–25)
CALCIUM SPEC-SCNC: 8.8 MG/DL (ref 8.6–10.5)
CHLORIDE SERPL-SCNC: 107 MMOL/L (ref 98–107)
CLARITY UR: ABNORMAL
CO2 SERPL-SCNC: 27 MMOL/L (ref 22–29)
COLOR UR: YELLOW
CREAT SERPL-MCNC: 1.2 MG/DL (ref 0.57–1)
DEPRECATED RDW RBC AUTO: 54.7 FL (ref 37–54)
EOSINOPHIL # BLD AUTO: 0.2 10*3/MM3 (ref 0–0.4)
EOSINOPHIL NFR BLD AUTO: 5.6 % (ref 0.3–6.2)
ERYTHROCYTE [DISTWIDTH] IN BLOOD BY AUTOMATED COUNT: 17.5 % (ref 12.3–15.4)
GFR SERPL CREATININE-BSD FRML MDRD: 43 ML/MIN/1.73
GLOBULIN UR ELPH-MCNC: 3.8 GM/DL
GLUCOSE SERPL-MCNC: 117 MG/DL (ref 65–99)
GLUCOSE UR STRIP-MCNC: NEGATIVE MG/DL
HCT VFR BLD AUTO: 25.1 % (ref 34–46.6)
HGB BLD-MCNC: 8.4 G/DL (ref 12–15.9)
HGB UR QL STRIP.AUTO: ABNORMAL
HYALINE CASTS UR QL AUTO: ABNORMAL /LPF
INR PPP: 1.07 (ref 0.93–1.1)
KETONES UR QL STRIP: NEGATIVE
LEUKOCYTE ESTERASE UR QL STRIP.AUTO: ABNORMAL
LIPASE SERPL-CCNC: 27 U/L (ref 13–60)
LYMPHOCYTES # BLD AUTO: 0.7 10*3/MM3 (ref 0.7–3.1)
LYMPHOCYTES NFR BLD AUTO: 20 % (ref 19.6–45.3)
MCH RBC QN AUTO: 29.3 PG (ref 26.6–33)
MCHC RBC AUTO-ENTMCNC: 33.3 G/DL (ref 31.5–35.7)
MCV RBC AUTO: 87.9 FL (ref 79–97)
MONOCYTES # BLD AUTO: 0.4 10*3/MM3 (ref 0.1–0.9)
MONOCYTES NFR BLD AUTO: 10.3 % (ref 5–12)
NEUTROPHILS NFR BLD AUTO: 2.1 10*3/MM3 (ref 1.7–7)
NEUTROPHILS NFR BLD AUTO: 63.1 % (ref 42.7–76)
NITRITE UR QL STRIP: NEGATIVE
NRBC BLD AUTO-RTO: 0.1 /100 WBC (ref 0–0.2)
PH UR STRIP.AUTO: 6.5 [PH] (ref 5–8)
PLATELET # BLD AUTO: 220 10*3/MM3 (ref 140–450)
PMV BLD AUTO: 6.9 FL (ref 6–12)
POTASSIUM SERPL-SCNC: 3.9 MMOL/L (ref 3.5–5.2)
PROT SERPL-MCNC: 7.3 G/DL (ref 6–8.5)
PROT UR QL STRIP: NEGATIVE
PROTHROMBIN TIME: 11.7 SECONDS (ref 9.6–11.7)
RBC # BLD AUTO: 2.85 10*6/MM3 (ref 3.77–5.28)
RBC # UR: ABNORMAL /HPF
REF LAB TEST METHOD: ABNORMAL
RENAL EPI CELLS #/AREA URNS HPF: ABNORMAL /HPF
RH BLD: POSITIVE
SODIUM SERPL-SCNC: 142 MMOL/L (ref 136–145)
SP GR UR STRIP: 1.02 (ref 1–1.03)
SQUAMOUS #/AREA URNS HPF: ABNORMAL /HPF
T&S EXPIRATION DATE: NORMAL
TROPONIN T SERPL-MCNC: <0.01 NG/ML (ref 0–0.03)
UROBILINOGEN UR QL STRIP: ABNORMAL
WBC # BLD AUTO: 3.4 10*3/MM3 (ref 3.4–10.8)
WBC UR QL AUTO: ABNORMAL /HPF
YEAST URNS QL MICRO: ABNORMAL /HPF

## 2021-03-25 PROCEDURE — P9612 CATHETERIZE FOR URINE SPEC: HCPCS

## 2021-03-25 PROCEDURE — 99284 EMERGENCY DEPT VISIT MOD MDM: CPT

## 2021-03-25 PROCEDURE — 96374 THER/PROPH/DIAG INJ IV PUSH: CPT

## 2021-03-25 PROCEDURE — 86901 BLOOD TYPING SEROLOGIC RH(D): CPT | Performed by: PHYSICIAN ASSISTANT

## 2021-03-25 PROCEDURE — 80053 COMPREHEN METABOLIC PANEL: CPT | Performed by: PHYSICIAN ASSISTANT

## 2021-03-25 PROCEDURE — 87086 URINE CULTURE/COLONY COUNT: CPT | Performed by: PHYSICIAN ASSISTANT

## 2021-03-25 PROCEDURE — 86900 BLOOD TYPING SEROLOGIC ABO: CPT | Performed by: PHYSICIAN ASSISTANT

## 2021-03-25 PROCEDURE — 87077 CULTURE AEROBIC IDENTIFY: CPT | Performed by: PHYSICIAN ASSISTANT

## 2021-03-25 PROCEDURE — 93005 ELECTROCARDIOGRAM TRACING: CPT | Performed by: PHYSICIAN ASSISTANT

## 2021-03-25 PROCEDURE — 83690 ASSAY OF LIPASE: CPT | Performed by: PHYSICIAN ASSISTANT

## 2021-03-25 PROCEDURE — 85610 PROTHROMBIN TIME: CPT | Performed by: PHYSICIAN ASSISTANT

## 2021-03-25 PROCEDURE — 87186 SC STD MICRODIL/AGAR DIL: CPT | Performed by: PHYSICIAN ASSISTANT

## 2021-03-25 PROCEDURE — 84484 ASSAY OF TROPONIN QUANT: CPT | Performed by: PHYSICIAN ASSISTANT

## 2021-03-25 PROCEDURE — 86900 BLOOD TYPING SEROLOGIC ABO: CPT

## 2021-03-25 PROCEDURE — 25010000002 CEFTRIAXONE PER 250 MG: Performed by: PHYSICIAN ASSISTANT

## 2021-03-25 PROCEDURE — 86850 RBC ANTIBODY SCREEN: CPT | Performed by: PHYSICIAN ASSISTANT

## 2021-03-25 PROCEDURE — 86901 BLOOD TYPING SEROLOGIC RH(D): CPT

## 2021-03-25 PROCEDURE — 85025 COMPLETE CBC W/AUTO DIFF WBC: CPT | Performed by: PHYSICIAN ASSISTANT

## 2021-03-25 PROCEDURE — 81001 URINALYSIS AUTO W/SCOPE: CPT | Performed by: PHYSICIAN ASSISTANT

## 2021-03-25 RX ORDER — CEFDINIR 300 MG/1
300 CAPSULE ORAL 2 TIMES DAILY
Qty: 14 CAPSULE | Refills: 0 | Status: SHIPPED | OUTPATIENT
Start: 2021-03-25 | End: 2021-05-10

## 2021-03-25 RX ORDER — SODIUM CHLORIDE 0.9 % (FLUSH) 0.9 %
10 SYRINGE (ML) INJECTION AS NEEDED
Status: DISCONTINUED | OUTPATIENT
Start: 2021-03-25 | End: 2021-03-25 | Stop reason: HOSPADM

## 2021-03-25 RX ADMIN — WATER 1 G: 1000 INJECTION, SOLUTION INTRAVENOUS at 12:56

## 2021-03-25 NOTE — ED PROVIDER NOTES
Subjective   Chief Complaint: Weakness    Patient is a 86-year-old  female with history of GERD, Parkinson's, osteoarthritis, who lives at [facility] presents to the ER with chief complaint of weakness and dysuria.  Per patient she states that she was diagnosed with UTI about 1 week ago and has been on antibiotics.  Patient still reports some dysuria symptoms she denies any abdominal pain, nausea vomiting or diarrhea.  Per EMS and per nursing facility patient lab work was drawn yesterday, she was found to have a hemoglobin of 7.1.  Patient and nursing home deny any known sources of bleeding at this time.  Patient denies any hematemesis, hemoptysis, hematochezia or melena.  Patient also complains of some generalized weakness.  She denies any chest pain shortness of breath headache lightheadedness dizziness or fever or chills.  She denies being on any blood thinners.      PCP: Shikha Mcmahon      History provided by:  Patient      Review of Systems   Constitutional: Negative for chills and fever.   HENT: Negative for sore throat and trouble swallowing.    Eyes: Negative for visual disturbance.   Respiratory: Negative for shortness of breath and wheezing.    Cardiovascular: Negative for chest pain.   Gastrointestinal: Negative for abdominal pain, diarrhea, nausea and vomiting.   Genitourinary: Positive for dysuria. Negative for difficulty urinating and flank pain.   Musculoskeletal: Negative for myalgias.   Skin: Negative for rash.   Neurological: Positive for weakness. Negative for dizziness, light-headedness and headaches.   Psychiatric/Behavioral: Negative for behavioral problems.   All other systems reviewed and are negative.      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/5/2011   • Sleep apnea 6/21/2013   • Wheelchair dependence 7/19/2013       Allergies   Allergen Reactions   • Latex Rash       Past Surgical History:   Procedure Laterality Date   • KNEE ARTHROPLASTY         Family History   Problem Relation Age of Onset   • No Known Problems Mother    • No Known Problems Father        Social History     Socioeconomic History   • Marital status:      Spouse name: Not on file   • Number of children: Not on file   • Years of education: Not on file   • Highest education level: Not on file   Tobacco Use   • Smoking status: Never Smoker   • Smokeless tobacco: Never Used   Substance and Sexual Activity   • Alcohol use: No   • Drug use: No   • Sexual activity: Defer           Objective   Physical Exam  Vitals and nursing note reviewed.   Constitutional:       Appearance: Normal appearance. She is well-developed. She is obese. She is not ill-appearing or toxic-appearing.   HENT:      Head: Normocephalic and atraumatic.      Nose: Nose normal.      Mouth/Throat:      Mouth: Mucous membranes are moist.   Eyes:      Extraocular Movements: Extraocular movements intact.      Pupils: Pupils are equal, round, and reactive to light.   Cardiovascular:      Rate and Rhythm: Normal rate and regular rhythm.      Pulses: Normal pulses.      Heart sounds: Normal heart sounds. No murmur heard.     Pulmonary:      Effort: Pulmonary effort is normal. No respiratory distress.      Breath sounds: Normal breath sounds. No wheezing.   Abdominal:      General: Bowel sounds are normal. There is no distension.      Palpations: Abdomen is soft.      Tenderness: There is no abdominal tenderness. There is no right CVA tenderness or left CVA  "tenderness.   Musculoskeletal:         General: Tenderness present. Normal range of motion.      Cervical back: Normal range of motion. No tenderness.      Comments: Bilateral hips flexed, knees flexed, unable to straighten, patient reports due to multiple surgeries, patient states this is her normal.   Skin:     General: Skin is warm and dry.      Capillary Refill: Capillary refill takes less than 2 seconds.      Findings: No erythema or rash.      Comments: Wound care dressing and bandages noted bilateral lower extremities without significant erythema, no drainage   Neurological:      General: No focal deficit present.      Mental Status: She is alert and oriented to person, place, and time.      Cranial Nerves: No cranial nerve deficit.      Sensory: No sensory deficit.   Psychiatric:         Mood and Affect: Mood normal.         Behavior: Behavior normal.         ECG 12 Lead      Date/Time: 3/25/2021 1:41 PM  Performed by: Natalie Oliver PA  Authorized by: Natalie Oliver PA   Interpreted by physician (Nerissa)  Rhythm: sinus rhythm  Rate: normal  BPM: 85  QRS axis: normal  Conduction: conduction normal  ST Segments: ST segments normal  T Waves: T waves normal  Other: no other findings  Clinical impression: normal ECG                 ED Course  ED Course as of Mar 25 1618   Thu Mar 25, 2021   1218 Called wound clinic and left a message for wound care nurse    [MM]   1240 Spoke with Amanda with the wound care nurse who states that they do not have the supplies to change patient's dressing.  She states that dressing is changed 3 times a week at [facility] and that this can be done there, or she can reschedule her appointment.    [MM]      ED Course User Index  [MM] Natalie Oliver PA    /48   Pulse 89   Temp 98.2 °F (36.8 °C)   Resp 19   Ht 152.4 cm (60\")   Wt 88.9 kg (196 lb)   SpO2 95%   BMI 38.28 kg/m²   Labs Reviewed   COMPREHENSIVE METABOLIC PANEL - Abnormal; Notable for the following " components:       Result Value    Glucose 117 (*)     BUN 28 (*)     Creatinine 1.20 (*)     eGFR Non  Amer 43 (*)     All other components within normal limits    Narrative:     GFR Normal >60  Chronic Kidney Disease <60  Kidney Failure <15     URINALYSIS W/ CULTURE IF INDICATED - Abnormal; Notable for the following components:    Appearance, UA Cloudy (*)     Blood, UA Trace (*)     Leuk Esterase, UA Large (3+) (*)     All other components within normal limits   CBC WITH AUTO DIFFERENTIAL - Abnormal; Notable for the following components:    RBC 2.85 (*)     Hemoglobin 8.4 (*)     Hematocrit 25.1 (*)     RDW 17.5 (*)     RDW-SD 54.7 (*)     All other components within normal limits   URINALYSIS, MICROSCOPIC ONLY - Abnormal; Notable for the following components:    RBC, UA 3-5 (*)     WBC, UA Too Numerous to Count (*)     Bacteria, UA 1+ (*)     Renal Epithelial Cells, UA 3-6 (*)     All other components within normal limits   PROTIME-INR - Normal   TROPONIN (IN-HOUSE) - Normal    Narrative:     Troponin T Reference Range:  <= 0.03 ng/mL-   Negative for AMI  >0.03 ng/mL-     Abnormal for myocardial necrosis.  Clinicians would have to utilize clinical acumen, EKG, Troponin and serial changes to determine if it is an Acute Myocardial Infarction or myocardial injury due to an underlying chronic condition.       Results may be falsely decreased if patient taking Biotin.     LIPASE - Normal   URINE CULTURE   TYPE AND SCREEN   BB ARMBAND CHECK   CBC AND DIFFERENTIAL    Narrative:     The following orders were created for panel order CBC & Differential.  Procedure                               Abnormality         Status                     ---------                               -----------         ------                     CBC Auto Differential[[id number]]        Abnormal            Final result                 Please view results for these tests on the individual orders.   EXTRA TUBES    Narrative:     The  following orders were created for panel order Extra Tubes.  Procedure                               Abnormality         Status                     ---------                               -----------         ------                     Gold Top - SST[[id number]]                                   Final result                 Please view results for these tests on the individual orders.   GOLD TOP - SST     Medications   sodium chloride 0.9 % flush 10 mL (has no administration in time range)   cefTRIAXone (ROCEPHIN) in SWFI 1 gram/10ml IV PUSH syringe (1 g Intravenous Given 3/25/21 1256)     No radiology results for the last day                                         MDM  Number of Diagnoses or Management Options  Urinary tract infection without hematuria, site unspecified  Weakness  Diagnosis management comments: MEDICAL DECISION  Epic Chart Review: Patient last admission October 2020 for UTI  Comorbidities: MIRI, anxiety, GERD, hyperlipidemia, PVD  Differentials: UTI, dysrhythmia, electro abnormalities, anemia, GI bleed; this list is not all inclusive and does not constitute the entirety of considered causes  Radiology interpretation: Not warranted  Lab interpretation:  Labs viewed by me significant for, CMP glucose 117, BUN 28, creatinine 1.20.  PT/INR normal.  Type and screen A+.  Urinalysis cloudy, trace blood, 3+ leukocytes, TNTC WBCs, 1+ bacteria, nitrite negative.  Troponin normal less than 0.01.  Lipase normal 27.  CBC hemoglobin 8.4, hematocrit 25.1.  EKG interpretation: Normal sinus rhythm with rate 87 with no acute ST changes    While in the ED IV was placed and labs were obtained appropriate PPE was worn during exam and throughout all encounters with the patient.  IV established, lab work obtained.  Physical exam relatively unremarkable, patient's legs appear contracted, flexed, she states that this is normal for her due to her multiple previous surgeries.  Patient's main complaint dysuria.  She denies  abdominal pain, nausea vomiting diarrhea.  Abdomen exam is benign.  Lab work sent for mild MIRI, urinalysis positive for UTI.  Patient previously on Macrobid.  Patient given 1 g Rocephin while in the ER.  Patient denies any source of bleeding, no hemoptysis, hematemesis, hematochezia or melena.  Hemoglobin today 8.4, hematocrit 25.1.  EKG was normal sinus rhythm with rate 87 and no acute ST changes.  Patient's daughter at bedside is requesting wound care appointment and evaluation since she missed her outpatient appointment today.  I contacted wound care, spoke with Amanda who states that patient sees wound care outpatient and inpatient office does not have the appropriate dressings here at the hospital and patient can have dressing changed at nursing home like she has been for the last few weeks.  This is discussed with patient's daughter at bedside who states that they will make other appointment with wound care next week.  Patient's legs do not appear erythematous, edematous, there is no warmth or drainage.  Patient is afebrile and nontoxic in appearance.  Patient was discharged back to [facility].  Patient was given prescription for cefdinir for UTI.  EMS called for patient transport.    Discharge plan and instructions were discussed with the patient who verbalized understanding and is in agreement with the plan, all questions were answered at this time.  Patient is aware of signs symptoms that would require immediate return to the emergency room.  Patient understands importance of following up with primary care provider for further evaluation and worsening concerns as well as blood pressure recheck in the next 4 weeks.    Patient remained afebrile, nontoxic-appearing, no acute respiratory distress throughout entire emergency room stay.  Patient was discharged in improved stable condition.       Amount and/or Complexity of Data Reviewed  Clinical lab tests: reviewed and ordered  Tests in the medicine section of  CPT®: reviewed    Patient Progress  Patient progress: stable      Final diagnoses:   Urinary tract infection without hematuria, site unspecified   Weakness       ED Disposition  ED Disposition     ED Disposition Condition Comment    Discharge Stable           Shikha Mcmahon, APRN  [address] [place] [address] [phone]    Schedule an appointment as soon as possible for a visit in 2 days  As needed, If symptoms worsen         Where to Get Your Medications      These medications were sent to [id number] - [place], [place] - [address] [facility] [address] - [phone] PH - [phone] FX  [address] [place] [address]    Phone: [phone]   · cefdinir 300 MG capsule        Medication List      No changes were made to your prescriptions during this visit.          Natalie Oliver PA  03/25/21 8793

## 2021-03-25 NOTE — ED NOTES
Patient reports no complaints today, states she was sent for low hemoglobin, she was diagnosed with a UTI about a week ago and was started on an abx     Debbi Callahan RN  03/25/21 1054

## 2021-03-25 NOTE — DISCHARGE INSTRUCTIONS
Take cefdinir antibiotics to completion for your UTI  Drink plenty of fluids  Reschedule your appointment with wound care.  Continue to have nursing home change dressing 3 times a week    Follow-up with primary care for further management evaluation    Return to the ER for new or worsening symptoms

## 2021-03-27 LAB
BACTERIA SPEC AEROBE CULT: ABNORMAL
QT INTERVAL: 378 MS

## 2021-03-27 NOTE — PROGRESS NOTES
Patient urine culture resulted with K. pneumoniae. Susceptible to Cephalosporins (3rd gen). Patient was given Rx for cefdinir. Therapy is appropriate coverage. No further follow-up required..    Microbiology Results (last 10 days)       Procedure Component Value - Date/Time    Urine Culture - Urine, Urine, Catheter In/Out [[id number]]  (Abnormal)  (Susceptibility) Collected: 03/25/21 1043    Lab Status: Final result Specimen: Urine, Catheter In/Out Updated: 03/27/21 1055     Urine Culture >100,000 CFU/mL Klebsiella pneumoniae ssp pneumoniae    Susceptibility        Klebsiella pneumoniae ssp pneumoniae      RENA      Ampicillin Resistant      Ampicillin + Sulbactam Susceptible      Cefazolin Susceptible      Cefepime Susceptible      Ceftazidime Susceptible      Ceftriaxone Susceptible      Gentamicin Susceptible      Levofloxacin Susceptible      Nitrofurantoin Susceptible      Piperacillin + Tazobactam Susceptible      Tetracycline Susceptible      Trimethoprim + Sulfamethoxazole Susceptible                 Linear View                               Judy Rahman, PharmD  3/27/2021 11:31 EDT

## 2021-04-01 ENCOUNTER — OFFICE VISIT (OUTPATIENT)
Dept: WOUND CARE | Facility: HOSPITAL | Age: 86
End: 2021-04-01

## 2021-04-07 ENCOUNTER — APPOINTMENT (OUTPATIENT)
Dept: WOUND CARE | Facility: HOSPITAL | Age: 86
End: 2021-04-07

## 2021-04-08 ENCOUNTER — OFFICE VISIT (OUTPATIENT)
Dept: WOUND CARE | Facility: HOSPITAL | Age: 86
End: 2021-04-08

## 2021-04-22 ENCOUNTER — OFFICE VISIT (OUTPATIENT)
Dept: WOUND CARE | Facility: HOSPITAL | Age: 86
End: 2021-04-22

## 2021-04-22 ENCOUNTER — LAB REQUISITION (OUTPATIENT)
Dept: LAB | Facility: HOSPITAL | Age: 86
End: 2021-04-22

## 2021-04-22 DIAGNOSIS — I87.313 CHRONIC VENOUS HYPERTENSION (IDIOPATHIC) WITH ULCER OF BILATERAL LOWER EXTREMITY (CODE) (HCC): ICD-10-CM

## 2021-04-22 PROCEDURE — 87077 CULTURE AEROBIC IDENTIFY: CPT | Performed by: SURGERY

## 2021-04-22 PROCEDURE — 87070 CULTURE OTHR SPECIMN AEROBIC: CPT | Performed by: SURGERY

## 2021-04-22 PROCEDURE — 87205 SMEAR GRAM STAIN: CPT | Performed by: SURGERY

## 2021-04-22 PROCEDURE — 87186 SC STD MICRODIL/AGAR DIL: CPT | Performed by: SURGERY

## 2021-04-25 LAB
BACTERIA SPEC AEROBE CULT: ABNORMAL
BACTERIA SPEC AEROBE CULT: ABNORMAL
GRAM STN SPEC: ABNORMAL

## 2021-04-26 ENCOUNTER — HOSPITAL ENCOUNTER (OUTPATIENT)
Dept: CARDIOLOGY | Facility: HOSPITAL | Age: 86
Discharge: HOME | End: 2021-04-26
Admitting: SURGERY

## 2021-04-26 DIAGNOSIS — I87.323 IDIOPATHIC CHRONIC VENOUS HYPERTENSION OF BOTH LOWER EXTREMITIES WITH INFLAMMATION: ICD-10-CM

## 2021-04-26 LAB
BH CV LEFT LOWER VAS COMMON FEMORAL TRANSVERSE DIAMETER: 1.06 CM
BH CV LEFT LOWER VAS GSV KNEE TRANSVERSE DIAMETER: 0.36 CM
BH CV LEFT LOWER VAS GSV MID TRANSVERSE DIAMETER: 0.43 CM
BH CV LEFT LOWER VAS GSV PROX TRANSVERSE DIAMETER: 0.47 CM
BH CV LEFT LOWER VAS GSVBELOW KNEE TRANSVERSE DIAMETER: 0.31 CM
BH CV LEFT LOWER VAS SAPHENOFEMORAL JUNCTION TRANSVERSE DIAMETER: 0.75 CM
BH CV LEFT LOWER VAS SSV PROX CALF TRANS DIAMETER: 0.37 CM
BH CV RIGHT LOWER VAS COMMON FEMORAL TRANSVERSE DIAMETER: 1.07 CM
BH CV RIGHT LOWER VAS GSV KNEE TRANS DIAMETER: 0.35 CM
BH CV RIGHT LOWER VAS GSV MID THIGH REFLUX TIME: 6005 MSEC
BH CV RIGHT LOWER VAS GSV MID THIGH TRANS DIAMETER: 0.27 CM
BH CV RIGHT LOWER VAS GSV PROX CALF REFLUX TIME: 610 MSEC
BH CV RIGHT LOWER VAS GSV PROX CALF TRANS DIAMETER: 0.26 CM
BH CV RIGHT LOWER VAS GSV PROX THIGH TRANS DIAMETER: 0.52 CM
BH CV RIGHT LOWER VAS MID FEMORAL REFLUX TIME: 1600 MSEC
BH CV RIGHT LOWER VAS SAPHENOFEM JUNCTION TRANSVERSE DIAMETER: 0.65 CM
BH CV RIGHT LOWER VAS SSV TRANSVERSE DIAMETER: 0.49 CM
BH CV VAS LEFT COMMON FEMORAL VEIN HIDDEN LRR COLOR FLOW REVERSAL: NORMAL
BH CV VAS LEFT COMMON FEMORAL VEIN HIDDEN LRR COMPRESSIBILTY: NORMAL
BH CV VAS LEFT GSV ABOVE KNEE HIDDEN LRR COMPRESSIBILTY: NORMAL
BH CV VAS LEFT GSV BELOW KNEE HIDDEN LRR COLOR FLOW REVERSAL: NORMAL
BH CV VAS LEFT GSV BELOW KNEE HIDDEN LRR COMPRESSIBILTY: NORMAL
BH CV VAS LEFT GSV DISTAL THIGH HIDDEN LRR COLOR FLOW REVERSAL: NORMAL
BH CV VAS LEFT GSV MID  HIDDEN LRR COLOR FLOW REVERSAL: NORMAL
BH CV VAS LEFT GSV MID HIDDEN LRR COMPRESSIBILTY: NORMAL
BH CV VAS LEFT GSV PROXIMAL HIDDEN LRR COLOR FLOW REVERSAL: NORMAL
BH CV VAS LEFT GSV PROXIMAL HIDDEN LRR COMPRESSIBILTY: NORMAL
BH CV VAS LEFT MID FEMORAL VEIN HIDDEN LRR COLOR FLOW REVERSAL: NORMAL
BH CV VAS LEFT MID FEMORAL VEIN HIDDEN LRR COMPRESSIBILTY: NORMAL
BH CV VAS LEFT POPLITEAL VEIN HIDDEN LRR COLOR FLOW REVERSAL: NORMAL
BH CV VAS LEFT POPLITEAL VEIN HIDDEN LRR COMPRESSIBILTY: NORMAL
BH CV VAS LEFT SAPHENOFEMORAL JUNC HIDDEN LRR COLOR FLOW REVERSAL: NORMAL
BH CV VAS LEFT SAPHENOFEMORAL JUNCTION HIDDEN LRR COMPRESSIBILTY: NORMAL
BH CV VAS LEFT SSV HIDDEN LRR COLOR FLOW REVERSAL: NORMAL
BH CV VAS LEFT SSV HIDDEN LRR COMPRESSIBILTY: NORMAL
BH CV VAS RIGHT  GSV DISTAL THIGH HIDDEN LRR COLOR FLOW REVERSAL: NORMAL
BH CV VAS RIGHT COMMON FEMORAL VEIN HIDDEN LRR COLOR FLOW REVERSAL: NORMAL
BH CV VAS RIGHT COMMON FEMORAL VEIN HIDDEN LRR COMPRESSIBILTY: NORMAL
BH CV VAS RIGHT GSV ABOVE KNEE HIDDEN LRR COMPRESSIBILTY: NORMAL
BH CV VAS RIGHT GSV BELOW KNEE HIDDEN LRR COLOR FLOW REVERSAL: NORMAL
BH CV VAS RIGHT GSV BELOW KNEE HIDDEN LRR COMPRESSIBILTY: NORMAL
BH CV VAS RIGHT GSV MID  HIDDEN LRR COLOR FLOW REVERSAL: NORMAL
BH CV VAS RIGHT GSV MID HIDDEN LRR COMPRESSIBILTY: NORMAL
BH CV VAS RIGHT GSV PROXIMAL HIDDEN LRR COLOR FLOW REVERSAL: NORMAL
BH CV VAS RIGHT GSV PROXIMAL HIDDEN LRR COMPRESSIBILTY: NORMAL
BH CV VAS RIGHT MID FEMORAL VEIN HIDDEN LRR COLOR FLOW REVERSAL: NORMAL
BH CV VAS RIGHT MID FEMORAL VEIN HIDDEN LRR COMPRESSIBILTY: NORMAL
BH CV VAS RIGHT POPLITEAL VEIN HIDDEN LRR COLOR FLOW REVERSAL: NORMAL
BH CV VAS RIGHT POPLITEAL VEIN HIDDEN LRR COMPRESSIBILTY: NORMAL
BH CV VAS RIGHT SAPHENOFEMORAL JUNC HIDDEN LRR COLOR FLOW REVERSAL: NORMAL
BH CV VAS RIGHT SAPHENOFEMORAL JUNCTION HIDDEN LRR COMPRESSIBILTY: NORMAL
BH CV VAS RIGHT SSV HIDDEN LRR COLOR FLOW REVERSAL: NORMAL
BH CV VAS RIGHT SSV HIDDEN LRR COMPRESSIBILTY: NORMAL

## 2021-04-26 PROCEDURE — 93970 EXTREMITY STUDY: CPT

## 2021-04-29 ENCOUNTER — TRANSCRIBE ORDERS (OUTPATIENT)
Dept: ADMINISTRATIVE | Facility: HOSPITAL | Age: 86
End: 2021-04-29

## 2021-04-29 ENCOUNTER — OFFICE VISIT (OUTPATIENT)
Dept: WOUND CARE | Facility: HOSPITAL | Age: 86
End: 2021-04-29

## 2021-04-29 DIAGNOSIS — L97.811 NON-PRESSURE CHRONIC ULCER OF OTHER PART OF RIGHT LOWER LEG LIMITED TO BREAKDOWN OF SKIN (HCC): Primary | ICD-10-CM

## 2021-04-29 DIAGNOSIS — L97.912 NON-PRESSURE CHRONIC ULCER OF RIGHT LOWER LEG WITH FAT LAYER EXPOSED (HCC): Primary | ICD-10-CM

## 2021-04-29 PROCEDURE — G0463 HOSPITAL OUTPT CLINIC VISIT: HCPCS

## 2021-05-03 ENCOUNTER — APPOINTMENT (OUTPATIENT)
Dept: WOUND CARE | Facility: HOSPITAL | Age: 86
End: 2021-05-03

## 2021-05-06 ENCOUNTER — OFFICE VISIT (OUTPATIENT)
Dept: WOUND CARE | Facility: HOSPITAL | Age: 86
End: 2021-05-06

## 2021-05-06 PROCEDURE — G0463 HOSPITAL OUTPT CLINIC VISIT: HCPCS

## 2021-05-07 ENCOUNTER — APPOINTMENT (OUTPATIENT)
Dept: CARDIOLOGY | Facility: HOSPITAL | Age: 86
End: 2021-05-07

## 2021-05-10 ENCOUNTER — HOSPITAL ENCOUNTER (INPATIENT)
Facility: HOSPITAL | Age: 86
LOS: 7 days | Discharge: NURSING, REHAB OR LONG-TERM CARE | End: 2021-05-17
Attending: EMERGENCY MEDICINE | Admitting: INTERNAL MEDICINE

## 2021-05-10 ENCOUNTER — APPOINTMENT (OUTPATIENT)
Dept: GENERAL RADIOLOGY | Facility: HOSPITAL | Age: 86
End: 2021-05-10

## 2021-05-10 DIAGNOSIS — J18.9 PNEUMONIA OF RIGHT LOWER LOBE DUE TO INFECTIOUS ORGANISM: ICD-10-CM

## 2021-05-10 DIAGNOSIS — K92.1 GASTROINTESTINAL HEMORRHAGE WITH MELENA: ICD-10-CM

## 2021-05-10 DIAGNOSIS — R06.00 DYSPNEA, UNSPECIFIED TYPE: ICD-10-CM

## 2021-05-10 DIAGNOSIS — D64.9 SEVERE ANEMIA: Primary | ICD-10-CM

## 2021-05-10 PROBLEM — D62 ACUTE BLOOD LOSS ANEMIA: Status: ACTIVE | Noted: 2021-05-10

## 2021-05-10 LAB
ABO GROUP BLD: NORMAL
ALBUMIN SERPL-MCNC: 3.4 G/DL (ref 3.5–5.2)
ALBUMIN/GLOB SERPL: 0.9 G/DL
ALP SERPL-CCNC: 57 U/L (ref 39–117)
ALT SERPL W P-5'-P-CCNC: <5 U/L (ref 1–33)
ANION GAP SERPL CALCULATED.3IONS-SCNC: 8 MMOL/L (ref 5–15)
APTT PPP: 25.1 SECONDS (ref 24–31)
AST SERPL-CCNC: 12 U/L (ref 1–32)
BASOPHILS # BLD AUTO: 0 10*3/MM3 (ref 0–0.2)
BASOPHILS NFR BLD AUTO: 0.9 % (ref 0–1.5)
BILIRUB SERPL-MCNC: 0.5 MG/DL (ref 0–1.2)
BLD GP AB SCN SERPL QL: NEGATIVE
BUN SERPL-MCNC: 28 MG/DL (ref 8–23)
BUN/CREAT SERPL: 22.2 (ref 7–25)
CALCIUM SPEC-SCNC: 8.7 MG/DL (ref 8.6–10.5)
CHLORIDE SERPL-SCNC: 109 MMOL/L (ref 98–107)
CO2 SERPL-SCNC: 25 MMOL/L (ref 22–29)
CREAT SERPL-MCNC: 1.26 MG/DL (ref 0.57–1)
DEPRECATED RDW RBC AUTO: 53.4 FL (ref 37–54)
EOSINOPHIL # BLD AUTO: 0.1 10*3/MM3 (ref 0–0.4)
EOSINOPHIL NFR BLD AUTO: 2.5 % (ref 0.3–6.2)
ERYTHROCYTE [DISTWIDTH] IN BLOOD BY AUTOMATED COUNT: 18.9 % (ref 12.3–15.4)
GFR SERPL CREATININE-BSD FRML MDRD: 40 ML/MIN/1.73
GLOBULIN UR ELPH-MCNC: 3.7 GM/DL
GLUCOSE SERPL-MCNC: 110 MG/DL (ref 65–99)
HCT VFR BLD AUTO: 13.6 % (ref 34–46.6)
HGB BLD-MCNC: 4.4 G/DL (ref 12–15.9)
INR PPP: 1.21 (ref 0.93–1.1)
LYMPHOCYTES # BLD AUTO: 0.6 10*3/MM3 (ref 0.7–3.1)
LYMPHOCYTES NFR BLD AUTO: 18 % (ref 19.6–45.3)
MCH RBC QN AUTO: 26.4 PG (ref 26.6–33)
MCHC RBC AUTO-ENTMCNC: 32.7 G/DL (ref 31.5–35.7)
MCV RBC AUTO: 80.7 FL (ref 79–97)
MONOCYTES # BLD AUTO: 0.4 10*3/MM3 (ref 0.1–0.9)
MONOCYTES NFR BLD AUTO: 10.5 % (ref 5–12)
NEUTROPHILS NFR BLD AUTO: 2.3 10*3/MM3 (ref 1.7–7)
NEUTROPHILS NFR BLD AUTO: 68.1 % (ref 42.7–76)
NRBC BLD AUTO-RTO: 0.6 /100 WBC (ref 0–0.2)
NT-PROBNP SERPL-MCNC: 715 PG/ML (ref 0–1800)
PLATELET # BLD AUTO: 189 10*3/MM3 (ref 140–450)
PMV BLD AUTO: 7 FL (ref 6–12)
POTASSIUM SERPL-SCNC: 4.7 MMOL/L (ref 3.5–5.2)
PROT SERPL-MCNC: 7.1 G/DL (ref 6–8.5)
PROTHROMBIN TIME: 13.2 SECONDS (ref 9.6–11.7)
RBC # BLD AUTO: 1.68 10*6/MM3 (ref 3.77–5.28)
RH BLD: POSITIVE
SARS-COV-2 RNA PNL SPEC NAA+PROBE: NORMAL
SODIUM SERPL-SCNC: 142 MMOL/L (ref 136–145)
T&S EXPIRATION DATE: NORMAL
TROPONIN T SERPL-MCNC: <0.01 NG/ML (ref 0–0.03)
WBC # BLD AUTO: 3.5 10*3/MM3 (ref 3.4–10.8)

## 2021-05-10 PROCEDURE — 85730 THROMBOPLASTIN TIME PARTIAL: CPT | Performed by: EMERGENCY MEDICINE

## 2021-05-10 PROCEDURE — 84484 ASSAY OF TROPONIN QUANT: CPT | Performed by: EMERGENCY MEDICINE

## 2021-05-10 PROCEDURE — 36415 COLL VENOUS BLD VENIPUNCTURE: CPT

## 2021-05-10 PROCEDURE — 99285 EMERGENCY DEPT VISIT HI MDM: CPT

## 2021-05-10 PROCEDURE — 86900 BLOOD TYPING SEROLOGIC ABO: CPT | Performed by: EMERGENCY MEDICINE

## 2021-05-10 PROCEDURE — P9016 RBC LEUKOCYTES REDUCED: HCPCS

## 2021-05-10 PROCEDURE — 85025 COMPLETE CBC W/AUTO DIFF WBC: CPT | Performed by: EMERGENCY MEDICINE

## 2021-05-10 PROCEDURE — 87635 SARS-COV-2 COVID-19 AMP PRB: CPT | Performed by: EMERGENCY MEDICINE

## 2021-05-10 PROCEDURE — 86923 COMPATIBILITY TEST ELECTRIC: CPT

## 2021-05-10 PROCEDURE — 86901 BLOOD TYPING SEROLOGIC RH(D): CPT | Performed by: EMERGENCY MEDICINE

## 2021-05-10 PROCEDURE — 99223 1ST HOSP IP/OBS HIGH 75: CPT | Performed by: INTERNAL MEDICINE

## 2021-05-10 PROCEDURE — 25010000002 CEFTRIAXONE PER 250 MG: Performed by: EMERGENCY MEDICINE

## 2021-05-10 PROCEDURE — 80053 COMPREHEN METABOLIC PANEL: CPT | Performed by: EMERGENCY MEDICINE

## 2021-05-10 PROCEDURE — 36430 TRANSFUSION BLD/BLD COMPNT: CPT

## 2021-05-10 PROCEDURE — 86850 RBC ANTIBODY SCREEN: CPT | Performed by: EMERGENCY MEDICINE

## 2021-05-10 PROCEDURE — 83880 ASSAY OF NATRIURETIC PEPTIDE: CPT | Performed by: EMERGENCY MEDICINE

## 2021-05-10 PROCEDURE — 86900 BLOOD TYPING SEROLOGIC ABO: CPT

## 2021-05-10 PROCEDURE — 71045 X-RAY EXAM CHEST 1 VIEW: CPT

## 2021-05-10 PROCEDURE — 87040 BLOOD CULTURE FOR BACTERIA: CPT | Performed by: EMERGENCY MEDICINE

## 2021-05-10 PROCEDURE — 93005 ELECTROCARDIOGRAM TRACING: CPT | Performed by: EMERGENCY MEDICINE

## 2021-05-10 PROCEDURE — 85610 PROTHROMBIN TIME: CPT | Performed by: EMERGENCY MEDICINE

## 2021-05-10 PROCEDURE — 25010000002 MORPHINE PER 10 MG: Performed by: INTERNAL MEDICINE

## 2021-05-10 RX ORDER — MORPHINE SULFATE 4 MG/ML
2 INJECTION, SOLUTION INTRAMUSCULAR; INTRAVENOUS EVERY 4 HOURS PRN
Status: DISCONTINUED | OUTPATIENT
Start: 2021-05-10 | End: 2021-05-11

## 2021-05-10 RX ORDER — CHOLECALCIFEROL (VITAMIN D3) 125 MCG
5 CAPSULE ORAL NIGHTLY
Status: DISCONTINUED | OUTPATIENT
Start: 2021-05-10 | End: 2021-05-17 | Stop reason: HOSPADM

## 2021-05-10 RX ORDER — CHOLECALCIFEROL (VITAMIN D3) 125 MCG
5 CAPSULE ORAL NIGHTLY
COMMUNITY
End: 2021-07-27

## 2021-05-10 RX ORDER — IPRATROPIUM BROMIDE AND ALBUTEROL SULFATE 2.5; .5 MG/3ML; MG/3ML
3 SOLUTION RESPIRATORY (INHALATION) EVERY 6 HOURS PRN
Status: ON HOLD | COMMUNITY
End: 2022-10-23

## 2021-05-10 RX ORDER — HYDROCODONE BITARTRATE AND ACETAMINOPHEN 7.5; 325 MG/1; MG/1
1 TABLET ORAL EVERY 6 HOURS PRN
Status: DISCONTINUED | OUTPATIENT
Start: 2021-05-10 | End: 2021-05-11

## 2021-05-10 RX ORDER — PANTOPRAZOLE SODIUM 40 MG/1
40 TABLET, DELAYED RELEASE ORAL EVERY MORNING
Status: DISCONTINUED | OUTPATIENT
Start: 2021-05-11 | End: 2021-05-17 | Stop reason: HOSPADM

## 2021-05-10 RX ORDER — PREGABALIN 25 MG/1
50 CAPSULE ORAL NIGHTLY
Status: DISCONTINUED | OUTPATIENT
Start: 2021-05-10 | End: 2021-05-17 | Stop reason: HOSPADM

## 2021-05-10 RX ORDER — DULOXETIN HYDROCHLORIDE 30 MG/1
60 CAPSULE, DELAYED RELEASE ORAL DAILY
Status: DISCONTINUED | OUTPATIENT
Start: 2021-05-11 | End: 2021-05-17 | Stop reason: HOSPADM

## 2021-05-10 RX ORDER — ALLOPURINOL 100 MG/1
100 TABLET ORAL DAILY
Status: DISCONTINUED | OUTPATIENT
Start: 2021-05-11 | End: 2021-05-17 | Stop reason: HOSPADM

## 2021-05-10 RX ORDER — LUBIPROSTONE 24 UG/1
24 CAPSULE ORAL 2 TIMES DAILY WITH MEALS
Status: DISCONTINUED | OUTPATIENT
Start: 2021-05-10 | End: 2021-05-17 | Stop reason: HOSPADM

## 2021-05-10 RX ORDER — ONDANSETRON 2 MG/ML
4 INJECTION INTRAMUSCULAR; INTRAVENOUS EVERY 6 HOURS PRN
Status: DISCONTINUED | OUTPATIENT
Start: 2021-05-10 | End: 2021-05-17 | Stop reason: HOSPADM

## 2021-05-10 RX ORDER — SODIUM CHLORIDE 0.9 % (FLUSH) 0.9 %
10 SYRINGE (ML) INJECTION AS NEEDED
Status: DISCONTINUED | OUTPATIENT
Start: 2021-05-10 | End: 2021-05-17 | Stop reason: HOSPADM

## 2021-05-10 RX ORDER — ACETAMINOPHEN 650 MG/1
650 SUPPOSITORY RECTAL EVERY 4 HOURS PRN
Status: DISCONTINUED | OUTPATIENT
Start: 2021-05-10 | End: 2021-05-11

## 2021-05-10 RX ORDER — FENOFIBRATE 145 MG/1
145 TABLET, COATED ORAL DAILY
Status: DISCONTINUED | OUTPATIENT
Start: 2021-05-11 | End: 2021-05-17 | Stop reason: HOSPADM

## 2021-05-10 RX ORDER — ONDANSETRON 4 MG/1
4 TABLET, FILM COATED ORAL EVERY 6 HOURS PRN
Status: DISCONTINUED | OUTPATIENT
Start: 2021-05-10 | End: 2021-05-17 | Stop reason: HOSPADM

## 2021-05-10 RX ORDER — ARIPIPRAZOLE 2 MG/1
2 TABLET ORAL NIGHTLY
Status: DISCONTINUED | OUTPATIENT
Start: 2021-05-10 | End: 2021-05-17 | Stop reason: HOSPADM

## 2021-05-10 RX ORDER — IBUPROFEN 400 MG/1
400 TABLET ORAL 2 TIMES DAILY PRN
Status: DISCONTINUED | OUTPATIENT
Start: 2021-05-10 | End: 2021-05-11

## 2021-05-10 RX ORDER — SODIUM CHLORIDE 9 MG/ML
100 INJECTION, SOLUTION INTRAVENOUS CONTINUOUS
Status: DISCONTINUED | OUTPATIENT
Start: 2021-05-10 | End: 2021-05-17 | Stop reason: HOSPADM

## 2021-05-10 RX ORDER — POLYETHYLENE GLYCOL 3350 17 G/17G
17 POWDER, FOR SOLUTION ORAL DAILY PRN
Status: DISCONTINUED | OUTPATIENT
Start: 2021-05-10 | End: 2021-05-17 | Stop reason: HOSPADM

## 2021-05-10 RX ORDER — BISACODYL 10 MG
10 SUPPOSITORY, RECTAL RECTAL DAILY PRN
Status: DISCONTINUED | OUTPATIENT
Start: 2021-05-10 | End: 2021-05-17 | Stop reason: HOSPADM

## 2021-05-10 RX ORDER — ACETAMINOPHEN 160 MG/5ML
650 SOLUTION ORAL EVERY 4 HOURS PRN
Status: DISCONTINUED | OUTPATIENT
Start: 2021-05-10 | End: 2021-05-11

## 2021-05-10 RX ORDER — IPRATROPIUM BROMIDE AND ALBUTEROL SULFATE 2.5; .5 MG/3ML; MG/3ML
3 SOLUTION RESPIRATORY (INHALATION) EVERY 6 HOURS PRN
Status: DISCONTINUED | OUTPATIENT
Start: 2021-05-10 | End: 2021-05-11 | Stop reason: SDUPTHER

## 2021-05-10 RX ORDER — ACETAMINOPHEN 325 MG/1
650 TABLET ORAL EVERY 4 HOURS PRN
Status: DISCONTINUED | OUTPATIENT
Start: 2021-05-10 | End: 2021-05-11

## 2021-05-10 RX ORDER — CETIRIZINE HYDROCHLORIDE 10 MG/1
5 TABLET ORAL DAILY
Status: DISCONTINUED | OUTPATIENT
Start: 2021-05-11 | End: 2021-05-17 | Stop reason: HOSPADM

## 2021-05-10 RX ORDER — ROPINIROLE 1 MG/1
3 TABLET, FILM COATED ORAL 3 TIMES DAILY
Status: DISCONTINUED | OUTPATIENT
Start: 2021-05-10 | End: 2021-05-17 | Stop reason: HOSPADM

## 2021-05-10 RX ORDER — SENNA PLUS 8.6 MG/1
1 TABLET ORAL NIGHTLY
Status: DISCONTINUED | OUTPATIENT
Start: 2021-05-10 | End: 2021-05-17 | Stop reason: HOSPADM

## 2021-05-10 RX ORDER — BISACODYL 5 MG/1
5 TABLET, DELAYED RELEASE ORAL DAILY PRN
Status: DISCONTINUED | OUTPATIENT
Start: 2021-05-10 | End: 2021-05-17 | Stop reason: HOSPADM

## 2021-05-10 RX ORDER — SIMETHICONE 80 MG
80 TABLET,CHEWABLE ORAL EVERY 6 HOURS PRN
Status: DISCONTINUED | OUTPATIENT
Start: 2021-05-10 | End: 2021-05-17 | Stop reason: HOSPADM

## 2021-05-10 RX ORDER — SODIUM CHLORIDE 0.9 % (FLUSH) 0.9 %
10 SYRINGE (ML) INJECTION EVERY 12 HOURS SCHEDULED
Status: DISCONTINUED | OUTPATIENT
Start: 2021-05-10 | End: 2021-05-17 | Stop reason: HOSPADM

## 2021-05-10 RX ORDER — AMOXICILLIN 250 MG
2 CAPSULE ORAL 2 TIMES DAILY
Status: DISCONTINUED | OUTPATIENT
Start: 2021-05-10 | End: 2021-05-17 | Stop reason: HOSPADM

## 2021-05-10 RX ORDER — BACLOFEN 10 MG/1
5 TABLET ORAL 2 TIMES DAILY
Status: DISCONTINUED | OUTPATIENT
Start: 2021-05-10 | End: 2021-05-17 | Stop reason: HOSPADM

## 2021-05-10 RX ADMIN — SODIUM CHLORIDE 500 ML: 9 INJECTION, SOLUTION INTRAVENOUS at 11:52

## 2021-05-10 RX ADMIN — BACLOFEN 5 MG: 10 TABLET ORAL at 20:38

## 2021-05-10 RX ADMIN — ARIPIPRAZOLE 2 MG: 2 TABLET ORAL at 20:39

## 2021-05-10 RX ADMIN — CARBIDOPA AND LEVODOPA 2 TABLET: 25; 100 TABLET ORAL at 20:37

## 2021-05-10 RX ADMIN — SENNOSIDES 1 TABLET: 8.6 TABLET, FILM COATED ORAL at 20:37

## 2021-05-10 RX ADMIN — ROPINIROLE HYDROCHLORIDE 3 MG: 1 TABLET, FILM COATED ORAL at 20:36

## 2021-05-10 RX ADMIN — DOXYCYCLINE 100 MG: 100 INJECTION, POWDER, LYOPHILIZED, FOR SOLUTION INTRAVENOUS at 15:41

## 2021-05-10 RX ADMIN — DOCUSATE SODIUM 50 MG AND SENNOSIDES 8.6 MG 2 TABLET: 8.6; 5 TABLET, FILM COATED ORAL at 20:37

## 2021-05-10 RX ADMIN — SODIUM CHLORIDE 100 ML/HR: 9 INJECTION, SOLUTION INTRAVENOUS at 20:35

## 2021-05-10 RX ADMIN — MORPHINE SULFATE 2 MG: 4 INJECTION INTRAVENOUS at 14:50

## 2021-05-10 RX ADMIN — PREGABALIN 50 MG: 25 CAPSULE ORAL at 20:37

## 2021-05-10 RX ADMIN — Medication 5 MG: at 20:38

## 2021-05-10 RX ADMIN — HYDROCODONE BITARTRATE AND ACETAMINOPHEN 1 TABLET: 7.5; 325 TABLET ORAL at 22:53

## 2021-05-10 RX ADMIN — DICLOFENAC SODIUM 4 G: 10 GEL TOPICAL at 20:35

## 2021-05-10 RX ADMIN — Medication 10 ML: at 20:39

## 2021-05-10 RX ADMIN — WATER 2 G: 100 INJECTION, SOLUTION INTRAVENOUS at 15:41

## 2021-05-10 RX ADMIN — LUBIPROSTONE 24 MCG: 24 CAPSULE, GELATIN COATED ORAL at 20:36

## 2021-05-11 ENCOUNTER — ANESTHESIA (OUTPATIENT)
Dept: GASTROENTEROLOGY | Facility: HOSPITAL | Age: 86
End: 2021-05-11

## 2021-05-11 ENCOUNTER — ANESTHESIA EVENT (OUTPATIENT)
Dept: GASTROENTEROLOGY | Facility: HOSPITAL | Age: 86
End: 2021-05-11

## 2021-05-11 ENCOUNTER — APPOINTMENT (OUTPATIENT)
Dept: GENERAL RADIOLOGY | Facility: HOSPITAL | Age: 86
End: 2021-05-11

## 2021-05-11 PROBLEM — L97.909 VENOUS ULCER (HCC): Status: ACTIVE | Noted: 2021-05-11

## 2021-05-11 PROBLEM — L97.912 NON-PRESSURE ULCER OF LOWER EXTREMITY, RIGHT, WITH FAT LAYER EXPOSED: Status: ACTIVE | Noted: 2021-05-11

## 2021-05-11 PROBLEM — I83.009 VENOUS ULCER: Status: ACTIVE | Noted: 2021-05-11

## 2021-05-11 PROBLEM — L97.922 NON-PRESSURE ULCER OF LEFT LOWER EXTREMITY WITH FAT LAYER EXPOSED: Status: ACTIVE | Noted: 2021-05-11

## 2021-05-11 LAB
ANION GAP SERPL CALCULATED.3IONS-SCNC: 9 MMOL/L (ref 5–15)
BASOPHILS # BLD AUTO: 0 10*3/MM3 (ref 0–0.2)
BASOPHILS NFR BLD AUTO: 0.4 % (ref 0–1.5)
BUN SERPL-MCNC: 25 MG/DL (ref 8–23)
BUN/CREAT SERPL: 23.6 (ref 7–25)
CALCIUM SPEC-SCNC: 8.4 MG/DL (ref 8.6–10.5)
CHLORIDE SERPL-SCNC: 107 MMOL/L (ref 98–107)
CO2 SERPL-SCNC: 24 MMOL/L (ref 22–29)
CREAT SERPL-MCNC: 1.06 MG/DL (ref 0.57–1)
DEPRECATED RDW RBC AUTO: 53.4 FL (ref 37–54)
EOSINOPHIL # BLD AUTO: 0.1 10*3/MM3 (ref 0–0.4)
EOSINOPHIL NFR BLD AUTO: 1.2 % (ref 0.3–6.2)
ERYTHROCYTE [DISTWIDTH] IN BLOOD BY AUTOMATED COUNT: 18 % (ref 12.3–15.4)
GFR SERPL CREATININE-BSD FRML MDRD: 49 ML/MIN/1.73
GLUCOSE SERPL-MCNC: 114 MG/DL (ref 65–99)
HCT VFR BLD AUTO: 18.7 % (ref 34–46.6)
HCT VFR BLD AUTO: 21 % (ref 34–46.6)
HGB BLD-MCNC: 6 G/DL (ref 12–15.9)
HGB BLD-MCNC: 6.7 G/DL (ref 12–15.9)
LYMPHOCYTES # BLD AUTO: 0.5 10*3/MM3 (ref 0.7–3.1)
LYMPHOCYTES NFR BLD AUTO: 10.1 % (ref 19.6–45.3)
MAGNESIUM SERPL-MCNC: 2.1 MG/DL (ref 1.6–2.4)
MCH RBC QN AUTO: 26.7 PG (ref 26.6–33)
MCHC RBC AUTO-ENTMCNC: 31.8 G/DL (ref 31.5–35.7)
MCV RBC AUTO: 83.8 FL (ref 79–97)
MONOCYTES # BLD AUTO: 0.3 10*3/MM3 (ref 0.1–0.9)
MONOCYTES NFR BLD AUTO: 5.7 % (ref 5–12)
NEUTROPHILS NFR BLD AUTO: 3.8 10*3/MM3 (ref 1.7–7)
NEUTROPHILS NFR BLD AUTO: 82.6 % (ref 42.7–76)
NRBC BLD AUTO-RTO: 0.3 /100 WBC (ref 0–0.2)
PLATELET # BLD AUTO: 188 10*3/MM3 (ref 140–450)
PMV BLD AUTO: 7.2 FL (ref 6–12)
POTASSIUM SERPL-SCNC: 4.4 MMOL/L (ref 3.5–5.2)
RBC # BLD AUTO: 2.23 10*6/MM3 (ref 3.77–5.28)
SODIUM SERPL-SCNC: 140 MMOL/L (ref 136–145)
WBC # BLD AUTO: 4.6 10*3/MM3 (ref 3.4–10.8)

## 2021-05-11 PROCEDURE — 25010000002 PROPOFOL 200 MG/20ML EMULSION: Performed by: ANESTHESIOLOGY

## 2021-05-11 PROCEDURE — 25010000002 MORPHINE PER 10 MG: Performed by: INTERNAL MEDICINE

## 2021-05-11 PROCEDURE — P9016 RBC LEUKOCYTES REDUCED: HCPCS

## 2021-05-11 PROCEDURE — 94640 AIRWAY INHALATION TREATMENT: CPT

## 2021-05-11 PROCEDURE — P9040 RBC LEUKOREDUCED IRRADIATED: HCPCS

## 2021-05-11 PROCEDURE — 71045 X-RAY EXAM CHEST 1 VIEW: CPT

## 2021-05-11 PROCEDURE — 83735 ASSAY OF MAGNESIUM: CPT | Performed by: INTERNAL MEDICINE

## 2021-05-11 PROCEDURE — 25010000002 CEFTRIAXONE PER 250 MG: Performed by: INTERNAL MEDICINE

## 2021-05-11 PROCEDURE — 94799 UNLISTED PULMONARY SVC/PX: CPT

## 2021-05-11 PROCEDURE — 80048 BASIC METABOLIC PNL TOTAL CA: CPT | Performed by: INTERNAL MEDICINE

## 2021-05-11 PROCEDURE — 99232 SBSQ HOSP IP/OBS MODERATE 35: CPT | Performed by: NURSE PRACTITIONER

## 2021-05-11 PROCEDURE — 85025 COMPLETE CBC W/AUTO DIFF WBC: CPT | Performed by: INTERNAL MEDICINE

## 2021-05-11 PROCEDURE — 25010000002 FUROSEMIDE PER 20 MG: Performed by: INTERNAL MEDICINE

## 2021-05-11 PROCEDURE — 85014 HEMATOCRIT: CPT | Performed by: INTERNAL MEDICINE

## 2021-05-11 PROCEDURE — 36430 TRANSFUSION BLD/BLD COMPNT: CPT

## 2021-05-11 PROCEDURE — 86900 BLOOD TYPING SEROLOGIC ABO: CPT

## 2021-05-11 PROCEDURE — 85018 HEMOGLOBIN: CPT | Performed by: INTERNAL MEDICINE

## 2021-05-11 PROCEDURE — 99233 SBSQ HOSP IP/OBS HIGH 50: CPT | Performed by: INTERNAL MEDICINE

## 2021-05-11 PROCEDURE — 0W3P8ZZ CONTROL BLEEDING IN GASTROINTESTINAL TRACT, VIA NATURAL OR ARTIFICIAL OPENING ENDOSCOPIC: ICD-10-PCS | Performed by: INTERNAL MEDICINE

## 2021-05-11 RX ORDER — IPRATROPIUM BROMIDE AND ALBUTEROL SULFATE 2.5; .5 MG/3ML; MG/3ML
3 SOLUTION RESPIRATORY (INHALATION) EVERY 4 HOURS PRN
Status: DISCONTINUED | OUTPATIENT
Start: 2021-05-11 | End: 2021-05-17 | Stop reason: HOSPADM

## 2021-05-11 RX ORDER — MORPHINE SULFATE 4 MG/ML
2 INJECTION, SOLUTION INTRAMUSCULAR; INTRAVENOUS EVERY 4 HOURS PRN
Status: DISCONTINUED | OUTPATIENT
Start: 2021-05-11 | End: 2021-05-11 | Stop reason: SDUPTHER

## 2021-05-11 RX ORDER — FUROSEMIDE 10 MG/ML
20 INJECTION INTRAMUSCULAR; INTRAVENOUS ONCE
Status: COMPLETED | OUTPATIENT
Start: 2021-05-11 | End: 2021-05-11

## 2021-05-11 RX ORDER — LIDOCAINE HYDROCHLORIDE 20 MG/ML
INJECTION, SOLUTION EPIDURAL; INFILTRATION; INTRACAUDAL; PERINEURAL AS NEEDED
Status: DISCONTINUED | OUTPATIENT
Start: 2021-05-11 | End: 2021-05-11 | Stop reason: SURG

## 2021-05-11 RX ORDER — SODIUM CHLORIDE 0.9 % (FLUSH) 0.9 %
3-10 SYRINGE (ML) INJECTION AS NEEDED
Status: DISCONTINUED | OUTPATIENT
Start: 2021-05-11 | End: 2021-05-11 | Stop reason: HOSPADM

## 2021-05-11 RX ORDER — SODIUM TETRADECYL SULFATE 30 MG/ML
INJECTION, SOLUTION INTRAVENOUS
Status: DISCONTINUED
Start: 2021-05-11 | End: 2021-05-11 | Stop reason: WASHOUT

## 2021-05-11 RX ORDER — SODIUM CHLORIDE 0.9 % (FLUSH) 0.9 %
10 SYRINGE (ML) INJECTION EVERY 12 HOURS SCHEDULED
Status: DISCONTINUED | OUTPATIENT
Start: 2021-05-11 | End: 2021-05-11 | Stop reason: HOSPADM

## 2021-05-11 RX ORDER — SODIUM CHLORIDE 9 MG/ML
9 INJECTION, SOLUTION INTRAVENOUS CONTINUOUS PRN
Status: DISCONTINUED | OUTPATIENT
Start: 2021-05-11 | End: 2021-05-17 | Stop reason: HOSPADM

## 2021-05-11 RX ORDER — DOXYCYCLINE 100 MG/1
100 TABLET ORAL EVERY 12 HOURS SCHEDULED
Status: DISCONTINUED | OUTPATIENT
Start: 2021-05-11 | End: 2021-05-17 | Stop reason: HOSPADM

## 2021-05-11 RX ORDER — SODIUM CHLORIDE 0.9 % (FLUSH) 0.9 %
3 SYRINGE (ML) INJECTION EVERY 12 HOURS SCHEDULED
Status: DISCONTINUED | OUTPATIENT
Start: 2021-05-11 | End: 2021-05-11 | Stop reason: HOSPADM

## 2021-05-11 RX ORDER — SUCRALFATE 1 G/1
1 TABLET ORAL
Status: DISCONTINUED | OUTPATIENT
Start: 2021-05-11 | End: 2021-05-17 | Stop reason: HOSPADM

## 2021-05-11 RX ORDER — PROPOFOL 10 MG/ML
INJECTION, EMULSION INTRAVENOUS AS NEEDED
Status: DISCONTINUED | OUTPATIENT
Start: 2021-05-11 | End: 2021-05-11 | Stop reason: SURG

## 2021-05-11 RX ORDER — EPINEPHRINE 0.1 MG/ML
SYRINGE (ML) INJECTION
Status: DISCONTINUED
Start: 2021-05-11 | End: 2021-05-11 | Stop reason: WASHOUT

## 2021-05-11 RX ORDER — ALCOHOL 0.98 ML/ML
INJECTION INTRASPINAL
Status: DISCONTINUED
Start: 2021-05-11 | End: 2021-05-11 | Stop reason: WASHOUT

## 2021-05-11 RX ORDER — SODIUM CHLORIDE 0.9 % (FLUSH) 0.9 %
10 SYRINGE (ML) INJECTION AS NEEDED
Status: DISCONTINUED | OUTPATIENT
Start: 2021-05-11 | End: 2021-05-11 | Stop reason: HOSPADM

## 2021-05-11 RX ORDER — SODIUM TETRADECYL SULFATE 30 MG/ML
INJECTION, SOLUTION INTRAVENOUS
Status: DISPENSED
Start: 2021-05-11 | End: 2021-05-12

## 2021-05-11 RX ADMIN — DICLOFENAC SODIUM 4 G: 10 GEL TOPICAL at 21:14

## 2021-05-11 RX ADMIN — PROPOFOL 25 MG: 10 INJECTION, EMULSION INTRAVENOUS at 15:01

## 2021-05-11 RX ADMIN — IPRATROPIUM BROMIDE AND ALBUTEROL SULFATE 3 ML: 2.5; .5 SOLUTION RESPIRATORY (INHALATION) at 07:21

## 2021-05-11 RX ADMIN — PREGABALIN 50 MG: 25 CAPSULE ORAL at 21:18

## 2021-05-11 RX ADMIN — ARIPIPRAZOLE 2 MG: 2 TABLET ORAL at 21:13

## 2021-05-11 RX ADMIN — IBUPROFEN 400 MG: 400 TABLET, FILM COATED ORAL at 02:03

## 2021-05-11 RX ADMIN — Medication 5 MG: at 21:13

## 2021-05-11 RX ADMIN — DOXYCYCLINE 100 MG: 100 INJECTION, POWDER, LYOPHILIZED, FOR SOLUTION INTRAVENOUS at 09:45

## 2021-05-11 RX ADMIN — Medication 10 ML: at 21:13

## 2021-05-11 RX ADMIN — PROPOFOL 25 MG: 10 INJECTION, EMULSION INTRAVENOUS at 14:59

## 2021-05-11 RX ADMIN — PROPOFOL 25 MG: 10 INJECTION, EMULSION INTRAVENOUS at 15:04

## 2021-05-11 RX ADMIN — Medication 10 ML: at 09:29

## 2021-05-11 RX ADMIN — IPRATROPIUM BROMIDE AND ALBUTEROL SULFATE 3 ML: 2.5; .5 SOLUTION RESPIRATORY (INHALATION) at 19:42

## 2021-05-11 RX ADMIN — SENNOSIDES 1 TABLET: 8.6 TABLET, FILM COATED ORAL at 21:14

## 2021-05-11 RX ADMIN — DOXYCYCLINE 100 MG: 100 TABLET, FILM COATED ORAL at 21:13

## 2021-05-11 RX ADMIN — CEFTRIAXONE SODIUM 2 G: 2 INJECTION, POWDER, FOR SOLUTION INTRAMUSCULAR; INTRAVENOUS at 12:30

## 2021-05-11 RX ADMIN — FUROSEMIDE 20 MG: 10 INJECTION, SOLUTION INTRAMUSCULAR; INTRAVENOUS at 21:13

## 2021-05-11 RX ADMIN — LIDOCAINE HYDROCHLORIDE 25 MG: 20 INJECTION, SOLUTION EPIDURAL; INFILTRATION; INTRACAUDAL; PERINEURAL at 14:58

## 2021-05-11 RX ADMIN — DICLOFENAC SODIUM 4 G: 10 GEL TOPICAL at 09:27

## 2021-05-11 RX ADMIN — HYDROCODONE BITARTRATE AND ACETAMINOPHEN 1 TABLET: 7.5; 325 TABLET ORAL at 07:49

## 2021-05-11 RX ADMIN — PROPOFOL 50 MG: 10 INJECTION, EMULSION INTRAVENOUS at 14:58

## 2021-05-11 RX ADMIN — MORPHINE SULFATE 2 MG: 4 INJECTION INTRAVENOUS at 12:30

## 2021-05-11 RX ADMIN — CARBIDOPA AND LEVODOPA 2 TABLET: 25; 100 TABLET ORAL at 21:13

## 2021-05-11 RX ADMIN — DOCUSATE SODIUM 50 MG AND SENNOSIDES 8.6 MG 2 TABLET: 8.6; 5 TABLET, FILM COATED ORAL at 21:12

## 2021-05-11 RX ADMIN — SUCRALFATE 1 G: 1 TABLET ORAL at 21:13

## 2021-05-11 RX ADMIN — ROPINIROLE HYDROCHLORIDE 3 MG: 1 TABLET, FILM COATED ORAL at 21:13

## 2021-05-11 RX ADMIN — SODIUM CHLORIDE 100 ML/HR: 9 INJECTION, SOLUTION INTRAVENOUS at 07:00

## 2021-05-11 RX ADMIN — BACLOFEN 5 MG: 10 TABLET ORAL at 21:13

## 2021-05-11 NOTE — ANESTHESIA PREPROCEDURE EVALUATION
Anesthesia Evaluation     Patient summary reviewed and Nursing notes reviewed   NPO Solid Status: > 6 hours  NPO Liquid Status: > 6 hours           Airway   Mallampati: II  TM distance: >3 FB  Neck ROM: full  No difficulty expected  Dental    (+) poor dentition    Pulmonary - normal exam    breath sounds clear to auscultation  (+) pneumonia , sleep apnea on CPAP,   Cardiovascular - negative cardio ROS and normal exam    ECG reviewed  Rhythm: regular  Rate: normal        Neuro/Psych  (+) weakness,     GI/Hepatic/Renal/Endo    (+)  GERD,  renal disease,     Musculoskeletal     (+) chronic pain,   Abdominal  - normal exam   Substance History - negative use     OB/GYN          Other        ROS/Med Hx Other: Wheelchair dependent                  Anesthesia Plan    ASA 3     MAC     intravenous induction     Anesthetic plan, all risks, benefits, and alternatives have been provided, discussed and informed consent has been obtained with: patient.

## 2021-05-11 NOTE — ANESTHESIA POSTPROCEDURE EVALUATION
Patient: Lorenza Owens    Procedure Summary     Date: 05/11/21 Room / Location: [facility] ENDOSCOPY 1 / [facility] END[facility]COPY    Anesthesia Start: 1454 Anesthesia Stop: 1515    Procedure: ESOPHAGOGASTRODUODENOSCOPY ARGON PLASMA COAGULATION (N/A ) Diagnosis:       Gastrointestinal hemorrhage with melena      (Gastrointestinal hemorrhage with melena [K92.1])    Surgeons: Lori Light MD Provider: Luis Rodriguez MD    Anesthesia Type: MAC ASA Status: 3          Anesthesia Type: MAC    Vitals  Vitals Value Taken Time   BP     Temp     Pulse 84 05/11/21 1515   Resp     SpO2     Vitals shown include unvalidated device data.        Post Anesthesia Care and Evaluation    Patient location during evaluation: PACU  Patient participation: complete - patient cannot participate  Level of consciousness: responsive to light touch and responsive to physical stimuli  Pain score: 0  Pain management: adequate  Airway patency: patent  Anesthetic complications: No anesthetic complications  PONV Status: controlled  Cardiovascular status: acceptable and hemodynamically stable  Respiratory status: acceptable and face mask  Hydration status: acceptable    Comments: Satisfactory progress.Patient seen and examined postoperatively; vital signs stable; SpO2 greater than or equal to 90%; cardiopulmonary status stable; nausea/vomiting adequately controlled; pain adequately controlled; no apparent anesthesia complications; patient discharged from anesthesia care when discharge criteria were met

## 2021-05-12 ENCOUNTER — HOSPITAL ENCOUNTER (OUTPATIENT)
Dept: CARDIOLOGY | Facility: HOSPITAL | Age: 86
End: 2021-05-12

## 2021-05-12 ENCOUNTER — APPOINTMENT (OUTPATIENT)
Dept: CT IMAGING | Facility: HOSPITAL | Age: 86
End: 2021-05-12

## 2021-05-12 LAB
ANION GAP SERPL CALCULATED.3IONS-SCNC: 8 MMOL/L (ref 5–15)
ARTERIAL PATENCY WRIST A: POSITIVE
ATMOSPHERIC PRESS: ABNORMAL MM[HG]
B PARAPERT DNA SPEC QL NAA+PROBE: NOT DETECTED
B PERT DNA SPEC QL NAA+PROBE: NOT DETECTED
BASE EXCESS BLDA CALC-SCNC: 1.7 MMOL/L (ref 0–3)
BASOPHILS # BLD AUTO: 0 10*3/MM3 (ref 0–0.2)
BASOPHILS NFR BLD AUTO: 0.6 % (ref 0–1.5)
BDY SITE: ABNORMAL
BH BB BLOOD EXPIRATION DATE: NORMAL
BH BB BLOOD TYPE BARCODE: 600
BH BB BLOOD TYPE BARCODE: 6200
BH BB DISPENSE STATUS: NORMAL
BH BB PRODUCT CODE: NORMAL
BH BB UNIT NUMBER: NORMAL
BUN SERPL-MCNC: 22 MG/DL (ref 8–23)
BUN/CREAT SERPL: 22 (ref 7–25)
C PNEUM DNA NPH QL NAA+NON-PROBE: NOT DETECTED
CALCIUM SPEC-SCNC: 8.7 MG/DL (ref 8.6–10.5)
CHLORIDE SERPL-SCNC: 108 MMOL/L (ref 98–107)
CO2 BLDA-SCNC: 29.8 MMOL/L (ref 22–29)
CO2 SERPL-SCNC: 25 MMOL/L (ref 22–29)
CREAT SERPL-MCNC: 1 MG/DL (ref 0.57–1)
CROSSMATCH INTERPRETATION: NORMAL
DEPRECATED RDW RBC AUTO: 52.9 FL (ref 37–54)
EOSINOPHIL # BLD AUTO: 0.1 10*3/MM3 (ref 0–0.4)
EOSINOPHIL NFR BLD AUTO: 1.8 % (ref 0.3–6.2)
ERYTHROCYTE [DISTWIDTH] IN BLOOD BY AUTOMATED COUNT: 17.6 % (ref 12.3–15.4)
FLUAV SUBTYP SPEC NAA+PROBE: NOT DETECTED
FLUBV RNA ISLT QL NAA+PROBE: NOT DETECTED
GFR SERPL CREATININE-BSD FRML MDRD: 53 ML/MIN/1.73
GLUCOSE SERPL-MCNC: 100 MG/DL (ref 65–99)
HADV DNA SPEC NAA+PROBE: NOT DETECTED
HCO3 BLDA-SCNC: 28.2 MMOL/L (ref 21–28)
HCOV 229E RNA SPEC QL NAA+PROBE: NOT DETECTED
HCOV HKU1 RNA SPEC QL NAA+PROBE: NOT DETECTED
HCOV NL63 RNA SPEC QL NAA+PROBE: NOT DETECTED
HCOV OC43 RNA SPEC QL NAA+PROBE: NOT DETECTED
HCT VFR BLD AUTO: 23.5 % (ref 34–46.6)
HEMODILUTION: NO
HGB BLD-MCNC: 7.6 G/DL (ref 12–15.9)
HMPV RNA NPH QL NAA+NON-PROBE: NOT DETECTED
HPIV1 RNA SPEC QL NAA+PROBE: NOT DETECTED
HPIV2 RNA SPEC QL NAA+PROBE: NOT DETECTED
HPIV3 RNA NPH QL NAA+PROBE: NOT DETECTED
HPIV4 P GENE NPH QL NAA+PROBE: NOT DETECTED
INHALED O2 CONCENTRATION: 36 %
L PNEUMO1 AG UR QL IA: NEGATIVE
LYMPHOCYTES # BLD AUTO: 0.4 10*3/MM3 (ref 0.7–3.1)
LYMPHOCYTES NFR BLD AUTO: 9.2 % (ref 19.6–45.3)
M PNEUMO IGG SER IA-ACNC: NOT DETECTED
MCH RBC QN AUTO: 27.3 PG (ref 26.6–33)
MCHC RBC AUTO-ENTMCNC: 32.3 G/DL (ref 31.5–35.7)
MCV RBC AUTO: 84.7 FL (ref 79–97)
MODALITY: ABNORMAL
MONOCYTES # BLD AUTO: 0.3 10*3/MM3 (ref 0.1–0.9)
MONOCYTES NFR BLD AUTO: 6.4 % (ref 5–12)
NEUTROPHILS NFR BLD AUTO: 3.6 10*3/MM3 (ref 1.7–7)
NEUTROPHILS NFR BLD AUTO: 82 % (ref 42.7–76)
NRBC BLD AUTO-RTO: 0.3 /100 WBC (ref 0–0.2)
PCO2 BLDA: 54 MM HG (ref 35–48)
PH BLDA: 7.33 PH UNITS (ref 7.35–7.45)
PLATELET # BLD AUTO: 189 10*3/MM3 (ref 140–450)
PMV BLD AUTO: 6.5 FL (ref 6–12)
PO2 BLDA: 88.1 MM HG (ref 83–108)
POTASSIUM SERPL-SCNC: 4.2 MMOL/L (ref 3.5–5.2)
QT INTERVAL: 383 MS
RBC # BLD AUTO: 2.78 10*6/MM3 (ref 3.77–5.28)
RHINOVIRUS RNA SPEC NAA+PROBE: NOT DETECTED
RSV RNA NPH QL NAA+NON-PROBE: NOT DETECTED
S PNEUM AG SPEC QL LA: NEGATIVE
SAO2 % BLDCOA: 95.8 % (ref 94–98)
SODIUM SERPL-SCNC: 141 MMOL/L (ref 136–145)
UNIT  ABO: NORMAL
UNIT  RH: NORMAL
WBC # BLD AUTO: 4.4 10*3/MM3 (ref 3.4–10.8)

## 2021-05-12 PROCEDURE — 82803 BLOOD GASES ANY COMBINATION: CPT

## 2021-05-12 PROCEDURE — 87899 AGENT NOS ASSAY W/OPTIC: CPT | Performed by: NURSE PRACTITIONER

## 2021-05-12 PROCEDURE — 87633 RESP VIRUS 12-25 TARGETS: CPT | Performed by: NURSE PRACTITIONER

## 2021-05-12 PROCEDURE — 85025 COMPLETE CBC W/AUTO DIFF WBC: CPT | Performed by: NURSE PRACTITIONER

## 2021-05-12 PROCEDURE — 25010000002 CEFTAZIDIME PER 500 MG: Performed by: INTERNAL MEDICINE

## 2021-05-12 PROCEDURE — 80048 BASIC METABOLIC PNL TOTAL CA: CPT | Performed by: NURSE PRACTITIONER

## 2021-05-12 PROCEDURE — 36600 WITHDRAWAL OF ARTERIAL BLOOD: CPT

## 2021-05-12 PROCEDURE — 71250 CT THORAX DX C-: CPT

## 2021-05-12 PROCEDURE — 99233 SBSQ HOSP IP/OBS HIGH 50: CPT | Performed by: INTERNAL MEDICINE

## 2021-05-12 PROCEDURE — 25010000002 CEFTRIAXONE PER 250 MG: Performed by: INTERNAL MEDICINE

## 2021-05-12 RX ORDER — HYDROCODONE BITARTRATE AND ACETAMINOPHEN 7.5; 325 MG/1; MG/1
1 TABLET ORAL EVERY 6 HOURS PRN
Status: DISCONTINUED | OUTPATIENT
Start: 2021-05-12 | End: 2021-05-17 | Stop reason: HOSPADM

## 2021-05-12 RX ORDER — HYDROCODONE BITARTRATE AND ACETAMINOPHEN 5; 325 MG/1; MG/1
1 TABLET ORAL ONCE AS NEEDED
Status: COMPLETED | OUTPATIENT
Start: 2021-05-12 | End: 2021-05-12

## 2021-05-12 RX ADMIN — ALLOPURINOL 100 MG: 100 TABLET ORAL at 09:29

## 2021-05-12 RX ADMIN — DOCUSATE SODIUM 50 MG AND SENNOSIDES 8.6 MG 2 TABLET: 8.6; 5 TABLET, FILM COATED ORAL at 20:55

## 2021-05-12 RX ADMIN — ROPINIROLE HYDROCHLORIDE 3 MG: 1 TABLET, FILM COATED ORAL at 09:29

## 2021-05-12 RX ADMIN — DICLOFENAC SODIUM 4 G: 10 GEL TOPICAL at 20:56

## 2021-05-12 RX ADMIN — Medication 10 ML: at 09:30

## 2021-05-12 RX ADMIN — HYDROCODONE BITARTRATE AND ACETAMINOPHEN 1 TABLET: 7.5; 325 TABLET ORAL at 16:26

## 2021-05-12 RX ADMIN — SUCRALFATE 1 G: 1 TABLET ORAL at 17:29

## 2021-05-12 RX ADMIN — HYDROCODONE BITARTRATE AND ACETAMINOPHEN 1 TABLET: 5; 325 TABLET ORAL at 01:10

## 2021-05-12 RX ADMIN — Medication 10 ML: at 20:55

## 2021-05-12 RX ADMIN — SUCRALFATE 1 G: 1 TABLET ORAL at 09:29

## 2021-05-12 RX ADMIN — ARIPIPRAZOLE 2 MG: 2 TABLET ORAL at 20:55

## 2021-05-12 RX ADMIN — BACLOFEN 5 MG: 10 TABLET ORAL at 20:55

## 2021-05-12 RX ADMIN — CARBIDOPA AND LEVODOPA 2 TABLET: 25; 100 TABLET ORAL at 15:26

## 2021-05-12 RX ADMIN — MAGNESIUM GLUCONATE 500 MG ORAL TABLET 400 MG: 500 TABLET ORAL at 09:29

## 2021-05-12 RX ADMIN — DOXYCYCLINE 100 MG: 100 TABLET, FILM COATED ORAL at 20:55

## 2021-05-12 RX ADMIN — CEFTRIAXONE SODIUM 2 G: 2 INJECTION, POWDER, FOR SOLUTION INTRAMUSCULAR; INTRAVENOUS at 11:31

## 2021-05-12 RX ADMIN — LUBIPROSTONE 24 MCG: 24 CAPSULE, GELATIN COATED ORAL at 17:29

## 2021-05-12 RX ADMIN — BACLOFEN 5 MG: 10 TABLET ORAL at 09:28

## 2021-05-12 RX ADMIN — ROPINIROLE HYDROCHLORIDE 3 MG: 1 TABLET, FILM COATED ORAL at 20:55

## 2021-05-12 RX ADMIN — ROPINIROLE HYDROCHLORIDE 3 MG: 1 TABLET, FILM COATED ORAL at 15:26

## 2021-05-12 RX ADMIN — Medication 5 MG: at 20:55

## 2021-05-12 RX ADMIN — DICLOFENAC SODIUM 4 G: 10 GEL TOPICAL at 09:29

## 2021-05-12 RX ADMIN — CETIRIZINE HYDROCHLORIDE 5 MG: 10 TABLET, FILM COATED ORAL at 09:29

## 2021-05-12 RX ADMIN — FENOFIBRATE 145 MG: 145 TABLET ORAL at 09:29

## 2021-05-12 RX ADMIN — PANTOPRAZOLE SODIUM 40 MG: 40 TABLET, DELAYED RELEASE ORAL at 09:28

## 2021-05-12 RX ADMIN — HYDROCODONE BITARTRATE AND ACETAMINOPHEN 1 TABLET: 7.5; 325 TABLET ORAL at 23:36

## 2021-05-12 RX ADMIN — SUCRALFATE 1 G: 1 TABLET ORAL at 20:55

## 2021-05-12 RX ADMIN — DULOXETINE HYDROCHLORIDE 60 MG: 30 CAPSULE, DELAYED RELEASE ORAL at 09:28

## 2021-05-12 RX ADMIN — CARBIDOPA AND LEVODOPA 2 TABLET: 25; 100 TABLET ORAL at 20:55

## 2021-05-12 RX ADMIN — CEFTAZIDIME 2 G: 2 INJECTION, POWDER, FOR SOLUTION INTRAVENOUS at 16:26

## 2021-05-12 RX ADMIN — LEVOTHYROXINE SODIUM 137 MCG: 0.03 TABLET ORAL at 09:28

## 2021-05-12 RX ADMIN — LUBIPROSTONE 24 MCG: 24 CAPSULE, GELATIN COATED ORAL at 09:29

## 2021-05-12 RX ADMIN — DOXYCYCLINE 100 MG: 100 TABLET, FILM COATED ORAL at 09:29

## 2021-05-12 RX ADMIN — DOCUSATE SODIUM 50 MG AND SENNOSIDES 8.6 MG 2 TABLET: 8.6; 5 TABLET, FILM COATED ORAL at 09:28

## 2021-05-12 RX ADMIN — PREGABALIN 50 MG: 25 CAPSULE ORAL at 20:55

## 2021-05-12 RX ADMIN — SUCRALFATE 1 G: 1 TABLET ORAL at 11:30

## 2021-05-12 RX ADMIN — CARBIDOPA AND LEVODOPA 2 TABLET: 25; 100 TABLET ORAL at 09:29

## 2021-05-13 ENCOUNTER — APPOINTMENT (OUTPATIENT)
Dept: CARDIOLOGY | Facility: HOSPITAL | Age: 86
End: 2021-05-13

## 2021-05-13 LAB
ALBUMIN SERPL-MCNC: 3.1 G/DL (ref 3.5–5.2)
ALBUMIN/GLOB SERPL: 0.9 G/DL
ALP SERPL-CCNC: 53 U/L (ref 39–117)
ALT SERPL W P-5'-P-CCNC: <5 U/L (ref 1–33)
ANION GAP SERPL CALCULATED.3IONS-SCNC: 8 MMOL/L (ref 5–15)
ARTERIAL PATENCY WRIST A: POSITIVE
AST SERPL-CCNC: 18 U/L (ref 1–32)
ATMOSPHERIC PRESS: ABNORMAL MM[HG]
BASE EXCESS BLDA CALC-SCNC: 3.3 MMOL/L (ref 0–3)
BASOPHILS # BLD AUTO: 0 10*3/MM3 (ref 0–0.2)
BASOPHILS NFR BLD AUTO: 0.1 % (ref 0–1.5)
BDY SITE: ABNORMAL
BH CV ECHO MEAS - % IVS THICK: 75.9 %
BH CV ECHO MEAS - % LVPW THICK: 71.6 %
BH CV ECHO MEAS - ACS: 1.6 CM
BH CV ECHO MEAS - AO MAX PG (FULL): 20 MMHG
BH CV ECHO MEAS - AO MAX PG: 30.9 MMHG
BH CV ECHO MEAS - AO MEAN PG (FULL): 11.3 MMHG
BH CV ECHO MEAS - AO MEAN PG: 16.8 MMHG
BH CV ECHO MEAS - AO ROOT AREA (BSA CORRECTED): 1.6
BH CV ECHO MEAS - AO ROOT AREA: 6.8 CM^2
BH CV ECHO MEAS - AO ROOT DIAM: 2.9 CM
BH CV ECHO MEAS - AO V2 MAX: 274.7 CM/SEC
BH CV ECHO MEAS - AO V2 MEAN: 190.2 CM/SEC
BH CV ECHO MEAS - AO V2 VTI: 52.6 CM
BH CV ECHO MEAS - AVA(I,A): 1.7 CM^2
BH CV ECHO MEAS - AVA(I,D): 1.7 CM^2
BH CV ECHO MEAS - AVA(V,A): 1.7 CM^2
BH CV ECHO MEAS - AVA(V,D): 1.7 CM^2
BH CV ECHO MEAS - BSA(HAYCOCK): 2.1 M^2
BH CV ECHO MEAS - BSA: 1.9 M^2
BH CV ECHO MEAS - BZI_BMI: 40.6 KILOGRAMS/M^2
BH CV ECHO MEAS - BZI_METRIC_HEIGHT: 152.4 CM
BH CV ECHO MEAS - BZI_METRIC_WEIGHT: 94.3 KG
BH CV ECHO MEAS - EDV(CUBED): 138.6 ML
BH CV ECHO MEAS - EDV(MOD-SP4): 62 ML
BH CV ECHO MEAS - EDV(TEICH): 128.1 ML
BH CV ECHO MEAS - EF(CUBED): 88.9 %
BH CV ECHO MEAS - EF(MOD-BP): 74 %
BH CV ECHO MEAS - EF(MOD-SP4): 74 %
BH CV ECHO MEAS - EF(TEICH): 82.8 %
BH CV ECHO MEAS - ESV(CUBED): 15.3 ML
BH CV ECHO MEAS - ESV(MOD-SP4): 16.2 ML
BH CV ECHO MEAS - ESV(TEICH): 22 ML
BH CV ECHO MEAS - FS: 52 %
BH CV ECHO MEAS - IVS/LVPW: 1.1
BH CV ECHO MEAS - IVSD: 1 CM
BH CV ECHO MEAS - IVSS: 1.8 CM
BH CV ECHO MEAS - LA DIMENSION(2D): 4.7 CM
BH CV ECHO MEAS - LV DIASTOLIC VOL/BSA (35-75): 32.7 ML/M^2
BH CV ECHO MEAS - LV MASS(C)D: 194.6 GRAMS
BH CV ECHO MEAS - LV MASS(C)DI: 102.5 GRAMS/M^2
BH CV ECHO MEAS - LV MASS(C)S: 166.4 GRAMS
BH CV ECHO MEAS - LV MASS(C)SI: 87.7 GRAMS/M^2
BH CV ECHO MEAS - LV MAX PG: 10.9 MMHG
BH CV ECHO MEAS - LV MEAN PG: 5.4 MMHG
BH CV ECHO MEAS - LV SYSTOLIC VOL/BSA (12-30): 8.5 ML/M^2
BH CV ECHO MEAS - LV V1 MAX: 165.1 CM/SEC
BH CV ECHO MEAS - LV V1 MEAN: 107.3 CM/SEC
BH CV ECHO MEAS - LV V1 VTI: 31.6 CM
BH CV ECHO MEAS - LVIDD: 5.2 CM
BH CV ECHO MEAS - LVIDS: 2.5 CM
BH CV ECHO MEAS - LVOT AREA: 2.8 CM^2
BH CV ECHO MEAS - LVOT DIAM: 1.9 CM
BH CV ECHO MEAS - LVPWD: 0.98 CM
BH CV ECHO MEAS - LVPWS: 1.7 CM
BH CV ECHO MEAS - MV A MAX VEL: 161 CM/SEC
BH CV ECHO MEAS - MV DEC SLOPE: 660.6 CM/SEC^2
BH CV ECHO MEAS - MV DEC TIME: 0.24 SEC
BH CV ECHO MEAS - MV E MAX VEL: 161.5 CM/SEC
BH CV ECHO MEAS - MV E/A: 1
BH CV ECHO MEAS - MV MAX PG: 14.8 MMHG
BH CV ECHO MEAS - MV MEAN PG: 7.2 MMHG
BH CV ECHO MEAS - MV V2 MAX: 192.1 CM/SEC
BH CV ECHO MEAS - MV V2 MEAN: 127.9 CM/SEC
BH CV ECHO MEAS - MV V2 VTI: 49.1 CM
BH CV ECHO MEAS - MVA(VTI): 1.8 CM^2
BH CV ECHO MEAS - PA ACC TIME: 0.09 SEC
BH CV ECHO MEAS - PA MAX PG (FULL): 5.2 MMHG
BH CV ECHO MEAS - PA MAX PG: 12 MMHG
BH CV ECHO MEAS - PA MEAN PG (FULL): 3.2 MMHG
BH CV ECHO MEAS - PA MEAN PG: 6.7 MMHG
BH CV ECHO MEAS - PA PR(ACCEL): 38.5 MMHG
BH CV ECHO MEAS - PA V2 MAX: 173.1 CM/SEC
BH CV ECHO MEAS - PA V2 MEAN: 124 CM/SEC
BH CV ECHO MEAS - PA V2 VTI: 33.9 CM
BH CV ECHO MEAS - PULM A REVS DUR: 0.08 SEC
BH CV ECHO MEAS - PULM A REVS VEL: 34.5 CM/SEC
BH CV ECHO MEAS - PULM DIAS VEL: 73 CM/SEC
BH CV ECHO MEAS - PULM S/D: 0.77
BH CV ECHO MEAS - PULM SYS VEL: 56.2 CM/SEC
BH CV ECHO MEAS - RAP SYSTOLE: 3 MMHG
BH CV ECHO MEAS - RV MAX PG: 6.7 MMHG
BH CV ECHO MEAS - RV MEAN PG: 3.5 MMHG
BH CV ECHO MEAS - RV V1 MAX: 129.8 CM/SEC
BH CV ECHO MEAS - RV V1 MEAN: 87.4 CM/SEC
BH CV ECHO MEAS - RV V1 VTI: 25.7 CM
BH CV ECHO MEAS - RVDD: 2.6 CM
BH CV ECHO MEAS - RVSP: 56.8 MMHG
BH CV ECHO MEAS - SI(AO): 189.5 ML/M^2
BH CV ECHO MEAS - SI(CUBED): 64.9 ML/M^2
BH CV ECHO MEAS - SI(LVOT): 47.2 ML/M^2
BH CV ECHO MEAS - SI(MOD-SP4): 24.2 ML/M^2
BH CV ECHO MEAS - SI(TEICH): 55.9 ML/M^2
BH CV ECHO MEAS - SV(AO): 359.7 ML
BH CV ECHO MEAS - SV(CUBED): 123.3 ML
BH CV ECHO MEAS - SV(LVOT): 89.6 ML
BH CV ECHO MEAS - SV(MOD-SP4): 45.9 ML
BH CV ECHO MEAS - SV(TEICH): 106.1 ML
BH CV ECHO MEAS - TR MAX VEL: 366.4 CM/SEC
BILIRUB SERPL-MCNC: 0.5 MG/DL (ref 0–1.2)
BUN SERPL-MCNC: 17 MG/DL (ref 8–23)
BUN/CREAT SERPL: 22.4 (ref 7–25)
CALCIUM SPEC-SCNC: 9.1 MG/DL (ref 8.6–10.5)
CHLORIDE SERPL-SCNC: 108 MMOL/L (ref 98–107)
CO2 BLDA-SCNC: 31.8 MMOL/L (ref 22–29)
CO2 SERPL-SCNC: 25 MMOL/L (ref 22–29)
CREAT SERPL-MCNC: 0.76 MG/DL (ref 0.57–1)
DEPRECATED RDW RBC AUTO: 54.7 FL (ref 37–54)
EOSINOPHIL # BLD AUTO: 0.1 10*3/MM3 (ref 0–0.4)
EOSINOPHIL NFR BLD AUTO: 2.4 % (ref 0.3–6.2)
ERYTHROCYTE [DISTWIDTH] IN BLOOD BY AUTOMATED COUNT: 18.2 % (ref 12.3–15.4)
GFR SERPL CREATININE-BSD FRML MDRD: 73 ML/MIN/1.73
GLOBULIN UR ELPH-MCNC: 3.6 GM/DL
GLUCOSE SERPL-MCNC: 122 MG/DL (ref 65–99)
HCO3 BLDA-SCNC: 30.1 MMOL/L (ref 21–28)
HCT VFR BLD AUTO: 23.4 % (ref 34–46.6)
HEMODILUTION: NO
HGB BLD-MCNC: 7.6 G/DL (ref 12–15.9)
INHALED O2 CONCENTRATION: 32 %
LV EF 2D ECHO EST: 75 %
LYMPHOCYTES # BLD AUTO: 0.5 10*3/MM3 (ref 0.7–3.1)
LYMPHOCYTES NFR BLD AUTO: 9 % (ref 19.6–45.3)
MAXIMAL PREDICTED HEART RATE: 136 BPM
MCH RBC QN AUTO: 27.4 PG (ref 26.6–33)
MCHC RBC AUTO-ENTMCNC: 32.5 G/DL (ref 31.5–35.7)
MCV RBC AUTO: 84.2 FL (ref 79–97)
MODALITY: ABNORMAL
MONOCYTES # BLD AUTO: 0.4 10*3/MM3 (ref 0.1–0.9)
MONOCYTES NFR BLD AUTO: 8.6 % (ref 5–12)
NEUTROPHILS NFR BLD AUTO: 4 10*3/MM3 (ref 1.7–7)
NEUTROPHILS NFR BLD AUTO: 79.9 % (ref 42.7–76)
NRBC BLD AUTO-RTO: 0.1 /100 WBC (ref 0–0.2)
PCO2 BLDA: 56.8 MM HG (ref 35–48)
PH BLDA: 7.33 PH UNITS (ref 7.35–7.45)
PLATELET # BLD AUTO: 194 10*3/MM3 (ref 140–450)
PMV BLD AUTO: 6.4 FL (ref 6–12)
PO2 BLDA: 88.1 MM HG (ref 83–108)
POTASSIUM SERPL-SCNC: 3.8 MMOL/L (ref 3.5–5.2)
PROT SERPL-MCNC: 6.7 G/DL (ref 6–8.5)
RBC # BLD AUTO: 2.78 10*6/MM3 (ref 3.77–5.28)
SAO2 % BLDCOA: 95.8 % (ref 94–98)
SODIUM SERPL-SCNC: 141 MMOL/L (ref 136–145)
STRESS TARGET HR: 116 BPM
WBC # BLD AUTO: 5 10*3/MM3 (ref 3.4–10.8)

## 2021-05-13 PROCEDURE — 80053 COMPREHEN METABOLIC PANEL: CPT | Performed by: INTERNAL MEDICINE

## 2021-05-13 PROCEDURE — 87070 CULTURE OTHR SPECIMN AEROBIC: CPT | Performed by: NURSE PRACTITIONER

## 2021-05-13 PROCEDURE — 36600 WITHDRAWAL OF ARTERIAL BLOOD: CPT

## 2021-05-13 PROCEDURE — 87205 SMEAR GRAM STAIN: CPT | Performed by: NURSE PRACTITIONER

## 2021-05-13 PROCEDURE — 97162 PT EVAL MOD COMPLEX 30 MIN: CPT

## 2021-05-13 PROCEDURE — 93306 TTE W/DOPPLER COMPLETE: CPT | Performed by: INTERNAL MEDICINE

## 2021-05-13 PROCEDURE — 97165 OT EVAL LOW COMPLEX 30 MIN: CPT

## 2021-05-13 PROCEDURE — 82962 GLUCOSE BLOOD TEST: CPT

## 2021-05-13 PROCEDURE — 97530 THERAPEUTIC ACTIVITIES: CPT

## 2021-05-13 PROCEDURE — 94799 UNLISTED PULMONARY SVC/PX: CPT

## 2021-05-13 PROCEDURE — 97535 SELF CARE MNGMENT TRAINING: CPT

## 2021-05-13 PROCEDURE — 99233 SBSQ HOSP IP/OBS HIGH 50: CPT | Performed by: INTERNAL MEDICINE

## 2021-05-13 PROCEDURE — 93306 TTE W/DOPPLER COMPLETE: CPT

## 2021-05-13 PROCEDURE — 82803 BLOOD GASES ANY COMBINATION: CPT

## 2021-05-13 PROCEDURE — 85025 COMPLETE CBC W/AUTO DIFF WBC: CPT | Performed by: INTERNAL MEDICINE

## 2021-05-13 PROCEDURE — 25010000002 CEFTAZIDIME PER 500 MG: Performed by: INTERNAL MEDICINE

## 2021-05-13 RX ORDER — BUDESONIDE 0.5 MG/2ML
0.5 INHALANT ORAL
Status: DISCONTINUED | OUTPATIENT
Start: 2021-05-13 | End: 2021-05-17 | Stop reason: HOSPADM

## 2021-05-13 RX ORDER — IPRATROPIUM BROMIDE AND ALBUTEROL SULFATE 2.5; .5 MG/3ML; MG/3ML
3 SOLUTION RESPIRATORY (INHALATION)
Status: DISCONTINUED | OUTPATIENT
Start: 2021-05-13 | End: 2021-05-17 | Stop reason: HOSPADM

## 2021-05-13 RX ADMIN — SUCRALFATE 1 G: 1 TABLET ORAL at 17:34

## 2021-05-13 RX ADMIN — PREGABALIN 50 MG: 25 CAPSULE ORAL at 21:44

## 2021-05-13 RX ADMIN — BACLOFEN 5 MG: 10 TABLET ORAL at 21:38

## 2021-05-13 RX ADMIN — ROPINIROLE HYDROCHLORIDE 3 MG: 1 TABLET, FILM COATED ORAL at 21:38

## 2021-05-13 RX ADMIN — SENNOSIDES 1 TABLET: 8.6 TABLET, FILM COATED ORAL at 21:38

## 2021-05-13 RX ADMIN — SUCRALFATE 1 G: 1 TABLET ORAL at 21:38

## 2021-05-13 RX ADMIN — DULOXETINE HYDROCHLORIDE 60 MG: 30 CAPSULE, DELAYED RELEASE ORAL at 09:29

## 2021-05-13 RX ADMIN — ARIPIPRAZOLE 2 MG: 2 TABLET ORAL at 21:38

## 2021-05-13 RX ADMIN — HYDROCODONE BITARTRATE AND ACETAMINOPHEN 1 TABLET: 7.5; 325 TABLET ORAL at 21:37

## 2021-05-13 RX ADMIN — LUBIPROSTONE 24 MCG: 24 CAPSULE, GELATIN COATED ORAL at 07:37

## 2021-05-13 RX ADMIN — CARBIDOPA AND LEVODOPA 2 TABLET: 25; 100 TABLET ORAL at 21:38

## 2021-05-13 RX ADMIN — CEFTAZIDIME 2 G: 2 INJECTION, POWDER, FOR SOLUTION INTRAVENOUS at 21:38

## 2021-05-13 RX ADMIN — SUCRALFATE 1 G: 1 TABLET ORAL at 07:37

## 2021-05-13 RX ADMIN — HYDROCODONE BITARTRATE AND ACETAMINOPHEN 1 TABLET: 7.5; 325 TABLET ORAL at 13:20

## 2021-05-13 RX ADMIN — PANTOPRAZOLE SODIUM 40 MG: 40 TABLET, DELAYED RELEASE ORAL at 07:37

## 2021-05-13 RX ADMIN — ALLOPURINOL 100 MG: 100 TABLET ORAL at 09:29

## 2021-05-13 RX ADMIN — ROPINIROLE HYDROCHLORIDE 3 MG: 1 TABLET, FILM COATED ORAL at 16:11

## 2021-05-13 RX ADMIN — IPRATROPIUM BROMIDE AND ALBUTEROL SULFATE 3 ML: 2.5; .5 SOLUTION RESPIRATORY (INHALATION) at 20:30

## 2021-05-13 RX ADMIN — CARBIDOPA AND LEVODOPA 2 TABLET: 25; 100 TABLET ORAL at 16:11

## 2021-05-13 RX ADMIN — Medication 5 MG: at 21:38

## 2021-05-13 RX ADMIN — ROPINIROLE HYDROCHLORIDE 3 MG: 1 TABLET, FILM COATED ORAL at 09:29

## 2021-05-13 RX ADMIN — CEFTAZIDIME 2 G: 2 INJECTION, POWDER, FOR SOLUTION INTRAVENOUS at 14:22

## 2021-05-13 RX ADMIN — DOXYCYCLINE 100 MG: 100 TABLET, FILM COATED ORAL at 09:24

## 2021-05-13 RX ADMIN — CETIRIZINE HYDROCHLORIDE 5 MG: 10 TABLET, FILM COATED ORAL at 09:24

## 2021-05-13 RX ADMIN — FENOFIBRATE 145 MG: 145 TABLET ORAL at 09:25

## 2021-05-13 RX ADMIN — DICLOFENAC SODIUM 4 G: 10 GEL TOPICAL at 09:19

## 2021-05-13 RX ADMIN — DICLOFENAC SODIUM 4 G: 10 GEL TOPICAL at 21:39

## 2021-05-13 RX ADMIN — HYDROCODONE BITARTRATE AND ACETAMINOPHEN 1 TABLET: 7.5; 325 TABLET ORAL at 06:28

## 2021-05-13 RX ADMIN — BUDESONIDE 0.5 MG: 0.5 SUSPENSION RESPIRATORY (INHALATION) at 20:23

## 2021-05-13 RX ADMIN — Medication 10 ML: at 09:19

## 2021-05-13 RX ADMIN — LUBIPROSTONE 24 MCG: 24 CAPSULE, GELATIN COATED ORAL at 17:35

## 2021-05-13 RX ADMIN — BACLOFEN 5 MG: 10 TABLET ORAL at 09:26

## 2021-05-13 RX ADMIN — CARBIDOPA AND LEVODOPA 2 TABLET: 25; 100 TABLET ORAL at 09:29

## 2021-05-13 RX ADMIN — LEVOTHYROXINE SODIUM 137 MCG: 0.03 TABLET ORAL at 09:25

## 2021-05-13 RX ADMIN — DOCUSATE SODIUM 50 MG AND SENNOSIDES 8.6 MG 2 TABLET: 8.6; 5 TABLET, FILM COATED ORAL at 21:38

## 2021-05-13 RX ADMIN — DOXYCYCLINE 100 MG: 100 TABLET, FILM COATED ORAL at 21:38

## 2021-05-13 RX ADMIN — MAGNESIUM GLUCONATE 500 MG ORAL TABLET 400 MG: 500 TABLET ORAL at 09:29

## 2021-05-13 RX ADMIN — SUCRALFATE 1 G: 1 TABLET ORAL at 11:23

## 2021-05-13 RX ADMIN — Medication 10 ML: at 21:39

## 2021-05-13 RX ADMIN — CEFTAZIDIME 2 G: 2 INJECTION, POWDER, FOR SOLUTION INTRAVENOUS at 05:40

## 2021-05-14 LAB
ANION GAP SERPL CALCULATED.3IONS-SCNC: 7 MMOL/L (ref 5–15)
ARTERIAL PATENCY WRIST A: POSITIVE
ATMOSPHERIC PRESS: ABNORMAL MM[HG]
BASE EXCESS BLDA CALC-SCNC: 6 MMOL/L (ref 0–3)
BASOPHILS # BLD AUTO: 0 10*3/MM3 (ref 0–0.2)
BASOPHILS NFR BLD AUTO: 0.6 % (ref 0–1.5)
BDY SITE: ABNORMAL
BUN SERPL-MCNC: 16 MG/DL (ref 8–23)
BUN/CREAT SERPL: 21.3 (ref 7–25)
CALCIUM SPEC-SCNC: 9.1 MG/DL (ref 8.6–10.5)
CHLORIDE SERPL-SCNC: 107 MMOL/L (ref 98–107)
CO2 BLDA-SCNC: 35.1 MMOL/L (ref 22–29)
CO2 SERPL-SCNC: 29 MMOL/L (ref 22–29)
CREAT SERPL-MCNC: 0.75 MG/DL (ref 0.57–1)
DEPRECATED RDW RBC AUTO: 55.1 FL (ref 37–54)
EOSINOPHIL # BLD AUTO: 0.2 10*3/MM3 (ref 0–0.4)
EOSINOPHIL NFR BLD AUTO: 3.9 % (ref 0.3–6.2)
ERYTHROCYTE [DISTWIDTH] IN BLOOD BY AUTOMATED COUNT: 18.6 % (ref 12.3–15.4)
GFR SERPL CREATININE-BSD FRML MDRD: 74 ML/MIN/1.73
GLUCOSE BLDC GLUCOMTR-MCNC: 136 MG/DL (ref 70–105)
GLUCOSE SERPL-MCNC: 133 MG/DL (ref 65–99)
HCO3 BLDA-SCNC: 33.2 MMOL/L (ref 21–28)
HCT VFR BLD AUTO: 24.3 % (ref 34–46.6)
HEMODILUTION: NO
HGB BLD-MCNC: 7.9 G/DL (ref 12–15.9)
INHALED O2 CONCENTRATION: 36 %
LYMPHOCYTES # BLD AUTO: 0.7 10*3/MM3 (ref 0.7–3.1)
LYMPHOCYTES NFR BLD AUTO: 14.7 % (ref 19.6–45.3)
MCH RBC QN AUTO: 27.2 PG (ref 26.6–33)
MCHC RBC AUTO-ENTMCNC: 32.3 G/DL (ref 31.5–35.7)
MCV RBC AUTO: 84.3 FL (ref 79–97)
MODALITY: ABNORMAL
MONOCYTES # BLD AUTO: 0.4 10*3/MM3 (ref 0.1–0.9)
MONOCYTES NFR BLD AUTO: 9.9 % (ref 5–12)
NEUTROPHILS NFR BLD AUTO: 3.2 10*3/MM3 (ref 1.7–7)
NEUTROPHILS NFR BLD AUTO: 70.9 % (ref 42.7–76)
NRBC BLD AUTO-RTO: 0.2 /100 WBC (ref 0–0.2)
PCO2 BLDA: 61.3 MM HG (ref 35–48)
PH BLDA: 7.34 PH UNITS (ref 7.35–7.45)
PLATELET # BLD AUTO: 194 10*3/MM3 (ref 140–450)
PMV BLD AUTO: 7 FL (ref 6–12)
PO2 BLDA: 92 MM HG (ref 83–108)
POTASSIUM SERPL-SCNC: 3.9 MMOL/L (ref 3.5–5.2)
RBC # BLD AUTO: 2.88 10*6/MM3 (ref 3.77–5.28)
SAO2 % BLDCOA: 96.3 % (ref 94–98)
SODIUM SERPL-SCNC: 143 MMOL/L (ref 136–145)
WBC # BLD AUTO: 4.5 10*3/MM3 (ref 3.4–10.8)

## 2021-05-14 PROCEDURE — 25010000002 CEFTAZIDIME PER 500 MG: Performed by: INTERNAL MEDICINE

## 2021-05-14 PROCEDURE — 94799 UNLISTED PULMONARY SVC/PX: CPT

## 2021-05-14 PROCEDURE — 85025 COMPLETE CBC W/AUTO DIFF WBC: CPT | Performed by: NURSE PRACTITIONER

## 2021-05-14 PROCEDURE — 97530 THERAPEUTIC ACTIVITIES: CPT

## 2021-05-14 PROCEDURE — 36600 WITHDRAWAL OF ARTERIAL BLOOD: CPT

## 2021-05-14 PROCEDURE — 80048 BASIC METABOLIC PNL TOTAL CA: CPT | Performed by: NURSE PRACTITIONER

## 2021-05-14 PROCEDURE — 82803 BLOOD GASES ANY COMBINATION: CPT

## 2021-05-14 PROCEDURE — 99233 SBSQ HOSP IP/OBS HIGH 50: CPT | Performed by: INTERNAL MEDICINE

## 2021-05-14 RX ADMIN — DOXYCYCLINE 100 MG: 100 TABLET, FILM COATED ORAL at 21:12

## 2021-05-14 RX ADMIN — MAGNESIUM GLUCONATE 500 MG ORAL TABLET 400 MG: 500 TABLET ORAL at 08:43

## 2021-05-14 RX ADMIN — HYDROCODONE BITARTRATE AND ACETAMINOPHEN 1 TABLET: 7.5; 325 TABLET ORAL at 21:21

## 2021-05-14 RX ADMIN — BUDESONIDE 0.5 MG: 0.5 SUSPENSION RESPIRATORY (INHALATION) at 18:55

## 2021-05-14 RX ADMIN — DOCUSATE SODIUM 50 MG AND SENNOSIDES 8.6 MG 2 TABLET: 8.6; 5 TABLET, FILM COATED ORAL at 21:12

## 2021-05-14 RX ADMIN — CEFTAZIDIME 2 G: 2 INJECTION, POWDER, FOR SOLUTION INTRAVENOUS at 13:58

## 2021-05-14 RX ADMIN — SUCRALFATE 1 G: 1 TABLET ORAL at 17:19

## 2021-05-14 RX ADMIN — ARIPIPRAZOLE 2 MG: 2 TABLET ORAL at 21:11

## 2021-05-14 RX ADMIN — ROPINIROLE HYDROCHLORIDE 3 MG: 1 TABLET, FILM COATED ORAL at 08:42

## 2021-05-14 RX ADMIN — CARBIDOPA AND LEVODOPA 2 TABLET: 25; 100 TABLET ORAL at 08:43

## 2021-05-14 RX ADMIN — IPRATROPIUM BROMIDE AND ALBUTEROL SULFATE 3 ML: 2.5; .5 SOLUTION RESPIRATORY (INHALATION) at 18:50

## 2021-05-14 RX ADMIN — PANTOPRAZOLE SODIUM 40 MG: 40 TABLET, DELAYED RELEASE ORAL at 06:02

## 2021-05-14 RX ADMIN — ROPINIROLE HYDROCHLORIDE 3 MG: 1 TABLET, FILM COATED ORAL at 17:19

## 2021-05-14 RX ADMIN — BACLOFEN 5 MG: 10 TABLET ORAL at 08:42

## 2021-05-14 RX ADMIN — CETIRIZINE HYDROCHLORIDE 5 MG: 10 TABLET, FILM COATED ORAL at 08:42

## 2021-05-14 RX ADMIN — IPRATROPIUM BROMIDE AND ALBUTEROL SULFATE 3 ML: 2.5; .5 SOLUTION RESPIRATORY (INHALATION) at 11:30

## 2021-05-14 RX ADMIN — FENOFIBRATE 145 MG: 145 TABLET ORAL at 08:42

## 2021-05-14 RX ADMIN — ALLOPURINOL 100 MG: 100 TABLET ORAL at 08:43

## 2021-05-14 RX ADMIN — LEVOTHYROXINE SODIUM 137 MCG: 0.03 TABLET ORAL at 08:42

## 2021-05-14 RX ADMIN — HYDROCODONE BITARTRATE AND ACETAMINOPHEN 1 TABLET: 7.5; 325 TABLET ORAL at 04:33

## 2021-05-14 RX ADMIN — DICLOFENAC SODIUM 4 G: 10 GEL TOPICAL at 08:43

## 2021-05-14 RX ADMIN — DOXYCYCLINE 100 MG: 100 TABLET, FILM COATED ORAL at 08:42

## 2021-05-14 RX ADMIN — SUCRALFATE 1 G: 1 TABLET ORAL at 12:03

## 2021-05-14 RX ADMIN — SUCRALFATE 1 G: 1 TABLET ORAL at 08:42

## 2021-05-14 RX ADMIN — LUBIPROSTONE 24 MCG: 24 CAPSULE, GELATIN COATED ORAL at 08:43

## 2021-05-14 RX ADMIN — CEFTAZIDIME 2 G: 2 INJECTION, POWDER, FOR SOLUTION INTRAVENOUS at 05:55

## 2021-05-14 RX ADMIN — IPRATROPIUM BROMIDE AND ALBUTEROL SULFATE 3 ML: 2.5; .5 SOLUTION RESPIRATORY (INHALATION) at 07:34

## 2021-05-14 RX ADMIN — CARBIDOPA AND LEVODOPA 2 TABLET: 25; 100 TABLET ORAL at 21:12

## 2021-05-14 RX ADMIN — ROPINIROLE HYDROCHLORIDE 3 MG: 1 TABLET, FILM COATED ORAL at 21:21

## 2021-05-14 RX ADMIN — CEFTAZIDIME 2 G: 2 INJECTION, POWDER, FOR SOLUTION INTRAVENOUS at 21:13

## 2021-05-14 RX ADMIN — PREGABALIN 50 MG: 25 CAPSULE ORAL at 21:21

## 2021-05-14 RX ADMIN — SUCRALFATE 1 G: 1 TABLET ORAL at 21:12

## 2021-05-14 RX ADMIN — Medication 10 ML: at 08:43

## 2021-05-14 RX ADMIN — BUDESONIDE 0.5 MG: 0.5 SUSPENSION RESPIRATORY (INHALATION) at 07:34

## 2021-05-14 RX ADMIN — CARBIDOPA AND LEVODOPA 2 TABLET: 25; 100 TABLET ORAL at 17:19

## 2021-05-14 RX ADMIN — Medication 5 MG: at 21:12

## 2021-05-14 RX ADMIN — LUBIPROSTONE 24 MCG: 24 CAPSULE, GELATIN COATED ORAL at 17:20

## 2021-05-14 RX ADMIN — DOCUSATE SODIUM 50 MG AND SENNOSIDES 8.6 MG 2 TABLET: 8.6; 5 TABLET, FILM COATED ORAL at 08:42

## 2021-05-14 RX ADMIN — HYDROCODONE BITARTRATE AND ACETAMINOPHEN 1 TABLET: 7.5; 325 TABLET ORAL at 12:03

## 2021-05-14 RX ADMIN — DULOXETINE HYDROCHLORIDE 60 MG: 30 CAPSULE, DELAYED RELEASE ORAL at 08:42

## 2021-05-14 RX ADMIN — DICLOFENAC SODIUM 4 G: 10 GEL TOPICAL at 21:13

## 2021-05-14 RX ADMIN — BACLOFEN 5 MG: 10 TABLET ORAL at 21:11

## 2021-05-14 RX ADMIN — Medication 10 ML: at 21:14

## 2021-05-15 LAB
BACTERIA SPEC AEROBE CULT: NORMAL
BACTERIA SPEC AEROBE CULT: NORMAL
BACTERIA SPEC RESP CULT: NORMAL
GRAM STN SPEC: NORMAL

## 2021-05-15 PROCEDURE — 94799 UNLISTED PULMONARY SVC/PX: CPT

## 2021-05-15 PROCEDURE — 99233 SBSQ HOSP IP/OBS HIGH 50: CPT | Performed by: INTERNAL MEDICINE

## 2021-05-15 PROCEDURE — 25010000002 CEFTAZIDIME PER 500 MG: Performed by: INTERNAL MEDICINE

## 2021-05-15 RX ADMIN — DICLOFENAC SODIUM 4 G: 10 GEL TOPICAL at 22:01

## 2021-05-15 RX ADMIN — ROPINIROLE HYDROCHLORIDE 3 MG: 1 TABLET, FILM COATED ORAL at 21:59

## 2021-05-15 RX ADMIN — BUDESONIDE 0.5 MG: 0.5 SUSPENSION RESPIRATORY (INHALATION) at 06:37

## 2021-05-15 RX ADMIN — HYDROCODONE BITARTRATE AND ACETAMINOPHEN 1 TABLET: 7.5; 325 TABLET ORAL at 11:56

## 2021-05-15 RX ADMIN — BACLOFEN 5 MG: 10 TABLET ORAL at 21:59

## 2021-05-15 RX ADMIN — Medication 5 MG: at 21:59

## 2021-05-15 RX ADMIN — DOCUSATE SODIUM 50 MG AND SENNOSIDES 8.6 MG 2 TABLET: 8.6; 5 TABLET, FILM COATED ORAL at 21:59

## 2021-05-15 RX ADMIN — CARBIDOPA AND LEVODOPA 2 TABLET: 25; 100 TABLET ORAL at 09:20

## 2021-05-15 RX ADMIN — CARBIDOPA AND LEVODOPA 2 TABLET: 25; 100 TABLET ORAL at 21:59

## 2021-05-15 RX ADMIN — CEFTAZIDIME 2 G: 2 INJECTION, POWDER, FOR SOLUTION INTRAVENOUS at 05:00

## 2021-05-15 RX ADMIN — Medication 10 ML: at 22:00

## 2021-05-15 RX ADMIN — HYDROCODONE BITARTRATE AND ACETAMINOPHEN 1 TABLET: 7.5; 325 TABLET ORAL at 04:59

## 2021-05-15 RX ADMIN — DOCUSATE SODIUM 50 MG AND SENNOSIDES 8.6 MG 2 TABLET: 8.6; 5 TABLET, FILM COATED ORAL at 09:19

## 2021-05-15 RX ADMIN — ROPINIROLE HYDROCHLORIDE 3 MG: 1 TABLET, FILM COATED ORAL at 16:17

## 2021-05-15 RX ADMIN — SUCRALFATE 1 G: 1 TABLET ORAL at 09:20

## 2021-05-15 RX ADMIN — SUCRALFATE 1 G: 1 TABLET ORAL at 16:18

## 2021-05-15 RX ADMIN — HYDROCODONE BITARTRATE AND ACETAMINOPHEN 1 TABLET: 7.5; 325 TABLET ORAL at 21:59

## 2021-05-15 RX ADMIN — SUCRALFATE 1 G: 1 TABLET ORAL at 21:59

## 2021-05-15 RX ADMIN — Medication 10 ML: at 09:21

## 2021-05-15 RX ADMIN — CETIRIZINE HYDROCHLORIDE 5 MG: 10 TABLET, FILM COATED ORAL at 09:19

## 2021-05-15 RX ADMIN — CARBIDOPA AND LEVODOPA 2 TABLET: 25; 100 TABLET ORAL at 16:18

## 2021-05-15 RX ADMIN — ARIPIPRAZOLE 2 MG: 2 TABLET ORAL at 21:59

## 2021-05-15 RX ADMIN — PANTOPRAZOLE SODIUM 40 MG: 40 TABLET, DELAYED RELEASE ORAL at 09:19

## 2021-05-15 RX ADMIN — DICLOFENAC SODIUM 4 G: 10 GEL TOPICAL at 09:21

## 2021-05-15 RX ADMIN — LUBIPROSTONE 24 MCG: 24 CAPSULE, GELATIN COATED ORAL at 16:17

## 2021-05-15 RX ADMIN — IPRATROPIUM BROMIDE AND ALBUTEROL SULFATE 3 ML: 2.5; .5 SOLUTION RESPIRATORY (INHALATION) at 14:54

## 2021-05-15 RX ADMIN — DOXYCYCLINE 100 MG: 100 TABLET, FILM COATED ORAL at 21:59

## 2021-05-15 RX ADMIN — BACLOFEN 5 MG: 10 TABLET ORAL at 09:19

## 2021-05-15 RX ADMIN — DULOXETINE HYDROCHLORIDE 60 MG: 30 CAPSULE, DELAYED RELEASE ORAL at 09:19

## 2021-05-15 RX ADMIN — LUBIPROSTONE 24 MCG: 24 CAPSULE, GELATIN COATED ORAL at 09:20

## 2021-05-15 RX ADMIN — ALLOPURINOL 100 MG: 100 TABLET ORAL at 09:20

## 2021-05-15 RX ADMIN — DOXYCYCLINE 100 MG: 100 TABLET, FILM COATED ORAL at 09:20

## 2021-05-15 RX ADMIN — CEFTAZIDIME 2 G: 2 INJECTION, POWDER, FOR SOLUTION INTRAVENOUS at 22:00

## 2021-05-15 RX ADMIN — CEFTAZIDIME 2 G: 2 INJECTION, POWDER, FOR SOLUTION INTRAVENOUS at 16:18

## 2021-05-15 RX ADMIN — MAGNESIUM GLUCONATE 500 MG ORAL TABLET 400 MG: 500 TABLET ORAL at 09:20

## 2021-05-15 RX ADMIN — IPRATROPIUM BROMIDE AND ALBUTEROL SULFATE 3 ML: 2.5; .5 SOLUTION RESPIRATORY (INHALATION) at 06:37

## 2021-05-15 RX ADMIN — SUCRALFATE 1 G: 1 TABLET ORAL at 11:56

## 2021-05-15 RX ADMIN — FENOFIBRATE 145 MG: 145 TABLET ORAL at 09:20

## 2021-05-15 RX ADMIN — PREGABALIN 50 MG: 25 CAPSULE ORAL at 21:59

## 2021-05-15 RX ADMIN — LEVOTHYROXINE SODIUM 137 MCG: 0.03 TABLET ORAL at 09:20

## 2021-05-15 RX ADMIN — ROPINIROLE HYDROCHLORIDE 3 MG: 1 TABLET, FILM COATED ORAL at 09:20

## 2021-05-15 RX ADMIN — IPRATROPIUM BROMIDE AND ALBUTEROL SULFATE 3 ML: 2.5; .5 SOLUTION RESPIRATORY (INHALATION) at 10:44

## 2021-05-15 NOTE — PROGRESS NOTES
Infectious Diseases Progress Note      LOS: 5 days   Patient Care Team:  Shikha Mcmahon APRN as PCP - General (Family Medicine)    Chief Complaint: Shortness of breath, fatigue    Subjective       The patient has been afebrile for the last 24 hours.  The patient is on 4 L of oxygen by nasal cannula, hemodynamically stable, and is tolerating antimicrobial therapy.      Review of Systems:   Review of Systems   Constitutional: Positive for fatigue.   HENT: Negative.    Eyes: Negative.    Respiratory: Positive for cough and shortness of breath.    Cardiovascular: Positive for leg swelling.   Gastrointestinal: Negative.    Endocrine: Negative.    Genitourinary: Negative.    Musculoskeletal: Negative.    Skin: Positive for wound.   Neurological: Positive for weakness.   Psychiatric/Behavioral: Negative.    All other systems reviewed and are negative.       Objective     Vital Signs  Temp:  [97.3 °F (36.3 °C)-98.5 °F (36.9 °C)] 97.8 °F (36.6 °C)  Heart Rate:  [77-90] 90  Resp:  [16-20] 18  BP: (121-138)/(53-60) 138/60    Physical Exam:  Physical Exam  Vitals and nursing note reviewed.   Constitutional:       General: She is not in acute distress.     Appearance: Normal appearance. She is well-developed and normal weight. She is ill-appearing. She is not diaphoretic.   HENT:      Head: Normocephalic and atraumatic.   Eyes:      General: No scleral icterus.     Extraocular Movements: Extraocular movements intact.      Conjunctiva/sclera: Conjunctivae normal.      Pupils: Pupils are equal, round, and reactive to light.   Cardiovascular:      Rate and Rhythm: Normal rate and regular rhythm.      Heart sounds: Normal heart sounds, S1 normal and S2 normal. No murmur heard.     Pulmonary:      Effort: Pulmonary effort is normal. No respiratory distress.      Breath sounds: No stridor. Rales present. No wheezing.   Chest:      Chest wall: No tenderness.   Abdominal:      General: Bowel sounds are normal. There is no distension.       Palpations: Abdomen is soft. There is no mass.      Tenderness: There is no abdominal tenderness. There is no guarding.   Genitourinary:     Comments: External catheter  Musculoskeletal:         General: No swelling, tenderness or deformity. Normal range of motion.      Cervical back: Neck supple.   Skin:     General: Skin is warm and dry.      Coloration: Skin is pale.      Findings: No bruising, erythema or rash.      Comments: Chronic venous stasis changes to both lower legs with some shallow ulcerations   Neurological:      General: No focal deficit present.      Mental Status: She is alert and oriented to person, place, and time. Mental status is at baseline.      Cranial Nerves: No cranial nerve deficit.   Psychiatric:         Mood and Affect: Mood normal.          Results Review:    I have reviewed all clinical data, test, lab, and imaging results.     Radiology  No Radiology Exams Resulted Within Past 24 Hours    Cardiology    Laboratory    Results from last 7 days   Lab Units 05/14/21  0235 05/13/21  0234 05/12/21  0238 05/11/21  0935 05/11/21  0220 05/10/21  1151   WBC 10*3/mm3 4.50 5.00 4.40  --  4.60 3.50   HEMOGLOBIN g/dL 7.9* 7.6* 7.6* 6.7* 6.0* 4.4*   HEMATOCRIT % 24.3* 23.4* 23.5* 21.0* 18.7* 13.6*   PLATELETS 10*3/mm3 194 194 189  --  188 189     Results from last 7 days   Lab Units 05/14/21  0235 05/13/21  0234 05/12/21  0238 05/11/21  0220 05/10/21  1151   SODIUM mmol/L 143 141 141 140 142   POTASSIUM mmol/L 3.9 3.8 4.2 4.4 4.7   CHLORIDE mmol/L 107 108* 108* 107 109*   CO2 mmol/L 29.0 25.0 25.0 24.0 25.0   BUN mg/dL 16 17 22 25* 28*   CREATININE mg/dL 0.75 0.76 1.00 1.06* 1.26*   GLUCOSE mg/dL 133* 122* 100* 114* 110*   ALBUMIN g/dL  --  3.10*  --   --  3.40*   BILIRUBIN mg/dL  --  0.5  --   --  0.5   ALK PHOS U/L  --  53  --   --  57   AST (SGOT) U/L  --  18  --   --  12   ALT (SGPT) U/L  --  <5  --   --  <5   CALCIUM mg/dL 9.1 9.1 8.7 8.4* 8.7                 [facility]   Microbiology  Results (last 10 days)     Procedure Component Value - Date/Time    Respiratory Culture - Sputum, Cough [[id number]] Collected: 05/13/21 1134    Lab Status: Final result Specimen: Sputum from Cough Updated: 05/15/21 1033     Respiratory Culture Light growth (2+) Normal Respiratory Mohini: NO S.aureus/MRSA or Pseudomonas aeruginosa     Gram Stain Moderate (3+) WBCs per low power field      Few (2+) Gram positive cocci in clusters      Rare (1+) Gram positive cocci in pairs    Legionella Antigen, Urine - Urine, Urine, Clean Catch [[id number]]  (Normal) Collected: 05/12/21 1707    Lab Status: Final result Specimen: Urine, Clean Catch Updated: 05/12/21 1743     LEGIONELLA ANTIGEN, URINE Negative    S. Pneumo Ag Urine or CSF - Urine, Urine, Clean Catch [[id number]]  (Normal) Collected: 05/12/21 1707    Lab Status: Final result Specimen: Urine, Clean Catch Updated: 05/12/21 1743     Strep Pneumo Ag Negative    Respiratory Panel, PCR (WITHOUT COVID) - Swab, Nasopharynx [[id number]]  (Normal) Collected: 05/12/21 1436    Lab Status: Final result Specimen: Swab from Nasopharynx Updated: 05/12/21 1542     ADENOVIRUS, PCR Not Detected     Coronavirus 229E Not Detected     Coronavirus HKU1 Not Detected     Coronavirus NL63 Not Detected     Coronavirus OC43 Not Detected     Human Metapneumovirus Not Detected     Human Rhinovirus/Enterovirus Not Detected     Influenza B PCR Not Detected     Parainfluenza Virus 1 Not Detected     Parainfluenza Virus 2 Not Detected     Parainfluenza Virus 3 Not Detected     Parainfluenza Virus 4 Not Detected     Bordetella pertussis pcr Not Detected     Chlamydophila pneumoniae PCR Not Detected     Mycoplasma pneumo by PCR Not Detected     Influenza A PCR Not Detected     RSV, PCR Not Detected     Bordetella parapertussis PCR Not Detected    Narrative:      The coronavirus on the RVP is NOT COVID-19 and is NOT indicative of infection with COVID-19.     Blood Culture - Blood, Arm, Right [[id number]]  Collected: 05/10/21 1523    Lab Status: Preliminary result Specimen: Blood from Arm, Right Updated: 05/14/21 1530     Blood Culture No growth at 4 days    COVID-19, [facility]-HOUSE,NASAL Swab (NO TRANSPORT MEDIA) 2 HR TAT - Swab, Nasopharynx [[id number]]  (Normal) Collected: 05/10/21 1503    Lab Status: Final result Specimen: Swab from Nasopharynx Updated: 05/10/21 1544     COVID19 Presumptive Negative    Narrative:      Fact sheet for providers: https://www.fda.gov/media/018158/download     Fact sheet for patients: https://www.fda.gov/media/781654/download    Test performed by PCR.  If inconsistent with clinical signs and symptoms patient should be tested with different authorized molecular test.    Blood Culture - Blood, Arm, Right [[id number]] Collected: 05/10/21 1348    Lab Status: Final result Specimen: Blood from Arm, Right Updated: 05/15/21 1400     Blood Culture No growth at 5 days          Medication Review:       Schedule Meds  allopurinol, 100 mg, Oral, Daily  ARIPiprazole, 2 mg, Oral, Nightly  baclofen, 5 mg, Oral, BID  budesonide, 0.5 mg, Nebulization, BID - RT  carbidopa-levodopa, 2 tablet, Oral, TID  cefTAZidime, 2 g, Intravenous, Q8H  cetirizine, 5 mg, Oral, Daily  Diclofenac Sodium, 4 g, Topical, BID  doxycycline, 100 mg, Oral, Q12H  DULoxetine, 60 mg, Oral, Daily  fenofibrate, 145 mg, Oral, Daily  ipratropium-albuterol, 3 mL, Nebulization, 4x Daily - RT  levothyroxine, 137 mcg, Oral, Daily  lubiprostone, 24 mcg, Oral, BID With Meals  magnesium oxide, 400 mg, Oral, Daily  melatonin, 5 mg, Oral, Nightly  pantoprazole, 40 mg, Oral, QAM  pregabalin, 50 mg, Oral, Nightly  rOPINIRole, 3 mg, Oral, TID  senna, 1 tablet, Oral, Nightly  senna-docusate sodium, 2 tablet, Oral, BID  sodium chloride, 10 mL, Intravenous, Q12H  sucralfate, 1 g, Oral, 4x Daily AC & at Bedtime        Infusion Meds  sodium chloride, 100 mL/hr, Last Rate: Stopped (05/11/21 2130)  sodium chloride, 9 mL/hr        PRN Meds  senna-docusate  sodium **AND** polyethylene glycol **AND** bisacodyl **AND** bisacodyl  •  HYDROcodone-acetaminophen  •  ipratropium-albuterol  •  ondansetron **OR** ondansetron  •  simethicone  •  [COMPLETED] Insert peripheral IV **AND** sodium chloride  •  sodium chloride  •  sodium chloride        Assessment/Plan       Antimicrobial Therapy   1.  Doxycycline      day  2.  Fortaz      day  3.      Day  4.      Day  5.      Day      Assessment     Multifocal pneumonia.  Patient is currently on 3 days of oxygen.  Patient is baseline at the nursing home was room air  COVID-19 screen was negative on admission  -Currently on 3 L of oxygen by nasal cannula  -Pneumococcal and Legionella urine antigen was negative, respiratory viral DNA panel was negative     Severe anemia-hemoglobin was 4.1 at admission and an EGD completed on 5/11/2021 showed AV malformations which were cauterized.  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  Patient has superficial venous stasis ulceration on the right leg with some greenish drainage.  Culture prior to admission grew Pseudomonas aeruginosa and MRSA     Peripheral vascular disease        Plan     Continue doxycycline 100 mg p.o. every 12 hours  Continue ceftazidime 2 g IV every 12 hours  Recommend 10 days of the above antimicrobial therapy  Patient will need a midline before discharge-please remove when IV antibiotics are completed  Incentive spirometer  Continue supportive care  A.m. labs  Okay to discharge from Infectious Disease standpoint to rehab          Nadya Carpenter, APRN  05/15/21  14:01 EDT      Note is dictated utilizing voice recognition software/Dragon

## 2021-05-15 NOTE — PLAN OF CARE
Problem: Adult Inpatient Plan of Care  Goal: Plan of Care Review  Outcome: Ongoing, Progressing  Flowsheets  Taken 5/15/2021 0958 by Kait Davis, RN  Outcome Summary: Pt on 4L NC. Plan for [facility] at discharge. Will need midline placed and 10 days antibiotics per ID. No distress noted. Will continue to monitor.  Taken 5/14/2021 1504 by Chloe Cruz, RN  Plan of Care Reviewed With: patient   Goal Outcome Evaluation:        Outcome Summary: Pt on 4L NC. Plan for [facility] skilled at discharge. Will need midline placed and 10 days antibiotics per ID. No distress noted. Will continue to monitor.

## 2021-05-15 NOTE — PROGRESS NOTES
[facility] Daily Progress Note      Hospitalist Team  LOS 5 days      Patient Care Team:  Shikha Mcmahon APRN as PCP - General (Family Medicine)    Patient Location: 2113/1      Subjective   Subjective     Chief Complaint / Subjective  Chief Complaint   Patient presents with   • Abnormal Lab         Brief Synopsis of Hospital Course/HPI  Patient is an 86-year-old female with past medical history of GERD, fibromyalgia, polyosteoarthritis, bedbound for more than 10 years, using  wheelchair for transportation, hypothyroidism, anemia, depression, bilateral lower extremity edema, hemorrhoids, hyperlipidemia, chronic low back pain, wheelchair dependence, CLAUDIA, peripheral vascular insufficiency, bilateral lower extremity cellulitis who presented to the emergency room because of abnormal labs, hemoglobin 4.1 and shortness of breath.  Patient was seen in the emergency room and Hemoccult was negative.  Patient reported having dark tarry stools 2 weeks prior to presentation to the emergency room.  Patient was seen in the emergency room and was diagnosed with GI bleed and a right lower lobe pneumonia.  Patient was recommended for admission for further treatment and management.  Patient was started on a transfusion of packed red blood cells in the emergency room prior to transfer to PCU.    GI consult was completed.  Pulmonary and ID consults were completed.    No overnight events noted.        Date:: 5/15/2021.        Review of Systems   Constitutional: Negative.   HENT: Negative.    Eyes: Negative.    Cardiovascular: Negative.    Respiratory: Negative.    Endocrine: Negative.    Hematologic/Lymphatic: Negative.    Skin: Negative.    Musculoskeletal: Negative.    Gastrointestinal: Negative.    Genitourinary: Negative.    Neurological: Negative.    Psychiatric/Behavioral: Negative.    Allergic/Immunologic: Negative.          Objective   Objective      Vital Signs  Temp:  [97.3 °F (36.3  "°C)-98.5 °F (36.9 °C)] 97.8 °F (36.6 °C)  Heart Rate:  [77-90] 90  Resp:  [16-20] 18  BP: (121-138)/(53-60) 138/60  Oxygen Therapy  SpO2: 100 %  Pulse Oximetry Type: Continuous  Device (Oxygen Therapy): nasal cannula  Flow (L/min): 3  ETCO2 (mmHg): 32 mmHg  Flowsheet Rows      First Filed Value   Admission Height  152.4 cm (60\") Documented at 05/10/2021 1122   Admission Weight  104 kg (230 lb) Documented at 05/10/2021 1122        Intake & Output (last 3 days)       05/12 0701 - 05/13 0700 05/13 0701 - 05/14 0700 05/14 0701 - 05/15 0700 05/15 0701 - 05/16 0700    P.O. 480 240 120     Blood        Total Intake(mL/kg) 480 (5.1) 240 (2.6) 120 (1.2)     Urine (mL/kg/hr) 1100 (0.5) 2200 (1) 750 (0.3)     Stool 0       Total Output 1100 2200 750     Net -620 -1960 -630             Urine Unmeasured Occurrence   2 x     Stool Unmeasured Occurrence 2 x   2 x        Lines, Drains & Airways    Active LDAs     Name:   Placement date:   Placement time:   Site:   Days:    Peripheral IV 05/10/21 1151 Left Antecubital   05/10/21    1151    Antecubital   less than 1    Peripheral IV 05/10/21 1152 Right Antecubital   05/10/21    1152    Antecubital   less than 1    External Urinary Catheter   05/10/21    2000    --   less than 1                  Physical Exam:        Physical Exam  Constitutional:       General: She is not in acute distress.     Comments: Patient was somnolent but easily aroused.   HENT:      Head: Normocephalic and atraumatic.      Nose: Nose normal. No congestion or rhinorrhea.      Mouth/Throat:      Mouth: Mucous membranes are moist.      Pharynx: Oropharynx is clear. No oropharyngeal exudate or posterior oropharyngeal erythema.   Eyes:      Pupils: Pupils are equal, round, and reactive to light.   Cardiovascular:      Pulses: Normal pulses.      Heart sounds: No murmur heard.   No friction rub. No gallop.       Comments: S1 and S2 present.  No tachycardia.  Pulmonary:      Effort: No respiratory distress.      " Breath sounds: No wheezing or rales.      Comments: Good air entry bilaterally.  Mild basilar crackles.  Chest:      Chest wall: No tenderness.   Abdominal:      General: Abdomen is flat. Bowel sounds are normal. There is no distension.      Palpations: Abdomen is soft.      Tenderness: There is no right CVA tenderness.   Musculoskeletal:         General: No swelling, tenderness, deformity or signs of injury.      Cervical back: Neck supple. No tenderness.      Right lower leg: No edema.      Left lower leg: No edema.   Skin:     Capillary Refill: Capillary refill takes less than 2 seconds.      Coloration: Skin is not jaundiced.      Findings: No bruising, lesion or rash.   Neurological:      Comments: No facial asymmetry noted.  Gait and station not tested.    Psychiatric:      Comments: No agitation.               Wounds (last 24 hours)      LDA Wound     Row Name 05/15/21 1200 05/15/21 0805 05/15/21 0400       Wound 05/10/21 1833 Bilateral lower leg    Wound - Properties Group Placement Date: 05/10/21  -CL Placement Time: 1833  -CL Side: Bilateral  -CL Orientation: lower  -CL Location: leg  -CL Stage, Pressure Injury : other (see comments)  -CL    Closure  OLIVE  -MV  OLIVE  -MV  OLIVE  -KK    Base  dressing in place, unable to visualize  -MV  dressing in place, unable to visualize  -MV  dressing in place, unable to visualize  -KK    Periwound  dry;intact;blanchable;warm  -MV  dry;intact;blanchable;warm  -MV  dry;intact;blanchable;warm  -KK    Periwound Temperature  warm  -MV  warm  -MV  warm  -KK    Periwound Skin Turgor  firm  -MV  firm  -MV  firm  -KK    Drainage Amount  none  -MV  none  -MV  none  -KK    Retired Wound - Properties Group Date first assessed: 05/10/21  -CL Time first assessed: 1833 -CL Side: Bilateral  -CL Location: leg  -CL    Row Name 05/15/21 0000 05/14/21 2000 05/14/21 1612       Wound 05/10/21 1833 Bilateral lower leg    Wound - Properties Group Placement Date: 05/10/21  -CL Placement Time:  1833 -CL Side: Bilateral  -CL Orientation: lower  -CL Location: leg  -CL Stage, Pressure Injury : other (see comments)  -CL    Closure  OLIVE  -KK  OLIVE  -KK  OLIVE  -AD    Base  dressing in place, unable to visualize  -KK  dressing in place, unable to visualize  -KK  dressing in place, unable to visualize  -AD    Periwound  dry;intact;blanchable;warm  -KK  dry;intact;blanchable;warm  -KK  dry;intact;blanchable;warm  -AD    Periwound Temperature  warm  -KK  warm  -KK  warm  -AD    Periwound Skin Turgor  firm  -KK  firm  -KK  firm  -AD    Drainage Amount  none  -KK  none  -KK  none  -AD    Retired Wound - Properties Group Date first assessed: 05/10/21  -CL Time first assessed: 1833 -CL Side: Bilateral  -CL Location: leg  -CL    Row Name 05/14/21 1421             Wound 05/10/21 1833 Bilateral lower leg    Wound - Properties Group Placement Date: 05/10/21  -CL Placement Time: 1833 -CL Side: Bilateral  -CL Orientation: lower  -CL Location: leg  -CL Stage, Pressure Injury : other (see comments)  -CL    Dressing Appearance  dried drainage  -AD      Base  bleeding  -AD      Care, Wound  cleansed with;sterile normal saline  -AD      Dressing Care  dressing changed  -AD      Retired Wound - Properties Group Date first assessed: 05/10/21  -CL Time first assessed: 1833 -CL Side: Bilateral  -CL Location: leg  -CL      User Key  (r) = Recorded By, (t) = Taken By, (c) = Cosigned By    Initials Name Provider Type    Bertha Diaz RN Registered Nurse    Kristine Estes RN Registered Nurse    Kait Quintanilla RN Registered Nurse    Chloe Viramontes RN Registered Nurse          Procedures:    Procedure(s):  ESOPHAGOGASTRODUODENOSCOPY          Results Review:     I reviewed the patient's new clinical results.      Lab Results (last 24 hours)     Procedure Component Value Units Date/Time    Respiratory Culture - Sputum, Cough [[id number]] Collected: 05/13/21 1134    Specimen: Sputum from Cough Updated: 05/15/21 1034      Respiratory Culture Light growth (2+) Normal Respiratory Mohini: NO S.aureus/MRSA or Pseudomonas aeruginosa     Gram Stain Moderate (3+) WBCs per low power field      Few (2+) Gram positive cocci in clusters      Rare (1+) Gram positive cocci in pairs        No results found for: HGBA1C  Results from last 7 days   Lab Units 05/10/21  1151   INR  1.21*       Results from last 7 days   Lab Units 05/14/21  1130   PH, ARTERIAL pH units 7.341*   PO2 ART mm Hg 92.0   PCO2, ARTERIAL mm Hg 61.3*   HCO3 ART mmol/L 33.2*     Lab Results   Component Value Date    LIPASE 27 03/25/2021     No results found for: CHOL, CHLPL, TRIG, HDL, LDL, LDLDIRECT    No results found for: INTRAOP, PREDX, FINALDX, COMDX    Microbiology Results (last 10 days)     Procedure Component Value - Date/Time    Respiratory Culture - Sputum, Cough [[id number]] Collected: 05/13/21 1134    Lab Status: Final result Specimen: Sputum from Cough Updated: 05/15/21 1033     Respiratory Culture Light growth (2+) Normal Respiratory Mohini: NO S.aureus/MRSA or Pseudomonas aeruginosa     Gram Stain Moderate (3+) WBCs per low power field      Few (2+) Gram positive cocci in clusters      Rare (1+) Gram positive cocci in pairs    Legionella Antigen, Urine - Urine, Urine, Clean Catch [[id number]]  (Normal) Collected: 05/12/21 1707    Lab Status: Final result Specimen: Urine, Clean Catch Updated: 05/12/21 1743     LEGIONELLA ANTIGEN, URINE Negative    S. Pneumo Ag Urine or CSF - Urine, Urine, Clean Catch [[id number]]  (Normal) Collected: 05/12/21 1707    Lab Status: Final result Specimen: Urine, Clean Catch Updated: 05/12/21 1743     Strep Pneumo Ag Negative    Respiratory Panel, PCR (WITHOUT COVID) - Swab, Nasopharynx [[id number]]  (Normal) Collected: 05/12/21 1436    Lab Status: Final result Specimen: Swab from Nasopharynx Updated: 05/12/21 1542     ADENOVIRUS, PCR Not Detected     Coronavirus 229E Not Detected     Coronavirus HKU1 Not Detected     Coronavirus NL63 Not  Detected     Coronavirus OC43 Not Detected     Human Metapneumovirus Not Detected     Human Rhinovirus/Enterovirus Not Detected     Influenza B PCR Not Detected     Parainfluenza Virus 1 Not Detected     Parainfluenza Virus 2 Not Detected     Parainfluenza Virus 3 Not Detected     Parainfluenza Virus 4 Not Detected     Bordetella pertussis pcr Not Detected     Chlamydophila pneumoniae PCR Not Detected     Mycoplasma pneumo by PCR Not Detected     Influenza A PCR Not Detected     RSV, PCR Not Detected     Bordetella parapertussis PCR Not Detected    Narrative:      The coronavirus on the RVP is NOT COVID-19 and is NOT indicative of infection with COVID-19.     Blood Culture - Blood, Arm, Right [[id number]] Collected: 05/10/21 1523    Lab Status: Preliminary result Specimen: Blood from Arm, Right Updated: 05/14/21 1530     Blood Culture No growth at 4 days    COVID-19, [facility]OTT IN-HOUSE,NASAL Swab (NO TRANSPORT MEDIA) 2 HR TAT - Swab, Nasopharynx [[id number]]  (Normal) Collected: 05/10/21 1503    Lab Status: Final result Specimen: Swab from Nasopharynx Updated: 05/10/21 1544     COVID19 Presumptive Negative    Narrative:      Fact sheet for providers: https://www.fda.gov/media/876298/download     Fact sheet for patients: https://www.fda.gov/media/601678/download    Test performed by PCR.  If inconsistent with clinical signs and symptoms patient should be tested with different authorized molecular test.    Blood Culture - Blood, Arm, Right [[id number]] Collected: 05/10/21 1348    Lab Status: Preliminary result Specimen: Blood from Arm, Right Updated: 05/14/21 1400     Blood Culture No growth at 4 days          ECG/EMG Results (most recent)     Procedure Component Value Units Date/Time    ECG 12 Lead [[id number]] Collected: 05/10/21 1133     Updated: 05/12/21 0802     QT Interval 383 ms     Narrative:      HEART RATE= 84  bpm  RR Interval= 716  ms  MD Interval= 155  ms  P Horizontal Axis= -14  deg  P Front Axis= 63  deg  QRSD  Interval= 83  ms  QT Interval= 383  ms  QRS Axis= 56  deg  T Wave Axis= 121  deg  - ABNORMAL ECG -  Sinus rhythm  multiple pvc  When compared with ECG of 25-Mar-2021 13:44:48,  No significant change  Electronically Signed By: Andrew Vann (Pablo) 12-May-2021 08:01:35  Date and Time of Study: 2021-05-10 11:33:34    Adult Transthoracic Echo Complete W/ Cont if Necessary Per Protocol [[id number]] Collected: 05/13/21 1347     Updated: 05/13/21 1638     BSA 1.9 m^2      RVIDd 2.6 cm      IVSd 1.0 cm      IVSs 1.8 cm      LVIDd 5.2 cm      LVIDs 2.5 cm      LVPWd 0.98 cm      BH CV ECHO ARIELLE - LVPWS 1.7 cm      IVS/LVPW 1.1     FS 52.0 %      EDV(Teich) 128.1 ml      ESV(Teich) 22.0 ml      EF(Teich) 82.8 %      EDV(cubed) 138.6 ml      ESV(cubed) 15.3 ml      EF(cubed) 88.9 %      % IVS thick 75.9 %      % LVPW thick 71.6 %      LV mass(C)d 194.6 grams      LV mass(C)dI 102.5 grams/m^2      LV mass(C)s 166.4 grams      LV mass(C)sI 87.7 grams/m^2      SV(Teich) 106.1 ml      SI(Teich) 55.9 ml/m^2      SV(cubed) 123.3 ml      SI(cubed) 64.9 ml/m^2      Ao root diam 2.9 cm      Ao root area 6.8 cm^2      ACS 1.6 cm      LVOT diam 1.9 cm      LVOT area 2.8 cm^2      EDV(MOD-sp4) 62.0 ml      ESV(MOD-sp4) 16.2 ml      EF(MOD-sp4) 74.0 %      SV(MOD-sp4) 45.9 ml      SI(MOD-sp4) 24.2 ml/m^2      Ao root area (BSA corrected) 1.6     LV Willams Vol (BSA corrected) 32.7 ml/m^2      LV Sys Vol (BSA corrected) 8.5 ml/m^2      MV E max gretel 161.5 cm/sec      MV A max gretel 161.0 cm/sec      MV E/A 1.0     MV V2 max 192.1 cm/sec      MV max PG 14.8 mmHg      MV V2 mean 127.9 cm/sec      MV mean PG 7.2 mmHg      MV V2 VTI 49.1 cm      MVA(VTI) 1.8 cm^2      MV dec slope 660.6 cm/sec^2      MV dec time 0.24 sec      Ao pk gretel 274.7 cm/sec      Ao max PG 30.9 mmHg      Ao max PG (full) 20.0 mmHg      Ao V2 mean 190.2 cm/sec      Ao mean PG 16.8 mmHg      Ao mean PG (full) 11.3 mmHg      Ao V2 VTI 52.6 cm      HARLEY(I,A) 1.7 cm^2      HARLEY(I,D)  1.7 cm^2      HARLEY(V,A) 1.7 cm^2      HARLEY(V,D) 1.7 cm^2      LV V1 max PG 10.9 mmHg      LV V1 mean PG 5.4 mmHg      LV V1 max 165.1 cm/sec      LV V1 mean 107.3 cm/sec      LV V1 VTI 31.6 cm      SV(Ao) 359.7 ml      SI(Ao) 189.5 ml/m^2      SV(LVOT) 89.6 ml      SI(LVOT) 47.2 ml/m^2      PA V2 max 173.1 cm/sec      PA max PG 12.0 mmHg      PA max PG (full) 5.2 mmHg      PA V2 mean 124.0 cm/sec      PA mean PG 6.7 mmHg      PA mean PG (full) 3.2 mmHg      PA V2 VTI 33.9 cm      PA acc time 0.09 sec      RV V1 max PG 6.7 mmHg      RV V1 mean PG 3.5 mmHg      RV V1 max 129.8 cm/sec      RV V1 mean 87.4 cm/sec      RV V1 VTI 25.7 cm      TR max luca 366.4 cm/sec      RVSP(TR) 56.8 mmHg      RAP systole 3.0 mmHg      PA pr(Accel) 38.5 mmHg      Pulm Sys Luca 56.2 cm/sec      Pulm Willams Luca 73.0 cm/sec      Pulm S/D 0.77     Pulm A Revs Dur 0.08 sec      Pulm A Revs Luca 34.5 cm/sec       CV ECHO ARIELLE - BZI_BMI 40.6 kilograms/m^2       CV ECHO ARIELLE - BSA(HAYCOCK) 2.1 m^2       CV ECHO ARIELLE - BZI_METRIC_WEIGHT 94.3 kg       CV ECHO ARIELLE - BZI_METRIC_HEIGHT 152.4 cm      Target HR (85%) 116 bpm      Max. Pred. HR (100%) 136 bpm      EF(MOD-bp) 74.0 %      LA dimension(2D) 4.7 cm      Echo EF Estimated 75 %     Narrative:      · Estimated left ventricular EF = 75% Estimated left ventricular EF was in   agreement with the calculated left ventricular EF. Left ventricular   systolic function is hyperdynamic (EF > 70%).  · Mild aortic valve stenosis is present.  · Moderate tricuspid valve regurgitation is present.  · Estimated right ventricular systolic pressure from tricuspid   regurgitation is moderately elevated (45-55 mmHg).  · Moderate pulmonary hypertension is present.  · Left ventricular diastolic dysfunction is noted.             Results for orders placed during the hospital encounter of 04/26/21    Venous w Reflux Lower Extremity - Bilateral CAR    Interpretation Summary  · There is evidence of mild reflux in the  right mid femoral vein. There is evidence of severe reflux in the right greater saphenous vein at mid thigh. There is evidence of mild reflux in the right greater saphenous vein at the knee.  · Remaining veins on the right without reflux. The left lower extremity normal without reflux.      Results for orders placed during the hospital encounter of 05/10/21    Adult Transthoracic Echo Complete W/ Cont if Necessary Per Protocol    Interpretation Summary  · Estimated left ventricular EF = 75% Estimated left ventricular EF was in agreement with the calculated left ventricular EF. Left ventricular systolic function is hyperdynamic (EF > 70%).  · Mild aortic valve stenosis is present.  · Moderate tricuspid valve regurgitation is present.  · Estimated right ventricular systolic pressure from tricuspid regurgitation is moderately elevated (45-55 mmHg).  · Moderate pulmonary hypertension is present.  · Left ventricular diastolic dysfunction is noted.      CT Chest Without Contrast Diagnostic    Result Date: 5/12/2021   1. FINDINGS compatible with the appearance of multifocal pneumonia, greatest in the bilateral lower lobes. 2. Chronic areas of scarring or fibrosis in both lungs. 3. Suspected mild background interstitial edema. Small layering bilateral pleural effusions, right greater than left. 4. Cirrhotic liver morphology. Correlate with known history. 5.  Splenomegaly, increased since 2018. 6. Multiple chronic thoracic and lumbar compression fractures with posterior spinal fusion in the thoracolumbar junction.    Electronically Signed By-Nitza Ferreira MD On:5/12/2021 9:51 AM This report was finalized on [id number] by  Nitza Ferreira MD.    XR Chest 1 View    Result Date: 5/11/2021  1. Stable right mid-lower lung opacity which may be due to pneumonia. 2. Slight increase in left basal opacity is consistent with mild atelectasis or pneumonia superimposed on chronic change. 3. Cardiomegaly.  Electronically Signed  By-Val Dickerson MD On:5/11/2021 9:33 PM This report was finalized on [id number] by  Val Dickerson MD.    XR Chest 1 View    Result Date: 5/10/2021   1. New right basilar opacity may represent developing pneumonia. 2. Chronic scarring in the left perihilar region and left lower lobe. 3. Stable cardiomegaly.  Electronically Signed By-Nitza Ferreira MD On:5/10/2021 12:18 PM This report was finalized on [id number] by  Nitza Ferreira MD.          Xrays, labs reviewed personally by physician.    Medication Review:   I have reviewed the patient's current medication list      Scheduled Meds  allopurinol, 100 mg, Oral, Daily  ARIPiprazole, 2 mg, Oral, Nightly  baclofen, 5 mg, Oral, BID  budesonide, 0.5 mg, Nebulization, BID - RT  carbidopa-levodopa, 2 tablet, Oral, TID  cefTAZidime, 2 g, Intravenous, Q8H  cetirizine, 5 mg, Oral, Daily  Diclofenac Sodium, 4 g, Topical, BID  doxycycline, 100 mg, Oral, Q12H  DULoxetine, 60 mg, Oral, Daily  fenofibrate, 145 mg, Oral, Daily  ipratropium-albuterol, 3 mL, Nebulization, 4x Daily - RT  levothyroxine, 137 mcg, Oral, Daily  lubiprostone, 24 mcg, Oral, BID With Meals  magnesium oxide, 400 mg, Oral, Daily  melatonin, 5 mg, Oral, Nightly  pantoprazole, 40 mg, Oral, QAM  pregabalin, 50 mg, Oral, Nightly  rOPINIRole, 3 mg, Oral, TID  senna, 1 tablet, Oral, Nightly  senna-docusate sodium, 2 tablet, Oral, BID  sodium chloride, 10 mL, Intravenous, Q12H  sucralfate, 1 g, Oral, 4x Daily AC & at Bedtime        Meds Infusions  sodium chloride, 100 mL/hr, Last Rate: Stopped (05/11/21 2130)  sodium chloride, 9 mL/hr        Meds PRN  senna-docusate sodium **AND** polyethylene glycol **AND** bisacodyl **AND** bisacodyl  •  HYDROcodone-acetaminophen  •  ipratropium-albuterol  •  ondansetron **OR** ondansetron  •  simethicone  •  [COMPLETED] Insert peripheral IV **AND** sodium chloride  •  sodium chloride  •  sodium chloride        Assessment/Plan   Assessment/Plan     Active Hospital Problems     Diagnosis  POA   • Right lower lobe pneumonia [J18.9]  Yes     Priority: High  Continue doxycycline.  Follow ID recommendations.     • Acute blood loss anemia [D62]  Yes     Priority: High  Monitor H&H.       • Gastrointestinal hemorrhage with melena [K92.1]  Yes     Priority: High  Follow GI recommendations.     • Hyperlipidemia [E78.5]  Yes     Priority: High  Treat with Lipitor.     • Hypothyroidism [E03.9]  Yes     Priority: Low  Treat with Synthroid.     • Severe anemia [D64.9]  Yes      Resolved Hospital Problems   No resolved problems to display.       MEDICAL DECISION MAKING COMPLEXITY BY PROBLEM:   Resume appropriate patient's home medications for all other chronic medical conditions.  Continue the present level of care.  Patient and family agreed with the plan of care.      VTE Prophylaxis -   Mechanical Order History:      Ordered        05/10/21 1809  Place Sequential Compression Device  Once         05/10/21 1809  Maintain Sequential Compression Device  Continuous                 Pharmalogical Order History:     None            Code Status -   Code Status and Medical Interventions:   Ordered at: 05/10/21 1401     Level Of Support Discussed With:    Patient     Code Status:    CPR     Medical Interventions (Level of Support Prior to Arrest):    Full       This patient has been examined wearing appropriate Personal Protective Equipment and discussed with hospital infection control department, [facility], infectious disease specialist and pulmonologist. 05/15/21        Discharge Planning  Pending patient's clinical improvement.  Patient will benefit from a rehab placement.        Electronically signed by Larry Junior MD, 05/15/21, 12:51 EDT.  [facility] Hospitalist Team

## 2021-05-15 NOTE — PROGRESS NOTES
"PULMONARY CRITICAL CARE Progress  NOTE      PATIENT IDENTIFICATION:  Name: Lorenza Owens  MRN: [id number]  :  1935     Age: 86 y.o.  Sex: female    DATE OF Note:  5/15/2021   Referring Physician: Larry Junior MD                  Subjective:   Feeling better, no new issue, no SOB no chest pain, no nausea or vomiting, no change in bowel habit, no dysuria,  no new  skin rash or itching.      Objective:  tMax 24 hrs: Temp (24hrs), Av.9 °F (36.6 °C), Min:97.3 °F (36.3 °C), Max:98.5 °F (36.9 °C)      Vitals Ranges:   Temp:  [97.3 °F (36.3 °C)-98.5 °F (36.9 °C)] 97.8 °F (36.6 °C)  Heart Rate:  [77-90] 90  Resp:  [16-20] 18  BP: (121-138)/(53-60) 138/60    Intake and Output Last 3 Shifts:   I/O last 3 completed shifts:  In: 360 [P.O.:360]  Out: 2950 [Urine:2950]    Exam:  /60 (BP Location: Right arm, Patient Position: Lying)   Pulse 90   Temp 97.8 °F (36.6 °C) (Oral)   Resp 18   Ht 152.4 cm (60\")   Wt 96.1 kg (211 lb 13.8 oz) Comment: bed needs zeroed. pt unable to get up at this time   SpO2 100%   BMI 41.38 kg/m²     General Appearance: Alert    HEENT:  Normocephalic, without obvious abnormality, Conjunctiva/corneas clear,.  Normal external ear canals, Nares normal, no drainage     Neck:  Supple, symmetrical, trachea midline. No JVD.  Lungs /Chest wall:   Bilateral basal rhonchi, respirations unlabored symmetrical wall movement.     Heart:  Regular rate and rhythm, systolic murmur PMI left sternal border  Abdomen: Soft, non-tender, no masses, no organomegaly.    Extremities: Trace edema no clubbing or Cyanosis        Medications:    Current Facility-Administered Medications:   •  allopurinol (ZYLOPRIM) tablet 100 mg, 100 mg, Oral, Daily, Lori Light MD, 100 mg at 05/15/21 0920  •  ARIPiprazole (ABILIFY) tablet 2 mg, 2 mg, Oral, Nightly, Lori Light MD, 2 mg at 21  •  baclofen (LIORESAL) tablet 5 mg, 5 mg, Oral, BID, Lori Light MD, 5 mg at 05/15/21 0919  •  " sennosides-docusate (PERICOLACE) 8.6-50 MG per tablet 2 tablet, 2 tablet, Oral, BID, 2 tablet at 05/15/21 0919 **AND** polyethylene glycol (MIRALAX) packet 17 g, 17 g, Oral, Daily PRN **AND** bisacodyl (DULCOLAX) EC tablet 5 mg, 5 mg, Oral, Daily PRN **AND** bisacodyl (DULCOLAX) suppository 10 mg, 10 mg, Rectal, Daily PRN, Lori Light MD  •  budesonide (PULMICORT) nebulizer solution 0.5 mg, 0.5 mg, Nebulization, BID - RT, Svetlana Mckeon APRN, 0.5 mg at 05/15/21 0637  •  carbidopa-levodopa (SINEMET)  MG per tablet 2 tablet, 2 tablet, Oral, TID, Lori Light MD, 2 tablet at 05/15/21 0920  •  cefTAZidime (FORTAZ) 2 g in sodium chloride 0.9 % 100 mL IVPB, 2 g, Intravenous, Q8H, Sean Lucio MD, Last Rate: 200 mL/hr at 05/15/21 0500, 2 g at 05/15/21 0500  •  cetirizine (zyrTEC) tablet 5 mg, 5 mg, Oral, Daily, Lori Light MD, 5 mg at 05/15/21 0919  •  Diclofenac Sodium (VOLTAREN) 1 % gel 4 g, 4 g, Topical, BID, Lori Light MD, 4 g at 05/15/21 0921  •  doxycycline (ADOXA) tablet 100 mg, 100 mg, Oral, Q12H, Lori Light MD, 100 mg at 05/15/21 0920  •  DULoxetine (CYMBALTA) DR capsule 60 mg, 60 mg, Oral, Daily, Lori Light MD, 60 mg at 05/15/21 0919  •  fenofibrate (TRICOR) tablet 145 mg, 145 mg, Oral, Daily, Lori Light MD, 145 mg at 05/15/21 0920  •  HYDROcodone-acetaminophen (NORCO) 7.5-325 MG per tablet 1 tablet, 1 tablet, Oral, Q6H PRN, Larry Junior MD, 1 tablet at 05/15/21 0459  •  ipratropium-albuterol (DUO-NEB) nebulizer solution 3 mL, 3 mL, Nebulization, Q4H PRN, Bradley Atkins MD  •  ipratropium-albuterol (DUO-NEB) nebulizer solution 3 mL, 3 mL, Nebulization, 4x Daily - RT, Svetlana Mckeon APRN, 3 mL at 05/15/21 1044  •  levothyroxine (SYNTHROID, LEVOTHROID) tablet 137 mcg, 137 mcg, Oral, Daily, Lori Light MD, 137 mcg at 05/15/21 0920  •  lubiprostone (AMITIZA) capsule 24 mcg, 24 mcg, Oral, BID With Meals, Lori Light MD, 24 mcg at 05/15/21  0920  •  magnesium oxide (MAG-OX) tablet 400 mg, 400 mg, Oral, Daily, Lori Light MD, 400 mg at 05/15/21 0920  •  melatonin tablet 5 mg, 5 mg, Oral, Nightly, Lori Light MD, 5 mg at 05/14/21 2112  •  ondansetron (ZOFRAN) tablet 4 mg, 4 mg, Oral, Q6H PRN **OR** ondansetron (ZOFRAN) injection 4 mg, 4 mg, Intravenous, Q6H PRN, Lori Light MD  •  pantoprazole (PROTONIX) EC tablet 40 mg, 40 mg, Oral, QAM, Lori Light MD, 40 mg at 05/15/21 0919  •  pregabalin (LYRICA) capsule 50 mg, 50 mg, Oral, Nightly, Lori Light MD, 50 mg at 05/14/21 2121  •  rOPINIRole (REQUIP) tablet 3 mg, 3 mg, Oral, TID, Lori Light MD, 3 mg at 05/15/21 0920  •  senna (SENOKOT) tablet 1 tablet, 1 tablet, Oral, Nightly, Lori Light MD, 1 tablet at 05/13/21 2138  •  simethicone (MYLICON) chewable tablet 80 mg, 80 mg, Oral, Q6H PRN, Lori Light MD  •  [COMPLETED] Insert peripheral IV, , , Once **AND** sodium chloride 0.9 % flush 10 mL, 10 mL, Intravenous, PRN, Lori Light MD  •  sodium chloride 0.9 % flush 10 mL, 10 mL, Intravenous, Q12H, Lori Light MD, 10 mL at 05/15/21 0921  •  sodium chloride 0.9 % flush 10 mL, 10 mL, Intravenous, PRN, Lori Light MD  •  sodium chloride 0.9 % infusion, 100 mL/hr, Intravenous, Continuous, Lori Light MD, Stopped at 05/11/21 2130  •  sodium chloride 0.9 % infusion, 9 mL/hr, Intravenous, Continuous PRN, Lori Light MD  •  sucralfate (CARAFATE) tablet 1 g, 1 g, Oral, 4x Daily AC & at Bedtime, Lori Light MD, 1 g at 05/15/21 0920    Data Review:  All labs (24hrs): No results found for this or any previous visit (from the past 24 hour(s)).     Imaging:  Adult Transthoracic Echo Complete W/ Cont if Necessary Per Protocol  · Estimated left ventricular EF = 75% Estimated left ventricular EF was in   agreement with the calculated left ventricular EF. Left ventricular   systolic function is hyperdynamic (EF > 70%).  · Mild aortic valve  stenosis is present.  · Moderate tricuspid valve regurgitation is present.  · Estimated right ventricular systolic pressure from tricuspid   regurgitation is moderately elevated (45-55 mmHg).  · Moderate pulmonary hypertension is present.  · Left ventricular diastolic dysfunction is noted.          ASSESSMENT:  Acute hypoxic respiratory failure  Multifocal pneumonia  CLAUDIA  Right pleural effusion  GI Bleed   Anemia  GERD  Fibromyalgia  Hyperlipidemia  Hypothyroidism  PVD  Polyarthritis       PLAN:  Needs rehab at discharge   Antibiotics Doxy/ceftazidime 9 more days per ID   Bronchodilator  Inhaled corticosteroids  O2 support as needed   Incentive spirometer  Monitor H & H, transfuse less that 7.0  Electrolytes/ glycemic control  DVT and GI prophylaxis.    Discussed with Dr Bernardino Mckeon, APRN   5/15/2021  11:36 EDT     I personally have examined  and interviewed the patient. I have reviewed the history, data, problems, assessment and plan with our NP.  Critical care time in direct medical management (   ) minutes  Electronically signed by Beth Lebron MD, D,ABSM, 05/15/21, 12:14 PM EDT.

## 2021-05-16 PROBLEM — J96.21 ACUTE ON CHRONIC RESPIRATORY FAILURE WITH HYPOXIA: Status: ACTIVE | Noted: 2021-05-16

## 2021-05-16 LAB
ANION GAP SERPL CALCULATED.3IONS-SCNC: 6 MMOL/L (ref 5–15)
BASOPHILS # BLD AUTO: 0 10*3/MM3 (ref 0–0.2)
BASOPHILS NFR BLD AUTO: 0.4 % (ref 0–1.5)
BUN SERPL-MCNC: 18 MG/DL (ref 8–23)
BUN/CREAT SERPL: 24.7 (ref 7–25)
CALCIUM SPEC-SCNC: 9.1 MG/DL (ref 8.6–10.5)
CHLORIDE SERPL-SCNC: 105 MMOL/L (ref 98–107)
CO2 SERPL-SCNC: 30 MMOL/L (ref 22–29)
CREAT SERPL-MCNC: 0.73 MG/DL (ref 0.57–1)
DEPRECATED RDW RBC AUTO: 57.3 FL (ref 37–54)
EOSINOPHIL # BLD AUTO: 0.1 10*3/MM3 (ref 0–0.4)
EOSINOPHIL NFR BLD AUTO: 3 % (ref 0.3–6.2)
ERYTHROCYTE [DISTWIDTH] IN BLOOD BY AUTOMATED COUNT: 18.9 % (ref 12.3–15.4)
GFR SERPL CREATININE-BSD FRML MDRD: 76 ML/MIN/1.73
GLUCOSE SERPL-MCNC: 109 MG/DL (ref 65–99)
HCT VFR BLD AUTO: 23.6 % (ref 34–46.6)
HGB BLD-MCNC: 7.5 G/DL (ref 12–15.9)
LYMPHOCYTES # BLD AUTO: 0.7 10*3/MM3 (ref 0.7–3.1)
LYMPHOCYTES NFR BLD AUTO: 15.2 % (ref 19.6–45.3)
MCH RBC QN AUTO: 27.4 PG (ref 26.6–33)
MCHC RBC AUTO-ENTMCNC: 32 G/DL (ref 31.5–35.7)
MCV RBC AUTO: 85.5 FL (ref 79–97)
MONOCYTES # BLD AUTO: 0.4 10*3/MM3 (ref 0.1–0.9)
MONOCYTES NFR BLD AUTO: 9 % (ref 5–12)
NEUTROPHILS NFR BLD AUTO: 3.3 10*3/MM3 (ref 1.7–7)
NEUTROPHILS NFR BLD AUTO: 72.4 % (ref 42.7–76)
NRBC BLD AUTO-RTO: 0.1 /100 WBC (ref 0–0.2)
PLATELET # BLD AUTO: 166 10*3/MM3 (ref 140–450)
PMV BLD AUTO: 7 FL (ref 6–12)
POTASSIUM SERPL-SCNC: 4.2 MMOL/L (ref 3.5–5.2)
RBC # BLD AUTO: 2.76 10*6/MM3 (ref 3.77–5.28)
SODIUM SERPL-SCNC: 141 MMOL/L (ref 136–145)
WBC # BLD AUTO: 4.5 10*3/MM3 (ref 3.4–10.8)

## 2021-05-16 PROCEDURE — 05HY33Z INSERTION OF INFUSION DEVICE INTO UPPER VEIN, PERCUTANEOUS APPROACH: ICD-10-PCS | Performed by: INTERNAL MEDICINE

## 2021-05-16 PROCEDURE — 97112 NEUROMUSCULAR REEDUCATION: CPT

## 2021-05-16 PROCEDURE — 80048 BASIC METABOLIC PNL TOTAL CA: CPT | Performed by: NURSE PRACTITIONER

## 2021-05-16 PROCEDURE — 36410 VNPNXR 3YR/> PHY/QHP DX/THER: CPT

## 2021-05-16 PROCEDURE — 85025 COMPLETE CBC W/AUTO DIFF WBC: CPT | Performed by: NURSE PRACTITIONER

## 2021-05-16 PROCEDURE — C1751 CATH, INF, PER/CENT/MIDLINE: HCPCS

## 2021-05-16 PROCEDURE — 94799 UNLISTED PULMONARY SVC/PX: CPT

## 2021-05-16 PROCEDURE — 25010000002 CEFTAZIDIME PER 500 MG: Performed by: INTERNAL MEDICINE

## 2021-05-16 PROCEDURE — 97110 THERAPEUTIC EXERCISES: CPT

## 2021-05-16 PROCEDURE — 99233 SBSQ HOSP IP/OBS HIGH 50: CPT | Performed by: INTERNAL MEDICINE

## 2021-05-16 RX ORDER — ALBUTEROL SULFATE 90 UG/1
2 AEROSOL, METERED RESPIRATORY (INHALATION) EVERY 4 HOURS PRN
Qty: 18 G | Refills: 1 | Status: SHIPPED | OUTPATIENT
Start: 2021-05-16 | End: 2021-06-15

## 2021-05-16 RX ORDER — IPRATROPIUM BROMIDE 17 UG/1
2 AEROSOL, METERED RESPIRATORY (INHALATION) EVERY 4 HOURS PRN
Qty: 12.9 G | Refills: 11 | Status: SHIPPED | OUTPATIENT
Start: 2021-05-16 | End: 2021-07-27

## 2021-05-16 RX ORDER — SODIUM CHLORIDE 0.9 % (FLUSH) 0.9 %
10 SYRINGE (ML) INJECTION EVERY 12 HOURS SCHEDULED
Status: DISCONTINUED | OUTPATIENT
Start: 2021-05-16 | End: 2021-05-17 | Stop reason: HOSPADM

## 2021-05-16 RX ORDER — DOXYCYCLINE 100 MG/1
100 TABLET ORAL EVERY 12 HOURS SCHEDULED
Qty: 8 TABLET | Refills: 0 | Status: SHIPPED | OUTPATIENT
Start: 2021-05-16 | End: 2021-05-20

## 2021-05-16 RX ORDER — SODIUM CHLORIDE 0.9 % (FLUSH) 0.9 %
10 SYRINGE (ML) INJECTION AS NEEDED
Status: DISCONTINUED | OUTPATIENT
Start: 2021-05-16 | End: 2021-05-17 | Stop reason: HOSPADM

## 2021-05-16 RX ADMIN — DULOXETINE HYDROCHLORIDE 60 MG: 30 CAPSULE, DELAYED RELEASE ORAL at 09:18

## 2021-05-16 RX ADMIN — DICLOFENAC SODIUM 4 G: 10 GEL TOPICAL at 09:20

## 2021-05-16 RX ADMIN — FENOFIBRATE 145 MG: 145 TABLET ORAL at 09:19

## 2021-05-16 RX ADMIN — Medication 10 ML: at 22:20

## 2021-05-16 RX ADMIN — Medication 5 MG: at 22:18

## 2021-05-16 RX ADMIN — SUCRALFATE 1 G: 1 TABLET ORAL at 09:19

## 2021-05-16 RX ADMIN — BUDESONIDE 0.5 MG: 0.5 SUSPENSION RESPIRATORY (INHALATION) at 18:29

## 2021-05-16 RX ADMIN — IPRATROPIUM BROMIDE AND ALBUTEROL SULFATE 3 ML: 2.5; .5 SOLUTION RESPIRATORY (INHALATION) at 18:30

## 2021-05-16 RX ADMIN — CEFTAZIDIME 2 G: 2 INJECTION, POWDER, FOR SOLUTION INTRAVENOUS at 13:48

## 2021-05-16 RX ADMIN — ALLOPURINOL 100 MG: 100 TABLET ORAL at 09:19

## 2021-05-16 RX ADMIN — LUBIPROSTONE 24 MCG: 24 CAPSULE, GELATIN COATED ORAL at 17:22

## 2021-05-16 RX ADMIN — SUCRALFATE 1 G: 1 TABLET ORAL at 16:14

## 2021-05-16 RX ADMIN — ROPINIROLE HYDROCHLORIDE 3 MG: 1 TABLET, FILM COATED ORAL at 09:18

## 2021-05-16 RX ADMIN — BUDESONIDE 0.5 MG: 0.5 SUSPENSION RESPIRATORY (INHALATION) at 06:30

## 2021-05-16 RX ADMIN — IPRATROPIUM BROMIDE AND ALBUTEROL SULFATE 3 ML: 2.5; .5 SOLUTION RESPIRATORY (INHALATION) at 14:30

## 2021-05-16 RX ADMIN — BACLOFEN 5 MG: 10 TABLET ORAL at 09:19

## 2021-05-16 RX ADMIN — CARBIDOPA AND LEVODOPA 2 TABLET: 25; 100 TABLET ORAL at 16:14

## 2021-05-16 RX ADMIN — CEFTAZIDIME 2 G: 2 INJECTION, POWDER, FOR SOLUTION INTRAVENOUS at 22:28

## 2021-05-16 RX ADMIN — BACLOFEN 5 MG: 10 TABLET ORAL at 22:18

## 2021-05-16 RX ADMIN — LEVOTHYROXINE SODIUM 137 MCG: 0.03 TABLET ORAL at 09:20

## 2021-05-16 RX ADMIN — CARBIDOPA AND LEVODOPA 2 TABLET: 25; 100 TABLET ORAL at 09:19

## 2021-05-16 RX ADMIN — SUCRALFATE 1 G: 1 TABLET ORAL at 22:19

## 2021-05-16 RX ADMIN — CETIRIZINE HYDROCHLORIDE 5 MG: 10 TABLET, FILM COATED ORAL at 09:19

## 2021-05-16 RX ADMIN — ROPINIROLE HYDROCHLORIDE 3 MG: 1 TABLET, FILM COATED ORAL at 22:17

## 2021-05-16 RX ADMIN — DOXYCYCLINE 100 MG: 100 TABLET, FILM COATED ORAL at 22:18

## 2021-05-16 RX ADMIN — ROPINIROLE HYDROCHLORIDE 3 MG: 1 TABLET, FILM COATED ORAL at 16:14

## 2021-05-16 RX ADMIN — ARIPIPRAZOLE 2 MG: 2 TABLET ORAL at 22:19

## 2021-05-16 RX ADMIN — IPRATROPIUM BROMIDE AND ALBUTEROL SULFATE 3 ML: 2.5; .5 SOLUTION RESPIRATORY (INHALATION) at 11:00

## 2021-05-16 RX ADMIN — DICLOFENAC SODIUM 4 G: 10 GEL TOPICAL at 22:17

## 2021-05-16 RX ADMIN — HYDROCODONE BITARTRATE AND ACETAMINOPHEN 1 TABLET: 7.5; 325 TABLET ORAL at 18:34

## 2021-05-16 RX ADMIN — SUCRALFATE 1 G: 1 TABLET ORAL at 11:46

## 2021-05-16 RX ADMIN — PANTOPRAZOLE SODIUM 40 MG: 40 TABLET, DELAYED RELEASE ORAL at 09:18

## 2021-05-16 RX ADMIN — PREGABALIN 50 MG: 25 CAPSULE ORAL at 22:24

## 2021-05-16 RX ADMIN — DOXYCYCLINE 100 MG: 100 TABLET, FILM COATED ORAL at 09:18

## 2021-05-16 RX ADMIN — IPRATROPIUM BROMIDE AND ALBUTEROL SULFATE 3 ML: 2.5; .5 SOLUTION RESPIRATORY (INHALATION) at 06:30

## 2021-05-16 RX ADMIN — CARBIDOPA AND LEVODOPA 2 TABLET: 25; 100 TABLET ORAL at 22:19

## 2021-05-16 RX ADMIN — DOCUSATE SODIUM 50 MG AND SENNOSIDES 8.6 MG 2 TABLET: 8.6; 5 TABLET, FILM COATED ORAL at 22:19

## 2021-05-16 RX ADMIN — SENNOSIDES 1 TABLET: 8.6 TABLET, FILM COATED ORAL at 22:19

## 2021-05-16 RX ADMIN — HYDROCODONE BITARTRATE AND ACETAMINOPHEN 1 TABLET: 7.5; 325 TABLET ORAL at 11:46

## 2021-05-16 RX ADMIN — LUBIPROSTONE 24 MCG: 24 CAPSULE, GELATIN COATED ORAL at 09:19

## 2021-05-16 RX ADMIN — MAGNESIUM GLUCONATE 500 MG ORAL TABLET 400 MG: 500 TABLET ORAL at 09:18

## 2021-05-16 RX ADMIN — Medication 10 ML: at 09:20

## 2021-05-16 RX ADMIN — CEFTAZIDIME 2 G: 2 INJECTION, POWDER, FOR SOLUTION INTRAVENOUS at 06:04

## 2021-05-16 RX ADMIN — DOCUSATE SODIUM 50 MG AND SENNOSIDES 8.6 MG 2 TABLET: 8.6; 5 TABLET, FILM COATED ORAL at 09:19

## 2021-05-16 NOTE — PLAN OF CARE
Goal Outcome Evaluation:   Pt is currently resting in bed with minimal complaints. Pt is waiting to discharge to [facility] pending antibiotic availability for the facility. Pt has been encouraged and assisted to turn to prevent skin breakdown.

## 2021-05-16 NOTE — PROGRESS NOTES
Infectious Diseases Progress Note      LOS: 6 days   Patient Care Team:  Shikha Mcmahon APRN as PCP - General (Family Medicine)    Chief Complaint: Shortness of breath, fatigue    Subjective       The patient has been afebrile for the last 24 hours.  The patient is on 3 L of oxygen by nasal cannula, hemodynamically stable, and is tolerating antimicrobial therapy.      Review of Systems:   Review of Systems   Constitutional: Positive for fatigue.   HENT: Negative.    Eyes: Negative.    Respiratory: Positive for cough and shortness of breath.    Cardiovascular: Positive for leg swelling.   Gastrointestinal: Negative.    Endocrine: Negative.    Genitourinary: Negative.    Musculoskeletal: Negative.    Skin: Positive for wound.   Neurological: Positive for weakness.   Psychiatric/Behavioral: Negative.    All other systems reviewed and are negative.       Objective     Vital Signs  Temp:  [97.2 °F (36.2 °C)-99.4 °F (37.4 °C)] 98.5 °F (36.9 °C)  Heart Rate:  [92-99] 93  Resp:  [18] 18  BP: (116-140)/(47-70) 140/57    Physical Exam:  Physical Exam  Vitals and nursing note reviewed.   Constitutional:       General: She is not in acute distress.     Appearance: Normal appearance. She is well-developed and normal weight. She is ill-appearing. She is not diaphoretic.   HENT:      Head: Normocephalic and atraumatic.   Eyes:      General: No scleral icterus.     Extraocular Movements: Extraocular movements intact.      Conjunctiva/sclera: Conjunctivae normal.      Pupils: Pupils are equal, round, and reactive to light.   Cardiovascular:      Rate and Rhythm: Normal rate and regular rhythm.      Heart sounds: Normal heart sounds, S1 normal and S2 normal. No murmur heard.     Pulmonary:      Effort: Pulmonary effort is normal. No respiratory distress.      Breath sounds: No stridor. Rales present. No wheezing.   Chest:      Chest wall: No tenderness.   Abdominal:      General: Bowel sounds are normal. There is no distension.       Palpations: Abdomen is soft. There is no mass.      Tenderness: There is no abdominal tenderness. There is no guarding.   Genitourinary:     Comments: External catheter  Musculoskeletal:         General: No swelling, tenderness or deformity. Normal range of motion.      Cervical back: Neck supple.   Skin:     General: Skin is warm and dry.      Coloration: Skin is pale.      Findings: No bruising, erythema or rash.      Comments: Chronic venous stasis changes to both lower legs with some shallow ulcerations   Neurological:      General: No focal deficit present.      Mental Status: She is alert and oriented to person, place, and time. Mental status is at baseline.      Cranial Nerves: No cranial nerve deficit.   Psychiatric:         Mood and Affect: Mood normal.          Results Review:    I have reviewed all clinical data, test, lab, and imaging results.     Radiology  No Radiology Exams Resulted Within Past 24 Hours    Cardiology    Laboratory    Results from last 7 days   Lab Units 05/16/21  0153 05/14/21  0235 05/13/21  0234 05/12/21  0238 05/11/21  0935 05/11/21  0220 05/10/21  1151   WBC 10*3/mm3 4.50 4.50 5.00 4.40  --  4.60 3.50   HEMOGLOBIN g/dL 7.5* 7.9* 7.6* 7.6* 6.7* 6.0* 4.4*   HEMATOCRIT % 23.6* 24.3* 23.4* 23.5* 21.0* 18.7* 13.6*   PLATELETS 10*3/mm3 166 194 194 189  --  188 189     Results from last 7 days   Lab Units 05/16/21  0213 05/14/21  0235 05/13/21  0234 05/12/21  0238 05/11/21  0220 05/10/21  1151   SODIUM mmol/L 141 143 141 141 140 142   POTASSIUM mmol/L 4.2 3.9 3.8 4.2 4.4 4.7   CHLORIDE mmol/L 105 107 108* 108* 107 109*   CO2 mmol/L 30.0* 29.0 25.0 25.0 24.0 25.0   BUN mg/dL 18 16 17 22 25* 28*   CREATININE mg/dL 0.73 0.75 0.76 1.00 1.06* 1.26*   GLUCOSE mg/dL 109* 133* 122* 100* 114* 110*   ALBUMIN g/dL  --   --  3.10*  --   --  3.40*   BILIRUBIN mg/dL  --   --  0.5  --   --  0.5   ALK PHOS U/L  --   --  53  --   --  57   AST (SGOT) U/L  --   --  18  --   --  12   ALT (SGPT) U/L  --    --  <5  --   --  <5   CALCIUM mg/dL 9.1 9.1 9.1 8.7 8.4* 8.7                 Microbiology   Microbiology Results (last 10 days)     Procedure Component Value - Date/Time    Respiratory Culture - Sputum, Cough [[id number]] Collected: 05/13/21 1134    Lab Status: Final result Specimen: Sputum from Cough Updated: 05/15/21 1033     Respiratory Culture Light growth (2+) Normal Respiratory Mohini: NO S.aureus/MRSA or Pseudomonas aeruginosa     Gram Stain Moderate (3+) WBCs per low power field      Few (2+) Gram positive cocci in clusters      Rare (1+) Gram positive cocci in pairs    Legionella Antigen, Urine - Urine, Urine, Clean Catch [[id number]]  (Normal) Collected: 05/12/21 1707    Lab Status: Final result Specimen: Urine, Clean Catch Updated: 05/12/21 1743     LEGIONELLA ANTIGEN, URINE Negative    S. Pneumo Ag Urine or CSF - Urine, Urine, Clean Catch [[id number]]  (Normal) Collected: 05/12/21 1707    Lab Status: Final result Specimen: Urine, Clean Catch Updated: 05/12/21 1743     Strep Pneumo Ag Negative    Respiratory Panel, PCR (WITHOUT COVID) - Swab, Nasopharynx [[id number]]  (Normal) Collected: 05/12/21 1436    Lab Status: Final result Specimen: Swab from Nasopharynx Updated: 05/12/21 1542     ADENOVIRUS, PCR Not Detected     Coronavirus 229E Not Detected     Coronavirus HKU1 Not Detected     Coronavirus NL63 Not Detected     Coronavirus OC43 Not Detected     Human Metapneumovirus Not Detected     Human Rhinovirus/Enterovirus Not Detected     Influenza B PCR Not Detected     Parainfluenza Virus 1 Not Detected     Parainfluenza Virus 2 Not Detected     Parainfluenza Virus 3 Not Detected     Parainfluenza Virus 4 Not Detected     Bordetella pertussis pcr Not Detected     Chlamydophila pneumoniae PCR Not Detected     Mycoplasma pneumo by PCR Not Detected     Influenza A PCR Not Detected     RSV, PCR Not Detected     Bordetella parapertussis PCR Not Detected    Narrative:      The coronavirus on the RVP is NOT COVID-19  and is NOT indicative of infection with COVID-19.     Blood Culture - Blood, Arm, Right [[id number]] Collected: 05/10/21 1523    Lab Status: Final result Specimen: Blood from Arm, Right Updated: 05/15/21 1530     Blood Culture No growth at 5 days    COVID-19, [facility] IN-HOUSE,NASAL Swab (NO TRANSPORT MEDIA) 2 HR TAT - Swab, Nasopharynx [[id number]]  (Normal) Collected: 05/10/21 1503    Lab Status: Final result Specimen: Swab from Nasopharynx Updated: 05/10/21 1544     COVID19 Presumptive Negative    Narrative:      Fact sheet for providers: https://www.fda.gov/media/601954/download     Fact sheet for patients: https://www.fda.gov/media/136383/download    Test performed by PCR.  If inconsistent with clinical signs and symptoms patient should be tested with different authorized molecular test.    Blood Culture - Blood, Arm, Right [[id number]] Collected: 05/10/21 1348    Lab Status: Final result Specimen: Blood from Arm, Right Updated: 05/15/21 1400     Blood Culture No growth at 5 days          Medication Review:       Schedule Meds  allopurinol, 100 mg, Oral, Daily  ARIPiprazole, 2 mg, Oral, Nightly  baclofen, 5 mg, Oral, BID  budesonide, 0.5 mg, Nebulization, BID - RT  carbidopa-levodopa, 2 tablet, Oral, TID  cefTAZidime, 2 g, Intravenous, Q8H  cetirizine, 5 mg, Oral, Daily  Diclofenac Sodium, 4 g, Topical, BID  doxycycline, 100 mg, Oral, Q12H  DULoxetine, 60 mg, Oral, Daily  fenofibrate, 145 mg, Oral, Daily  ipratropium-albuterol, 3 mL, Nebulization, 4x Daily - RT  levothyroxine, 137 mcg, Oral, Daily  lubiprostone, 24 mcg, Oral, BID With Meals  magnesium oxide, 400 mg, Oral, Daily  melatonin, 5 mg, Oral, Nightly  pantoprazole, 40 mg, Oral, QAM  pregabalin, 50 mg, Oral, Nightly  rOPINIRole, 3 mg, Oral, TID  senna, 1 tablet, Oral, Nightly  senna-docusate sodium, 2 tablet, Oral, BID  sodium chloride, 10 mL, Intravenous, Q12H  sodium chloride, 10 mL, Intravenous, Q12H  sucralfate, 1 g, Oral, 4x Daily AC & at  Bedtime        Infusion Meds  sodium chloride, 100 mL/hr, Last Rate: Stopped (05/11/21 2130)  sodium chloride, 9 mL/hr        PRN Meds  senna-docusate sodium **AND** polyethylene glycol **AND** bisacodyl **AND** bisacodyl  •  HYDROcodone-acetaminophen  •  ipratropium-albuterol  •  ondansetron **OR** ondansetron  •  simethicone  •  [COMPLETED] Insert peripheral IV **AND** sodium chloride  •  sodium chloride  •  sodium chloride  •  sodium chloride        Assessment/Plan       Antimicrobial Therapy   1.  Doxycycline      day  2.  Fortaz      day  3.      Day  4.      Day  5.      Day      Assessment     Multifocal pneumonia.  Patient is currently on 3 days of oxygen.  Patient is baseline at the nursing home was room air  COVID-19 screen was negative on admission  -Currently on 3 L of oxygen by nasal cannula  -Pneumococcal and Legionella urine antigen was negative, respiratory viral DNA panel was negative     Severe anemia-hemoglobin was 4.1 at admission and an EGD completed on 5/11/2021 showed AV malformations which were cauterized.  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  Patient has superficial venous stasis ulceration on the right leg with some greenish drainage.  Culture prior to admission grew Pseudomonas aeruginosa and MRSA     Peripheral vascular disease        Plan     Continue doxycycline 100 mg p.o. every 12 hours  Continue ceftazidime 2 g IV every 12 hours  Recommend 10 days of the above antimicrobial therapy  Patient will need a midline before discharge-please remove when IV antibiotics are completed  Incentive spirometer  Continue supportive care  Okay to discharge from Infectious Disease standpoint to rehab          Nadya Carpenter, APRN  05/16/21  15:53 EDT      Note is dictated utilizing voice recognition software/Dragon

## 2021-05-16 NOTE — DISCHARGE SUMMARY
[facility]  DISCHARGE SUMMARY        Prepared For PCP:  Shikha Mcmahon, DENISHA    Patient Name: Lorenza Owens  : 1935  MRN: [id number]      Date of Admission:   5/10/2021    Date of Discharge:  2021    Length of stay:  LOS: 7 days     Hospital Course     Presenting Problem:   Severe anemia [D64.9]  Pneumonia of right lower lobe due to infectious organism [J18.9]  Dyspnea, unspecified type [R06.00]      Active Hospital Problems    Diagnosis  POA   • Right lower lobe pneumonia [J18.9]  Yes     Priority: High     • Acute blood loss anemia [D62]  Yes     Priority: High     • Gastrointestinal hemorrhage with melena [K92.1]  Yes     Priority: High      Acute respiratory failure with hypoxia.  Treat with oxygen therapy.         • Hyperlipidemia [E78.5]  Yes     Priority: High     • Hypothyroidism [E03.9]  Yes     Priority: Low   • Non-pressure ulcer of lower extremity, right, with fat layer exposed (CMS/HCC) [L97.912]  Yes   • Non-pressure ulcer of left lower extremity with fat layer exposed (CMS/HCC) [L97.922]  Yes   • Venous ulcer (CMS/HCC) [I83.009, L97.909]  Yes   • Severe anemia [D64.9]  Yes   • Peripheral venous insufficiency [I87.2]  Yes      Resolved Hospital Problems   No resolved problems to display.           Hospital Course:  Lorenza Owens is a 86 y.o. female   Patient is an 86-year-old female with past medical history of GERD, fibromyalgia, polyosteoarthritis, bedbound for more than 10 years, using  wheelchair for transportation, hypothyroidism, anemia, depression, bilateral lower extremity edema, hemorrhoids, hyperlipidemia, chronic low back pain, wheelchair dependence, CLAUDIA, peripheral vascular insufficiency, bilateral lower extremity cellulitis who presented to the emergency room because of abnormal labs, hemoglobin 4.1 and shortness of breath.  Patient was seen in the emergency room and Hemoccult was negative.  Patient reported having dark tarry  stools 2 weeks prior to presentation to the emergency room.  Patient was seen in the emergency room and was diagnosed with GI bleed and a right lower lobe pneumonia.  Patient was recommended for admission for further treatment and management.  Patient was started on a transfusion of packed red blood cells in the emergency room prior to transfer to PCU.  Acute on chronic respiratory failure with hypoxia was treated with oxygen therapy.  Multifocal pneumonia was treated with antibiotics.  Acute blood sudden anemia was treated with transfusion of packed red blood cells.  GI bleeding was treated by endoscopy with cauterization of AVMs.     GI consult was completed.  Pulmonary and ID consults were completed.     Hypothyroidism was treated with Synthroid.  Hyperlipidemia was treated with Lipitor.  Debility was treated with physical therapy.  Appropriate patient's home medications were resumed in the hospital for all other chronic medical conditions.  Patient reported significant improvement in her symptoms after over 6 days in the hospital and requested to be discharged home.  Patient was advised to take her medications as prescribed.  Discharge medications are as per medication reconciliation list.  Patient was advised to follow-up with the primary care physician within 3 to 5 days of discharge.  Patient was advised to follow-up with the pulmonary within 14 days of discharge.  Patient was advised to follow-up with ID within 14 days of discharge.  Patient was advised to follow-up with GI within 14 days of discharge.  Patient was advised to return to the emergency department if she experiences any recurrence of her symptoms.  Patient and family agreed with the plan and patient was discharged in a stable condition.            Recommendation for Outpatient Providers:     Patient was advised to follow-up with her primary care physician who will review her current medications..  PCP is expected to reassess hemoglobin and  hematocrit.  Patient was advised to follow-up with ID who will review antibiotics and final cultures.  Patient was advised to follow-up with pulmonary who will review the resolution of the pneumonia and acute on chronic respiratory failure with hypoxia.  Patient was advised to follow-up with GI because of recent GI bleed and acute blood loss anemia.        Reasons For Change In Medications and Indications for New Medications:        Day of Discharge     HPI:   Patient is an 86-year-old female with past medical history of GERD, fibromyalgia, polyosteoarthritis, bedbound for more than 10 years, using  wheelchair for transportation, hypothyroidism, anemia, depression, bilateral lower extremity edema, hemorrhoids, hyperlipidemia, chronic low back pain, wheelchair dependence, CLAUDIA, peripheral vascular insufficiency, bilateral lower extremity cellulitis who presented to the emergency room because of abnormal labs, hemoglobin 4.1 and shortness of breath.  Patient was seen in the emergency room and Hemoccult was negative.  Patient reported having dark tarry stools 2 weeks prior to presentation to the emergency room.  Patient was seen in the emergency room and was diagnosed with GI bleed and a right lower lobe pneumonia.  Patient was recommended for admission for further treatment and management.  Patient was started on a transfusion of packed red blood cells in the emergency room prior to transfer to PCU.             Vital Signs:   Temp:  [97.2 °F (36.2 °C)-99.4 °F (37.4 °C)] 98.5 °F (36.9 °C)  Heart Rate:  [] 98  Resp:  [12-18] 18  BP: (116-140)/(47-70) 140/57     Physical Exam:  Physical Exam    Pertinent  and/or Most Recent Results     Results from last 7 days   Lab Units 05/16/21  0213 05/16/21  0153 05/14/21  0235 05/13/21  0234 05/12/21  0238 05/11/21  0935 05/11/21  0220 05/10/21  1151   WBC 10*3/mm3  --  4.50 4.50 5.00 4.40  --  4.60 3.50   HEMOGLOBIN g/dL  --  7.5* 7.9* 7.6* 7.6* 6.7* 6.0* 4.4*   HEMATOCRIT %   --  23.6* 24.3* 23.4* 23.5* 21.0* 18.7* 13.6*   PLATELETS 10*3/mm3  --  166 194 194 189  --  188 189   SODIUM mmol/L 141  --  143 141 141  --  140 142   POTASSIUM mmol/L 4.2  --  3.9 3.8 4.2  --  4.4 4.7   CHLORIDE mmol/L 105  --  107 108* 108*  --  107 109*   CO2 mmol/L 30.0*  --  29.0 25.0 25.0  --  24.0 25.0   BUN mg/dL 18  --  16 17 22  --  25* 28*   CREATININE mg/dL 0.73  --  0.75 0.76 1.00  --  1.06* 1.26*   GLUCOSE mg/dL 109*  --  133* 122* 100*  --  114* 110*   CALCIUM mg/dL 9.1  --  9.1 9.1 8.7  --  8.4* 8.7     Results from last 7 days   Lab Units 05/13/21  0234 05/10/21  1151   BILIRUBIN mg/dL 0.5 0.5   ALK PHOS U/L 53 57   ALT (SGPT) U/L <5 <5   AST (SGOT) U/L 18 12   PROTIME Seconds  --  13.2*   INR   --  1.21*   APTT seconds  --  25.1           Invalid input(s): TG, LDLCALC, LDLREALC  Results from last 7 days   Lab Units 05/10/21  1151   PROBNP pg/mL 715.0   TROPONIN T ng/mL <0.010       Brief Urine Lab Results  (Last result in the past 365 days)      Color   Clarity   Blood   Leuk Est   Nitrite   Protein   CREAT   Urine HCG        03/25/21 1043 Yellow Cloudy  Comment:  Result checked  Trace Large (3+) Negative Negative               Microbiology Results Abnormal     Procedure Component Value - Date/Time    Blood Culture - Blood, Arm, Right [[id number]] Collected: 05/10/21 1523    Lab Status: Final result Specimen: Blood from Arm, Right Updated: 05/15/21 1530     Blood Culture No growth at 5 days    Blood Culture - Blood, Arm, Right [[id number]] Collected: 05/10/21 1348    Lab Status: Final result Specimen: Blood from Arm, Right Updated: 05/15/21 1400     Blood Culture No growth at 5 days    Respiratory Culture - Sputum, Cough [[id number]] Collected: 05/13/21 1134    Lab Status: Final result Specimen: Sputum from Cough Updated: 05/15/21 1033     Respiratory Culture Light growth (2+) Normal Respiratory Mohini: NO S.aureus/MRSA or Pseudomonas aeruginosa     Gram Stain Moderate (3+) WBCs per low power field       Few (2+) Gram positive cocci in clusters      Rare (1+) Gram positive cocci in pairs    S. Pneumo Ag Urine or CSF - Urine, Urine, Clean Catch [[id number]]  (Normal) Collected: 05/12/21 1707    Lab Status: Final result Specimen: Urine, Clean Catch Updated: 05/12/21 1743     Strep Pneumo Ag Negative    Legionella Antigen, Urine - Urine, Urine, Clean Catch [[id number]]  (Normal) Collected: 05/12/21 1707    Lab Status: Final result Specimen: Urine, Clean Catch Updated: 05/12/21 1743     LEGIONELLA ANTIGEN, URINE Negative    Respiratory Panel, PCR (WITHOUT COVID) - Swab, Nasopharynx [[id number]]  (Normal) Collected: 05/12/21 1436    Lab Status: Final result Specimen: Swab from Nasopharynx Updated: 05/12/21 1542     ADENOVIRUS, PCR Not Detected     Coronavirus 229E Not Detected     Coronavirus HKU1 Not Detected     Coronavirus NL63 Not Detected     Coronavirus OC43 Not Detected     Human Metapneumovirus Not Detected     Human Rhinovirus/Enterovirus Not Detected     Influenza B PCR Not Detected     Parainfluenza Virus 1 Not Detected     Parainfluenza Virus 2 Not Detected     Parainfluenza Virus 3 Not Detected     Parainfluenza Virus 4 Not Detected     Bordetella pertussis pcr Not Detected     Chlamydophila pneumoniae PCR Not Detected     Mycoplasma pneumo by PCR Not Detected     Influenza A PCR Not Detected     RSV, PCR Not Detected     Bordetella parapertussis PCR Not Detected    Narrative:      The coronavirus on the RVP is NOT COVID-19 and is NOT indicative of infection with COVID-19.     COVID-19, ABBOTT IN-HOUSE,NASAL Swab (NO TRANSPORT MEDIA) 2 HR TAT - Swab, Nasopharynx [[id number]]  (Normal) Collected: 05/10/21 1503    Lab Status: Final result Specimen: Swab from Nasopharynx Updated: 05/10/21 1544     COVID19 Presumptive Negative    Narrative:      Fact sheet for providers: https://www.fda.gov/media/317884/download     Fact sheet for patients: https://www.fda.gov/media/881453/download    Test performed by PCR.  If  inconsistent with clinical signs and symptoms patient should be tested with different authorized molecular test.          CT Chest Without Contrast Diagnostic    Result Date: 5/12/2021  Impression:  1. FINDINGS compatible with the appearance of multifocal pneumonia, greatest in the bilateral lower lobes. 2. Chronic areas of scarring or fibrosis in both lungs. 3. Suspected mild background interstitial edema. Small layering bilateral pleural effusions, right greater than left. 4. Cirrhotic liver morphology. Correlate with known history. 5.  Splenomegaly, increased since 2018. 6. Multiple chronic thoracic and lumbar compression fractures with posterior spinal fusion in the thoracolumbar junction.    Electronically Signed By-Nitza Ferreira MD On:5/12/2021 9:51 AM This report was finalized on [id number] by  Nitza Ferreira MD.    XR Chest 1 View    Result Date: 5/11/2021  Impression: 1. Stable right mid-lower lung opacity which may be due to pneumonia. 2. Slight increase in left basal opacity is consistent with mild atelectasis or pneumonia superimposed on chronic change. 3. Cardiomegaly.  Electronically Signed By-Val Dickerson MD On:5/11/2021 9:33 PM This report was finalized on [id number] by  Val Dickerson MD.    XR Chest 1 View    Result Date: 5/10/2021  Impression:  1. New right basilar opacity may represent developing pneumonia. 2. Chronic scarring in the left perihilar region and left lower lobe. 3. Stable cardiomegaly.  Electronically Signed By-Nitza Ferreira MD On:5/10/2021 12:18 PM This report was finalized on [id number] by  Nitza Ferreira MD.      Results for orders placed during the hospital encounter of 04/26/21    Venous w Reflux Lower Extremity - Bilateral CAR    Interpretation Summary  · There is evidence of mild reflux in the right mid femoral vein. There is evidence of severe reflux in the right greater saphenous vein at mid thigh. There is evidence of mild reflux in the right greater  saphenous vein at the knee.  · Remaining veins on the right without reflux. The left lower extremity normal without reflux.      Results for orders placed during the hospital encounter of 04/26/21    Venous w Reflux Lower Extremity - Bilateral CAR    Interpretation Summary  · There is evidence of mild reflux in the right mid femoral vein. There is evidence of severe reflux in the right greater saphenous vein at mid thigh. There is evidence of mild reflux in the right greater saphenous vein at the knee.  · Remaining veins on the right without reflux. The left lower extremity normal without reflux.      Results for orders placed during the hospital encounter of 05/10/21    Adult Transthoracic Echo Complete W/ Cont if Necessary Per Protocol    Interpretation Summary  · Estimated left ventricular EF = 75% Estimated left ventricular EF was in agreement with the calculated left ventricular EF. Left ventricular systolic function is hyperdynamic (EF > 70%).  · Mild aortic valve stenosis is present.  · Moderate tricuspid valve regurgitation is present.  · Estimated right ventricular systolic pressure from tricuspid regurgitation is moderately elevated (45-55 mmHg).  · Moderate pulmonary hypertension is present.  · Left ventricular diastolic dysfunction is noted.              Test Results Pending at Discharge        Procedures Performed  Procedure(s):  ESOPHAGOGASTRODUODENOSCOPY ARGON PLASMA COAGULATION         Consults:   Consults     Date and Time Order Name Status Description    5/13/2021  9:21 AM Inpatient Pulmonology Consult Completed     5/11/2021  7:40 AM Inpatient Infectious Diseases Consult Completed     5/11/2021  7:40 AM Inpatient Gastroenterology Consult      5/10/2021  1:25 PM Hospitalist (on-call MD unless specified) Completed             Discharge Details        Discharge Medications      New Medications      Instructions Start Date   albuterol sulfate  (90 Base) MCG/ACT inhaler  Commonly known as:  PROVENTIL HFA;VENTOLIN HFA;PROAIR HFA   2 puffs, Inhalation, Every 4 Hours PRN      Atrovent HFA 17 MCG/ACT inhaler  Generic drug: ipratropium   2 puffs, Inhalation, Every 4 Hours PRN      cefTAZidime 2 g in sodium chloride 0.9 % 100 mL IVPB   2 g, Intravenous, Every 8 Hours      doxycycline 100 MG tablet  Commonly known as: ADOXA   100 mg, Oral, Every 12 Hours Scheduled      fluticasone-salmeterol 250-50 MCG/DOSE DISKUS  Commonly known as: Advair Diskus   1 puff, Inhalation, 2 Times Daily - RT         Continue These Medications      Instructions Start Date   acetaminophen 325 MG tablet  Commonly known as: TYLENOL   650 mg, Oral, Every 8 Hours PRN      allopurinol 100 MG tablet  Commonly known as: ZYLOPRIM   100 mg, Oral, Daily      ARIPiprazole 2 MG tablet  Commonly known as: ABILIFY   2 mg, Oral, Nightly      baclofen 10 MG tablet  Commonly known as: LIORESAL   5 mg, Oral, 2 Times Daily      cetirizine 10 MG tablet  Commonly known as: zyrTEC   10 mg, Oral, Daily      Cranberry 500 MG tablet   500 mg, Oral, 2 times daily      cyanocobalamin 1000 MCG/ML injection   1,000 mcg, Intramuscular, Every 28 Days, 11th each month       CYMBALTA PO   60 mg, Oral, Daily      Diclofenac Sodium 1 % gel gel  Commonly known as: VOLTAREN   4 g, Topical, 2 Times Daily      esomeprazole 40 MG capsule  Commonly known as: nexIUM   40 mg, Oral, 2 times daily      fenofibrate 145 MG tablet  Commonly known as: TRICOR   145 mg, Oral, Daily      fluticasone 50 MCG/ACT nasal spray  Commonly known as: FLONASE   1 spray, Nasal, Daily      furosemide 20 MG tablet  Commonly known as: LASIX   20 mg, Oral, Daily      guaiFENesin 100 MG/5ML syrup  Commonly known as: ROBITUSSIN   600 mg, Oral, Every 6 Hours PRN      HYDROcodone-acetaminophen 7.5-325 MG per tablet  Commonly known as: NORCO   1 tablet, Oral, Every 6 Hours PRN      ibuprofen 400 MG tablet  Commonly known as: ADVIL,MOTRIN   400 mg, Oral, 2 Times Daily PRN      ipratropium-albuterol  0.5-2.5 mg/3 ml nebulizer  Commonly known as: DUO-NEB   3 mL, Nebulization, Every 6 Hours PRN      levothyroxine 137 MCG tablet  Commonly known as: SYNTHROID, LEVOTHROID   137 mcg, Oral, Daily      lubiprostone 24 MCG capsule  Commonly known as: AMITIZA   24 mcg, Oral, 2 Times Daily With Meals      magnesium oxide 400 (241.3 Mg) MG tablet tablet  Commonly known as: MAGOX   400 mg, Oral, Daily      melatonin 5 MG tablet tablet   5 mg, Oral, Nightly      polyethylene glycol packet  Commonly known as: MIRALAX   17 g, Oral, Daily      potassium chloride 20 MEQ CR tablet  Commonly known as: K-DUR,KLOR-CON   20 mEq, Oral, Daily      pregabalin 50 MG capsule  Commonly known as: LYRICA   50 mg, Oral, Nightly      rOPINIRole 3 MG tablet  Commonly known as: REQUIP   3 mg, Oral, 3 Times Daily      senna 8.6 MG tablet  Commonly known as: SENOKOT   1 tablet, Oral, Nightly      simethicone 80 MG chewable tablet  Commonly known as: MYLICON   80 mg, Oral, Every 6 Hours PRN      Sinemet  MG per tablet  Generic drug: carbidopa-levodopa   2 tablets, Oral, 3 Times Daily      vitamin C 500 MG tablet  Commonly known as: ASCORBIC ACID   500 mg, Oral, Daily      vitamin D 1.25 MG (89499 UT) capsule capsule  Commonly known as: ERGOCALCIFEROL   50,000 Units, Oral, Weekly, Monday             Allergies   Allergen Reactions   • Latex Rash   • Morphine Confusion         Discharge Disposition:  Home-Health Care [facility] [place]    Diet:  Hospital:  Diet Order   Procedures   • Diet Gastrointestinal; Low Residue         Discharge Activity: As tolerated.        CODE STATUS:    Code Status and Medical Interventions:   Ordered at: 05/10/21 1401     Level Of Support Discussed With:    Patient     Code Status:    CPR     Medical Interventions (Level of Support Prior to Arrest):    Full         Follow-up Appointments  No future appointments.          Condition on Discharge:      Stable      This patient has been examined wearing appropriate Personal Protective  Equipment and discussed with hospital infection control department, [facility], infectious disease specialist and pulmonologist. 05/17/21      Electronically signed by Larry Junior MD, 05/17/21, 07:35 PM EDT.      Time: I spent  40  minutes on this discharge activity which included face-to-face encounter with the patient/reviewing the data in the system/coordination of the care with the nursing staff as well as consultants/documentation/entering orders.

## 2021-05-16 NOTE — THERAPY TREATMENT NOTE
Subjective: Pt agreeable to therapeutic plan of care. Patient agreed to sit EOB and stated it felt good.    Objective:     Bed mobility - Max-A  Transfers - N/A or Not attempted.  Ambulation -  feet N/A or Not attempted.due to patient has knee contractures and is nonamb    Pain: 0 VAS  Education: Provided education on importance of mobility and skilled verbal / tactile cueing throughout intervention.     Assessment: Lorenza Owens presents with functional mobility impairments which indicate the need for skilled intervention. Tolerating session today without incident. Patient tolerated EOB approx 15 mins, props with RUE, presents with R lean and seems confused today.On 2L O2 and sats 93% with HR 95. Will need to be jere for transfer. Will continue to follow and progress as tolerated.     Plan/Recommendations:   Pt would benefit from Inpatient Rehabilitation placement at discharge from facility and requires no DME at discharge.   Pt desires Inpatient Rehabilitation placement at discharge. Pt cooperative; agreeable to therapeutic recommendations and plan of care.     Basic Mobility 6-click:  Rollin = Total, A lot = 2, A little = 3; 4 = None  Supine>Sit:   1 = Total, A lot = 2, A little = 3; 4 = None   Sit>Stand with arms:  1 = Total, A lot = 2, A little = 3; 4 = None  Bed>Chair:   1 = Total, A lot = 2, A little = 3; 4 = None  Ambulate in room:  1 = Total, A lot = 2, A little = 3; 4 = None  3-5 Steps with railin = Total, A lot = 2, A little = 3; 4 = None  Score: 8    Modified Maria M: 5 = Severe disability (Requires constant nursing care and attention, bedridden, incontinent)    Post-Tx Position: Supine with HOB Elevated, Alarms activated and Call light and personal items within reach  PPE: gloves, surgical mask, eyewear protection

## 2021-05-16 NOTE — CONSULTS
PICC TEAM CONSULT: Powerglide midline placed in LINDA per MD order for outpatient antibiotic needs.  Pt tolerated procedure well.  Education given.  PGML flushes freely with blood return noted.  Rn aware ok to use now

## 2021-05-16 NOTE — PROGRESS NOTES
"PULMONARY CRITICAL CARE Progress  NOTE      PATIENT IDENTIFICATION:  Name: Lorenza Owens  MRN: [id number]  :  1935     Age: 86 y.o.  Sex: female    DATE OF Note:  2021   Referring Physician: Larry Junior MD                  Subjective:   Feeling better, no SOB, still on O2  No chest or abd pain  No new issues     Objective:  tMax 24 hrs: Temp (24hrs), Av.4 °F (36.9 °C), Min:97.2 °F (36.2 °C), Max:99.4 °F (37.4 °C)      Vitals Ranges:   Temp:  [97.2 °F (36.2 °C)-99.4 °F (37.4 °C)] 99.4 °F (37.4 °C)  Heart Rate:  [] 97  Resp:  [12-18] 18  BP: (116-139)/(47-70) 139/70    Intake and Output Last 3 Shifts:   I/O last 3 completed shifts:  In: 578 [P.O.:578]  Out: 900 [Urine:900]    Exam:  /70 (BP Location: Right arm, Patient Position: Lying)   Pulse 97   Temp 99.4 °F (37.4 °C) (Oral)   Resp 18   Ht 152.4 cm (60\")   Wt 96.1 kg (211 lb 13.8 oz) Comment: bed needs zeroed. pt unable to get up at this time   SpO2 97%   BMI 41.38 kg/m²     General Appearance: Alert    HEENT:  Normocephalic, without obvious abnormality, Conjunctiva/corneas clear,.  Normal external ear canals, Nares normal, no drainage     Neck:  Supple, symmetrical, trachea midline. No JVD.  Lungs /Chest wall:   Bilateral basal rhonchi, respirations unlabored symmetrical wall movement.     Heart:  Regular rate and rhythm, systolic murmur PMI left sternal border  Abdomen: Soft, non-tender, no masses, no organomegaly.    Extremities: Trace edema no clubbing or Cyanosis        Medications:    Current Facility-Administered Medications:   •  allopurinol (ZYLOPRIM) tablet 100 mg, 100 mg, Oral, Daily, Lori Light MD, 100 mg at 21 0919  •  ARIPiprazole (ABILIFY) tablet 2 mg, 2 mg, Oral, Nightly, Lori Light MD, 2 mg at 05/15/21 6158  •  baclofen (LIORESAL) tablet 5 mg, 5 mg, Oral, BID, Lori Light MD, 5 mg at 21 0985  •  sennosides-docusate (PERICOLACE) 8.6-50 MG per tablet 2 tablet, 2 tablet, " Oral, BID, 2 tablet at 05/16/21 0919 **AND** polyethylene glycol (MIRALAX) packet 17 g, 17 g, Oral, Daily PRN **AND** bisacodyl (DULCOLAX) EC tablet 5 mg, 5 mg, Oral, Daily PRN **AND** bisacodyl (DULCOLAX) suppository 10 mg, 10 mg, Rectal, Daily PRN, Lori Light MD  •  budesonide (PULMICORT) nebulizer solution 0.5 mg, 0.5 mg, Nebulization, BID - RT, Svetlana Mckeon APRN, 0.5 mg at 05/16/21 0630  •  carbidopa-levodopa (SINEMET)  MG per tablet 2 tablet, 2 tablet, Oral, TID, Lori Light MD, 2 tablet at 05/16/21 0919  •  cefTAZidime (FORTAZ) 2 g in sodium chloride 0.9 % 100 mL IVPB, 2 g, Intravenous, Q8H, Sean Lucio MD, Last Rate: 200 mL/hr at 05/16/21 0604, 2 g at 05/16/21 0604  •  cetirizine (zyrTEC) tablet 5 mg, 5 mg, Oral, Daily, Lori Light MD, 5 mg at 05/16/21 0919  •  Diclofenac Sodium (VOLTAREN) 1 % gel 4 g, 4 g, Topical, BID, Lori Light MD, 4 g at 05/16/21 0920  •  doxycycline (ADOXA) tablet 100 mg, 100 mg, Oral, Q12H, Lori Light MD, 100 mg at 05/16/21 0918  •  DULoxetine (CYMBALTA) DR capsule 60 mg, 60 mg, Oral, Daily, Lori Light MD, 60 mg at 05/16/21 0918  •  fenofibrate (TRICOR) tablet 145 mg, 145 mg, Oral, Daily, Lori Light MD, 145 mg at 05/16/21 0919  •  HYDROcodone-acetaminophen (NORCO) 7.5-325 MG per tablet 1 tablet, 1 tablet, Oral, Q6H PRN, Larry Junior MD, 1 tablet at 05/16/21 1146  •  ipratropium-albuterol (DUO-NEB) nebulizer solution 3 mL, 3 mL, Nebulization, Q4H PRN, Bradley Atkins MD  •  ipratropium-albuterol (DUO-NEB) nebulizer solution 3 mL, 3 mL, Nebulization, 4x Daily - RT, Svetlana Mckeon, APRN, 3 mL at 05/16/21 1100  •  levothyroxine (SYNTHROID, LEVOTHROID) tablet 137 mcg, 137 mcg, Oral, Daily, Lori Light MD, 137 mcg at 05/16/21 0920  •  lubiprostone (AMITIZA) capsule 24 mcg, 24 mcg, Oral, BID With Meals, Lori Light MD, 24 mcg at 05/16/21 0919  •  magnesium oxide (MAG-OX) tablet 400 mg, 400 mg, Oral, Daily, Nadege  Carlos Nava MD, 400 mg at 05/16/21 0918  •  melatonin tablet 5 mg, 5 mg, Oral, Nightly, Lori Light MD, 5 mg at 05/15/21 2159  •  ondansetron (ZOFRAN) tablet 4 mg, 4 mg, Oral, Q6H PRN **OR** ondansetron (ZOFRAN) injection 4 mg, 4 mg, Intravenous, Q6H PRN, Lori Light MD  •  pantoprazole (PROTONIX) EC tablet 40 mg, 40 mg, Oral, QAM, Lori Light MD, 40 mg at 05/16/21 0918  •  pregabalin (LYRICA) capsule 50 mg, 50 mg, Oral, Nightly, Lori Light MD, 50 mg at 05/15/21 2159  •  rOPINIRole (REQUIP) tablet 3 mg, 3 mg, Oral, TID, Lori Light MD, 3 mg at 05/16/21 0918  •  senna (SENOKOT) tablet 1 tablet, 1 tablet, Oral, Nightly, Lori Light MD, 1 tablet at 05/13/21 2138  •  simethicone (MYLICON) chewable tablet 80 mg, 80 mg, Oral, Q6H PRN, Lori Light MD  •  [COMPLETED] Insert peripheral IV, , , Once **AND** sodium chloride 0.9 % flush 10 mL, 10 mL, Intravenous, PRN, Lori Light MD  •  sodium chloride 0.9 % flush 10 mL, 10 mL, Intravenous, Q12H, Lori Light MD, 10 mL at 05/16/21 0920  •  sodium chloride 0.9 % flush 10 mL, 10 mL, Intravenous, PRN, Lori Light MD  •  sodium chloride 0.9 % infusion, 100 mL/hr, Intravenous, Continuous, Lori Light MD, Stopped at 05/11/21 2130  •  sodium chloride 0.9 % infusion, 9 mL/hr, Intravenous, Continuous PRN, Lori Light MD  •  sucralfate (CARAFATE) tablet 1 g, 1 g, Oral, 4x Daily AC & at Bedtime, Lori Light MD, 1 g at 05/16/21 1146    Data Review:  All labs (24hrs):   Recent Results (from the past 24 hour(s))   CBC Auto Differential    Collection Time: 05/16/21  1:53 AM    Specimen: Blood   Result Value Ref Range    WBC 4.50 3.40 - 10.80 10*3/mm3    RBC 2.76 (L) 3.77 - 5.28 10*6/mm3    Hemoglobin 7.5 (L) 12.0 - 15.9 g/dL    Hematocrit 23.6 (L) 34.0 - 46.6 %    MCV 85.5 79.0 - 97.0 fL    MCH 27.4 26.6 - 33.0 pg    MCHC 32.0 31.5 - 35.7 g/dL    RDW 18.9 (H) 12.3 - 15.4 %    RDW-SD 57.3 (H) 37.0 - 54.0 fl    MPV 7.0  6.0 - 12.0 fL    Platelets 166 140 - 450 10*3/mm3    Neutrophil % 72.4 42.7 - 76.0 %    Lymphocyte % 15.2 (L) 19.6 - 45.3 %    Monocyte % 9.0 5.0 - 12.0 %    Eosinophil % 3.0 0.3 - 6.2 %    Basophil % 0.4 0.0 - 1.5 %    Neutrophils, Absolute 3.30 1.70 - 7.00 10*3/mm3    Lymphocytes, Absolute 0.70 0.70 - 3.10 10*3/mm3    Monocytes, Absolute 0.40 0.10 - 0.90 10*3/mm3    Eosinophils, Absolute 0.10 0.00 - 0.40 10*3/mm3    Basophils, Absolute 0.00 0.00 - 0.20 10*3/mm3    nRBC 0.1 0.0 - 0.2 /100 WBC   Basic Metabolic Panel    Collection Time: 05/16/21  2:13 AM    Specimen: Blood   Result Value Ref Range    Glucose 109 (H) 65 - 99 mg/dL    BUN 18 8 - 23 mg/dL    Creatinine 0.73 0.57 - 1.00 mg/dL    Sodium 141 136 - 145 mmol/L    Potassium 4.2 3.5 - 5.2 mmol/L    Chloride 105 98 - 107 mmol/L    CO2 30.0 (H) 22.0 - 29.0 mmol/L    Calcium 9.1 8.6 - 10.5 mg/dL    eGFR Non African Amer 76 >60 mL/min/1.73    BUN/Creatinine Ratio 24.7 7.0 - 25.0    Anion Gap 6.0 5.0 - 15.0 mmol/L        Imaging:  Adult Transthoracic Echo Complete W/ Cont if Necessary Per Protocol  · Estimated left ventricular EF = 75% Estimated left ventricular EF was in   agreement with the calculated left ventricular EF. Left ventricular   systolic function is hyperdynamic (EF > 70%).  · Mild aortic valve stenosis is present.  · Moderate tricuspid valve regurgitation is present.  · Estimated right ventricular systolic pressure from tricuspid   regurgitation is moderately elevated (45-55 mmHg).  · Moderate pulmonary hypertension is present.  · Left ventricular diastolic dysfunction is noted.          ASSESSMENT:  Acute hypoxic respiratory failure  Multifocal pneumonia  CLAUDIA  Right pleural effusion  GI Bleed   Anemia  GERD  Fibromyalgia  Hyperlipidemia  Hypothyroidism  PVD  Polyarthritis        PLAN:  Needs rehab at discharge   Antibiotics Doxy/ceftazidime   Bronchodilator  Inhaled corticosteroids  O2 support as needed   Incentive spirometer  Monitor H & H,  transfuse less that 7.0  Electrolytes/ glycemic control  DVT and GI prophylaxis.     Discussed with Dr Bernardino Mckeon, APRN   5/16/2021  12:12 EDT     I personally have examined  and interviewed the patient. I have reviewed the history, data, problems, assessment and plan with our NP.  Critical care time in direct medical management (   ) minutes  Electronically signed by Beth Lebron MD D,ABS, 05/16/21, 9:31 PM EDT.

## 2021-05-17 VITALS
DIASTOLIC BLOOD PRESSURE: 60 MMHG | HEIGHT: 60 IN | RESPIRATION RATE: 20 BRPM | OXYGEN SATURATION: 93 % | WEIGHT: 208.11 LBS | HEART RATE: 89 BPM | BODY MASS INDEX: 40.86 KG/M2 | TEMPERATURE: 98.4 F | SYSTOLIC BLOOD PRESSURE: 140 MMHG

## 2021-05-17 PROCEDURE — 99239 HOSP IP/OBS DSCHRG MGMT >30: CPT | Performed by: INTERNAL MEDICINE

## 2021-05-17 PROCEDURE — 94799 UNLISTED PULMONARY SVC/PX: CPT

## 2021-05-17 PROCEDURE — 25010000002 CEFTAZIDIME PER 500 MG: Performed by: INTERNAL MEDICINE

## 2021-05-17 RX ADMIN — CEFTAZIDIME 2 G: 2 INJECTION, POWDER, FOR SOLUTION INTRAVENOUS at 05:52

## 2021-05-17 RX ADMIN — LUBIPROSTONE 24 MCG: 24 CAPSULE, GELATIN COATED ORAL at 08:04

## 2021-05-17 RX ADMIN — Medication 10 ML: at 08:05

## 2021-05-17 RX ADMIN — DOXYCYCLINE 100 MG: 100 TABLET, FILM COATED ORAL at 08:05

## 2021-05-17 RX ADMIN — BUDESONIDE 0.5 MG: 0.5 SUSPENSION RESPIRATORY (INHALATION) at 07:11

## 2021-05-17 RX ADMIN — CARBIDOPA AND LEVODOPA 2 TABLET: 25; 100 TABLET ORAL at 15:09

## 2021-05-17 RX ADMIN — BACLOFEN 5 MG: 10 TABLET ORAL at 08:05

## 2021-05-17 RX ADMIN — DOCUSATE SODIUM 50 MG AND SENNOSIDES 8.6 MG 2 TABLET: 8.6; 5 TABLET, FILM COATED ORAL at 08:04

## 2021-05-17 RX ADMIN — SUCRALFATE 1 G: 1 TABLET ORAL at 08:04

## 2021-05-17 RX ADMIN — ALLOPURINOL 100 MG: 100 TABLET ORAL at 08:05

## 2021-05-17 RX ADMIN — HYDROCODONE BITARTRATE AND ACETAMINOPHEN 1 TABLET: 7.5; 325 TABLET ORAL at 16:16

## 2021-05-17 RX ADMIN — DULOXETINE HYDROCHLORIDE 60 MG: 30 CAPSULE, DELAYED RELEASE ORAL at 08:05

## 2021-05-17 RX ADMIN — LEVOTHYROXINE SODIUM 137 MCG: 0.03 TABLET ORAL at 08:04

## 2021-05-17 RX ADMIN — PANTOPRAZOLE SODIUM 40 MG: 40 TABLET, DELAYED RELEASE ORAL at 06:00

## 2021-05-17 RX ADMIN — FENOFIBRATE 145 MG: 145 TABLET ORAL at 08:05

## 2021-05-17 RX ADMIN — ROPINIROLE HYDROCHLORIDE 3 MG: 1 TABLET, FILM COATED ORAL at 08:04

## 2021-05-17 RX ADMIN — IPRATROPIUM BROMIDE AND ALBUTEROL SULFATE 3 ML: 2.5; .5 SOLUTION RESPIRATORY (INHALATION) at 07:10

## 2021-05-17 RX ADMIN — SUCRALFATE 1 G: 1 TABLET ORAL at 11:23

## 2021-05-17 RX ADMIN — ROPINIROLE HYDROCHLORIDE 3 MG: 1 TABLET, FILM COATED ORAL at 15:08

## 2021-05-17 RX ADMIN — CARBIDOPA AND LEVODOPA 2 TABLET: 25; 100 TABLET ORAL at 08:05

## 2021-05-17 RX ADMIN — DICLOFENAC SODIUM 4 G: 10 GEL TOPICAL at 08:05

## 2021-05-17 RX ADMIN — CETIRIZINE HYDROCHLORIDE 5 MG: 10 TABLET, FILM COATED ORAL at 08:04

## 2021-05-17 RX ADMIN — MAGNESIUM GLUCONATE 500 MG ORAL TABLET 400 MG: 500 TABLET ORAL at 08:04

## 2021-05-17 RX ADMIN — CEFTAZIDIME 2 G: 2 INJECTION, POWDER, FOR SOLUTION INTRAVENOUS at 14:42

## 2021-05-17 RX ADMIN — HYDROCODONE BITARTRATE AND ACETAMINOPHEN 1 TABLET: 7.5; 325 TABLET ORAL at 01:51

## 2021-05-17 NOTE — PROGRESS NOTES
Infectious Diseases Progress Note      LOS: 7 days   Patient Care Team:  Shikha Mcmahon APRN as PCP - General (Family Medicine)    Chief Complaint: Shortness of breath, fatigue    Subjective       The patient has been afebrile for the last 24 hours.  The patient is on 4 L of oxygen by nasal cannula, hemodynamically stable, and is tolerating antimicrobial therapy.      Review of Systems:   Review of Systems   Constitutional: Positive for fatigue.   HENT: Negative.    Eyes: Negative.    Respiratory: Positive for cough and shortness of breath.    Cardiovascular: Positive for leg swelling.   Gastrointestinal: Negative.    Endocrine: Negative.    Genitourinary: Negative.    Musculoskeletal: Negative.    Skin: Positive for wound.   Neurological: Positive for weakness.   Psychiatric/Behavioral: Negative.    All other systems reviewed and are negative.       Objective     Vital Signs  Temp:  [97.9 °F (36.6 °C)-98.3 °F (36.8 °C)] 98.3 °F (36.8 °C)  Heart Rate:  [82-96] 85  Resp:  [16-22] 22  BP: (128-155)/(58-63) 129/62    Physical Exam:  Physical Exam  Vitals and nursing note reviewed.   Constitutional:       General: She is not in acute distress.     Appearance: Normal appearance. She is well-developed and normal weight. She is ill-appearing. She is not diaphoretic.   HENT:      Head: Normocephalic and atraumatic.   Eyes:      General: No scleral icterus.     Extraocular Movements: Extraocular movements intact.      Conjunctiva/sclera: Conjunctivae normal.      Pupils: Pupils are equal, round, and reactive to light.   Cardiovascular:      Rate and Rhythm: Normal rate and regular rhythm.      Heart sounds: Normal heart sounds, S1 normal and S2 normal. No murmur heard.     Pulmonary:      Effort: Pulmonary effort is normal. No respiratory distress.      Breath sounds: No stridor. Rales present. No wheezing.   Chest:      Chest wall: No tenderness.   Abdominal:      General: Bowel sounds are normal. There is no distension.       Palpations: Abdomen is soft. There is no mass.      Tenderness: There is no abdominal tenderness. There is no guarding.   Genitourinary:     Comments: External catheter  Musculoskeletal:         General: No swelling, tenderness or deformity. Normal range of motion.      Cervical back: Neck supple.   Skin:     General: Skin is warm and dry.      Coloration: Skin is pale.      Findings: No bruising, erythema or rash.      Comments: Chronic venous stasis changes to both lower legs with some shallow ulcerations   Neurological:      General: No focal deficit present.      Mental Status: She is alert and oriented to person, place, and time. Mental status is at baseline.      Cranial Nerves: No cranial nerve deficit.   Psychiatric:         Mood and Affect: Mood normal.          Results Review:    I have reviewed all clinical data, test, lab, and imaging results.     Radiology  No Radiology Exams Resulted Within Past 24 Hours    Cardiology    Laboratory    Results from last 7 days   Lab Units 05/16/21  0153 05/14/21  0235 05/13/21  0234 05/12/21  0238 05/11/21  0935 05/11/21  0220   WBC 10*3/mm3 4.50 4.50 5.00 4.40  --  4.60   HEMOGLOBIN g/dL 7.5* 7.9* 7.6* 7.6* 6.7* 6.0*   HEMATOCRIT % 23.6* 24.3* 23.4* 23.5* 21.0* 18.7*   PLATELETS 10*3/mm3 166 194 194 189  --  188     Results from last 7 days   Lab Units 05/16/21  0213 05/14/21  0235 05/13/21  0234 05/12/21  0238 05/11/21  0220   SODIUM mmol/L 141 143 141 141 140   POTASSIUM mmol/L 4.2 3.9 3.8 4.2 4.4   CHLORIDE mmol/L 105 107 108* 108* 107   CO2 mmol/L 30.0* 29.0 25.0 25.0 24.0   BUN mg/dL 18 16 17 22 25*   CREATININE mg/dL 0.73 0.75 0.76 1.00 1.06*   GLUCOSE mg/dL 109* 133* 122* 100* 114*   ALBUMIN g/dL  --   --  3.10*  --   --    BILIRUBIN mg/dL  --   --  0.5  --   --    ALK PHOS U/L  --   --  53  --   --    AST (SGOT) U/L  --   --  18  --   --    ALT (SGPT) U/L  --   --  <5  --   --    CALCIUM mg/dL 9.1 9.1 9.1 8.7 8.4*                  Microbiology   Microbiology Results (last 10 days)     Procedure Component Value - Date/Time    Respiratory Culture - Sputum, Cough [[id number]] Collected: 05/13/21 1134    Lab Status: Final result Specimen: Sputum from Cough Updated: 05/15/21 1033     Respiratory Culture Light growth (2+) Normal Respiratory Mohini: NO S.aureus/MRSA or Pseudomonas aeruginosa     Gram Stain Moderate (3+) WBCs per low power field      Few (2+) Gram positive cocci in clusters      Rare (1+) Gram positive cocci in pairs    Legionella Antigen, Urine - Urine, Urine, Clean Catch [[id number]]  (Normal) Collected: 05/12/21 1707    Lab Status: Final result Specimen: Urine, Clean Catch Updated: 05/12/21 1743     LEGIONELLA ANTIGEN, URINE Negative    S. Pneumo Ag Urine or CSF - Urine, Urine, Clean Catch [[id number]]  (Normal) Collected: 05/12/21 1707    Lab Status: Final result Specimen: Urine, Clean Catch Updated: 05/12/21 1743     Strep Pneumo Ag Negative    Respiratory Panel, PCR (WITHOUT COVID) - Swab, Nasopharynx [[id number]]  (Normal) Collected: 05/12/21 1436    Lab Status: Final result Specimen: Swab from Nasopharynx Updated: 05/12/21 1542     ADENOVIRUS, PCR Not Detected     Coronavirus 229E Not Detected     Coronavirus HKU1 Not Detected     Coronavirus NL63 Not Detected     Coronavirus OC43 Not Detected     Human Metapneumovirus Not Detected     Human Rhinovirus/Enterovirus Not Detected     Influenza B PCR Not Detected     Parainfluenza Virus 1 Not Detected     Parainfluenza Virus 2 Not Detected     Parainfluenza Virus 3 Not Detected     Parainfluenza Virus 4 Not Detected     Bordetella pertussis pcr Not Detected     Chlamydophila pneumoniae PCR Not Detected     Mycoplasma pneumo by PCR Not Detected     Influenza A PCR Not Detected     RSV, PCR Not Detected     Bordetella parapertussis PCR Not Detected    Narrative:      The coronavirus on the RVP is NOT COVID-19 and is NOT indicative of infection with COVID-19.     Blood Culture -  Blood, Arm, Right [[id number]] Collected: 05/10/21 1523    Lab Status: Final result Specimen: Blood from Arm, Right Updated: 05/15/21 1530     Blood Culture No growth at 5 days    COVID-19, [facility]-HOUSE,NASAL Swab (NO TRANSPORT MEDIA) 2 HR TAT - Swab, Nasopharynx [[id number]]  (Normal) Collected: 05/10/21 1503    Lab Status: Final result Specimen: Swab from Nasopharynx Updated: 05/10/21 1544     COVID19 Presumptive Negative    Narrative:      Fact sheet for providers: https://www.fda.gov/media/452960/download     Fact sheet for patients: https://www.fda.gov/media/701981/download    Test performed by PCR.  If inconsistent with clinical signs and symptoms patient should be tested with different authorized molecular test.    Blood Culture - Blood, Arm, Right [[id number]] Collected: 05/10/21 1348    Lab Status: Final result Specimen: Blood from Arm, Right Updated: 05/15/21 1400     Blood Culture No growth at 5 days          Medication Review:       Schedule Meds  allopurinol, 100 mg, Oral, Daily  ARIPiprazole, 2 mg, Oral, Nightly  baclofen, 5 mg, Oral, BID  budesonide, 0.5 mg, Nebulization, BID - RT  carbidopa-levodopa, 2 tablet, Oral, TID  cefTAZidime, 2 g, Intravenous, Q8H  cetirizine, 5 mg, Oral, Daily  Diclofenac Sodium, 4 g, Topical, BID  doxycycline, 100 mg, Oral, Q12H  DULoxetine, 60 mg, Oral, Daily  fenofibrate, 145 mg, Oral, Daily  ipratropium-albuterol, 3 mL, Nebulization, 4x Daily - RT  levothyroxine, 137 mcg, Oral, Daily  lubiprostone, 24 mcg, Oral, BID With Meals  magnesium oxide, 400 mg, Oral, Daily  melatonin, 5 mg, Oral, Nightly  pantoprazole, 40 mg, Oral, QAM  pregabalin, 50 mg, Oral, Nightly  rOPINIRole, 3 mg, Oral, TID  senna, 1 tablet, Oral, Nightly  senna-docusate sodium, 2 tablet, Oral, BID  sodium chloride, 10 mL, Intravenous, Q12H  sodium chloride, 10 mL, Intravenous, Q12H  sucralfate, 1 g, Oral, 4x Daily AC & at Bedtime        Infusion Meds  sodium chloride, 100 mL/hr, Last Rate: Stopped  (05/11/21 2130)  sodium chloride, 9 mL/hr        PRN Meds  senna-docusate sodium **AND** polyethylene glycol **AND** bisacodyl **AND** bisacodyl  •  HYDROcodone-acetaminophen  •  ipratropium-albuterol  •  ondansetron **OR** ondansetron  •  simethicone  •  [COMPLETED] Insert peripheral IV **AND** sodium chloride  •  sodium chloride  •  sodium chloride  •  sodium chloride        Assessment/Plan       Antimicrobial Therapy   1.  Doxycycline      day  2.  Fortaz      day  3.      Day  4.      Day  5.      Day      Assessment     Multifocal pneumonia.  Patient is currently on 3 days of oxygen.  Patient is baseline at the nursing home was room air  COVID-19 screen was negative on admission  -Currently on 3 L of oxygen by nasal cannula  -Pneumococcal and Legionella urine antigen was negative, respiratory viral DNA panel was negative     Severe anemia-hemoglobin was 4.1 at admission and an EGD completed on 5/11/2021 showed AV malformations which were cauterized.  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  Patient has superficial venous stasis ulceration on the right leg with some greenish drainage.  Culture prior to admission grew Pseudomonas aeruginosa and MRSA     Peripheral vascular disease        Plan     Continue doxycycline 100 mg p.o. every 12 hours  Continue ceftazidime 2 g IV every 12 hours, can discontinue after tomorrow's dose  Can finish the remaining course of IV ceftazidime at the nursing home  Incentive spirometer  Continue supportive care  A.m. labs  Okay to discharge from Infectious Disease standpoint to rehab          Sean Lucio MD  05/17/21  12:53 EDT      Note is dictated utilizing voice recognition software/Dragon

## 2021-05-17 NOTE — PROGRESS NOTES
[facility] Medicine Services Daily Progress Note      Hospitalist Team  LOS 7 days      Patient Care Team:  Shikha Mcmahon APRN as PCP - General (Family Medicine)    Patient Location: 2113/1      Subjective   Subjective     Chief Complaint / Subjective  Chief Complaint   Patient presents with   • Abnormal Lab         Brief Synopsis of Hospital Course/HPI  Patient is an 86-year-old female with past medical history of Anemia, GERD, fibromyalgia, polyosteoarthritis, bedbound for more than 10 years, using  wheelchair for transportation, hypothyroidism, depression, bilateral lower extremity edema, hemorrhoids, hyperlipidemia, chronic low back pain, wheelchair dependence, CLAUDIA, peripheral vascular insufficiency, bilateral lower extremity cellulitis who presented to the emergency room because of abnormal labs, hemoglobin 4.1 and shortness of breath.  Patient was seen in the emergency room and Hemoccult was negative.  Patient reported having dark tarry stools 2 weeks prior to presentation to the emergency room.  Patient was seen in the emergency room and was diagnosed with GI bleed and a right lower lobe pneumonia.  Patient was recommended for admission for further treatment and management.  Patient was started on a transfusion of packed red blood cells in the emergency room prior to transfer to PCU.    GI ID and pulmonary consults were completed.     Patient reported significant improvement in her symptoms.        Date:: 5/16/2021.        Review of Systems   Constitutional: Negative.   HENT: Negative.    Eyes: Negative.    Cardiovascular: Negative.    Respiratory: Negative.    Endocrine: Negative.    Hematologic/Lymphatic: Negative.    Skin: Negative.    Musculoskeletal: Negative.    Gastrointestinal: Negative.    Genitourinary: Negative.    Neurological: Negative.    Psychiatric/Behavioral: Negative.    Allergic/Immunologic: Negative.          Objective   Objective      Vital Signs  Temp:  [97.9  "°F (36.6 °C)-98.5 °F (36.9 °C)] 97.9 °F (36.6 °C)  Heart Rate:  [82-98] 82  Resp:  [16-18] 18  BP: (128-155)/(57-63) 128/58  Oxygen Therapy  SpO2: 96 %  Pulse Oximetry Type: Continuous  Device (Oxygen Therapy): nasal cannula  Flow (L/min): 3 (titrated to 2L)  ETCO2 (mmHg): 32 mmHg  Flowsheet Rows      First Filed Value   Admission Height  152.4 cm (60\") Documented at 05/10/2021 1122   Admission Weight  104 kg (230 lb) Documented at 05/10/2021 1122        Intake & Output (last 3 days)       05/14 0701 - 05/15 0700 05/15 0701 - 05/16 0700 05/16 0701 - 05/17 0700 05/17 0701 - 05/18 0700    P.O. 120 578      Total Intake(mL/kg) 120 (1.2) 578 (6)      Urine (mL/kg/hr) 750 (0.3) 500 (0.2) 1600 (0.7)     Stool   0     Total Output      Net -630 +78 -1600             Urine Unmeasured Occurrence 2 x  1 x     Stool Unmeasured Occurrence  2 x 2 x         Lines, Drains & Airways    Active LDAs     Name:   Placement date:   Placement time:   Site:   Days:    Peripheral IV 05/10/21 1151 Left Antecubital   05/10/21    1151    Antecubital   less than 1    Peripheral IV 05/10/21 1152 Right Antecubital   05/10/21    1152    Antecubital   less than 1    External Urinary Catheter   05/10/21    2000    --   less than 1                  Physical Exam:        Physical Exam  Constitutional:       General: She is not in acute distress.     Comments: Patient was somnolent but easily aroused.   HENT:      Head: Normocephalic and atraumatic.      Nose: Nose normal. No congestion or rhinorrhea.      Mouth/Throat:      Mouth: Mucous membranes are moist.      Pharynx: Oropharynx is clear. No oropharyngeal exudate or posterior oropharyngeal erythema.   Eyes:      Pupils: Pupils are equal, round, and reactive to light.   Cardiovascular:      Pulses: Normal pulses.      Heart sounds: No murmur heard.   No friction rub. No gallop.       Comments: S1 and S2 present.  No tachycardia.  Pulmonary:      Effort: No respiratory distress.      " Breath sounds: No wheezing or rales.      Comments: Good air entry bilaterally.  Mild basilar crackles.  Chest:      Chest wall: No tenderness.   Abdominal:      General: Abdomen is flat. Bowel sounds are normal. There is no distension.      Palpations: Abdomen is soft.      Tenderness: There is no right CVA tenderness.   Musculoskeletal:         General: No swelling, tenderness, deformity or signs of injury.      Cervical back: Neck supple. No tenderness.      Right lower leg: No edema.      Left lower leg: No edema.   Skin:     Capillary Refill: Capillary refill takes less than 2 seconds.      Coloration: Skin is not jaundiced.      Findings: No bruising, lesion or rash.   Neurological:      Comments: No facial asymmetry noted.  Gait and station not tested.    Psychiatric:      Comments: No agitation.               Wounds (last 24 hours)      LDA Wound     Row Name 05/17/21 0414 05/17/21 0005 05/16/21 2000       Wound 05/10/21 1833 Bilateral lower leg    Wound - Properties Group Placement Date: 05/10/21  -CL Placement Time: 1833 -CL Side: Bilateral  -CL Orientation: lower  -CL Location: leg  -CL Stage, Pressure Injury : other (see comments)  -CL    Closure  OLIVE  -SE  OLIVE  -SE  OLIVE  -SE    Base  dressing in place, unable to visualize  -SE  dressing in place, unable to visualize  -SE  dressing in place, unable to visualize  -SE    Retired Wound - Properties Group Date first assessed: 05/10/21  -CL Time first assessed: 1833 -CL Side: Bilateral  -CL Location: leg  -CL    Row Name 05/16/21 1600             Wound 05/10/21 1833 Bilateral lower leg    Wound - Properties Group Placement Date: 05/10/21  -CL Placement Time: 1833 -CL Side: Bilateral  -CL Orientation: lower  -CL Location: leg  -CL Stage, Pressure Injury : other (see comments)  -CL    Care, Wound  cleansed with;sterile normal saline  -MW      Dressing Care  dressing changed  -MW      Retired Wound - Properties Group Date first assessed: 05/10/21  -CL Time  first assessed: 1833  -CL Side: Bilateral  -CL Location: leg  -CL      User Key  (r) = Recorded By, (t) = Taken By, (c) = Cosigned By    Initials Name Provider Type    Lashaun Marin LPN Registered Nurse    Kristine Estes, RN Registered Nurse    Tanja Guzman, RN Registered Nurse          Procedures:    Procedure(s):  ESOPHAGOGASTRODUODENOSCOPY          Results Review:     I reviewed the patient's new clinical results.      Lab Results (last 24 hours)     ** No results found for the last 24 hours. **        No results found for: HGBA1C  Results from last 7 days   Lab Units 05/10/21  1151   INR  1.21*       Results from last 7 days   Lab Units 05/14/21  1130   PH, ARTERIAL pH units 7.341*   PO2 ART mm Hg 92.0   PCO2, ARTERIAL mm Hg 61.3*   HCO3 ART mmol/L 33.2*     Lab Results   Component Value Date    LIPASE 27 03/25/2021     No results found for: CHOL, CHLPL, TRIG, HDL, LDL, LDLDIRECT    No results found for: INTRAOP, PREDX, FINALDX, COMDX    Microbiology Results (last 10 days)     Procedure Component Value - Date/Time    Respiratory Culture - Sputum, Cough [[id number]] Collected: 05/13/21 1134    Lab Status: Final result Specimen: Sputum from Cough Updated: 05/15/21 1033     Respiratory Culture Light growth (2+) Normal Respiratory Mohini: NO S.aureus/MRSA or Pseudomonas aeruginosa     Gram Stain Moderate (3+) WBCs per low power field      Few (2+) Gram positive cocci in clusters      Rare (1+) Gram positive cocci in pairs    Legionella Antigen, Urine - Urine, Urine, Clean Catch [[id number]]  (Normal) Collected: 05/12/21 1707    Lab Status: Final result Specimen: Urine, Clean Catch Updated: 05/12/21 1743     LEGIONELLA ANTIGEN, URINE Negative    S. Pneumo Ag Urine or CSF - Urine, Urine, Clean Catch [[id number]]  (Normal) Collected: 05/12/21 1707    Lab Status: Final result Specimen: Urine, Clean Catch Updated: 05/12/21 1743     Strep Pneumo Ag Negative    Respiratory Panel, PCR (WITHOUT COVID) - Swab,  Nasopharynx [[id number]]  (Normal) Collected: 05/12/21 1436    Lab Status: Final result Specimen: Swab from Nasopharynx Updated: 05/12/21 1542     ADENOVIRUS, PCR Not Detected     Coronavirus 229E Not Detected     Coronavirus HKU1 Not Detected     Coronavirus NL63 Not Detected     Coronavirus OC43 Not Detected     Human Metapneumovirus Not Detected     Human Rhinovirus/Enterovirus Not Detected     Influenza B PCR Not Detected     Parainfluenza Virus 1 Not Detected     Parainfluenza Virus 2 Not Detected     Parainfluenza Virus 3 Not Detected     Parainfluenza Virus 4 Not Detected     Bordetella pertussis pcr Not Detected     Chlamydophila pneumoniae PCR Not Detected     Mycoplasma pneumo by PCR Not Detected     Influenza A PCR Not Detected     RSV, PCR Not Detected     Bordetella parapertussis PCR Not Detected    Narrative:      The coronavirus on the RVP is NOT COVID-19 and is NOT indicative of infection with COVID-19.     Blood Culture - Blood, Arm, Right [[id number]] Collected: 05/10/21 1523    Lab Status: Final result Specimen: Blood from Arm, Right Updated: 05/15/21 1530     Blood Culture No growth at 5 days    COVID-19, [facility]OTT IN-HOUSE,NASAL Swab (NO TRANSPORT MEDIA) 2 HR TAT - Swab, Nasopharynx [[id number]]  (Normal) Collected: 05/10/21 1503    Lab Status: Final result Specimen: Swab from Nasopharynx Updated: 05/10/21 1544     COVID19 Presumptive Negative    Narrative:      Fact sheet for providers: https://www.fda.gov/media/606811/download     Fact sheet for patients: https://www.fda.gov/media/938521/download    Test performed by PCR.  If inconsistent with clinical signs and symptoms patient should be tested with different authorized molecular test.    Blood Culture - Blood, Arm, Right [[id number]] Collected: 05/10/21 1348    Lab Status: Final result Specimen: Blood from Arm, Right Updated: 05/15/21 1400     Blood Culture No growth at 5 days          ECG/EMG Results (most recent)     Procedure Component Value  Units Date/Time    ECG 12 Lead [[id number]] Collected: 05/10/21 1133     Updated: 05/12/21 0802     QT Interval 383 ms     Narrative:      HEART RATE= 84  bpm  RR Interval= 716  ms  TN Interval= 155  ms  P Horizontal Axis= -14  deg  P Front Axis= 63  deg  QRSD Interval= 83  ms  QT Interval= 383  ms  QRS Axis= 56  deg  T Wave Axis= 121  deg  - ABNORMAL ECG -  Sinus rhythm  multiple pvc  When compared with ECG of 25-Mar-2021 13:44:48,  No significant change  Electronically Signed By: Andrew Vann (Pablo) 12-May-2021 08:01:35  Date and Time of Study: 2021-05-10 11:33:34    Adult Transthoracic Echo Complete W/ Cont if Necessary Per Protocol [[id number]] Collected: 05/13/21 1347     Updated: 05/13/21 1638     BSA 1.9 m^2      RVIDd 2.6 cm      IVSd 1.0 cm      IVSs 1.8 cm      LVIDd 5.2 cm      LVIDs 2.5 cm      LVPWd 0.98 cm      BH CV ECHO ARIELLE - LVPWS 1.7 cm      IVS/LVPW 1.1     FS 52.0 %      EDV(Teich) 128.1 ml      ESV(Teich) 22.0 ml      EF(Teich) 82.8 %      EDV(cubed) 138.6 ml      ESV(cubed) 15.3 ml      EF(cubed) 88.9 %      % IVS thick 75.9 %      % LVPW thick 71.6 %      LV mass(C)d 194.6 grams      LV mass(C)dI 102.5 grams/m^2      LV mass(C)s 166.4 grams      LV mass(C)sI 87.7 grams/m^2      SV(Teich) 106.1 ml      SI(Teich) 55.9 ml/m^2      SV(cubed) 123.3 ml      SI(cubed) 64.9 ml/m^2      Ao root diam 2.9 cm      Ao root area 6.8 cm^2      ACS 1.6 cm      LVOT diam 1.9 cm      LVOT area 2.8 cm^2      EDV(MOD-sp4) 62.0 ml      ESV(MOD-sp4) 16.2 ml      EF(MOD-sp4) 74.0 %      SV(MOD-sp4) 45.9 ml      SI(MOD-sp4) 24.2 ml/m^2      Ao root area (BSA corrected) 1.6     LV Willams Vol (BSA corrected) 32.7 ml/m^2      LV Sys Vol (BSA corrected) 8.5 ml/m^2      MV E max gretel 161.5 cm/sec      MV A max gretel 161.0 cm/sec      MV E/A 1.0     MV V2 max 192.1 cm/sec      MV max PG 14.8 mmHg      MV V2 mean 127.9 cm/sec      MV mean PG 7.2 mmHg      MV V2 VTI 49.1 cm      MVA(VTI) 1.8 cm^2      MV dec slope 660.6 cm/sec^2       MV dec time 0.24 sec      Ao pk luca 274.7 cm/sec      Ao max PG 30.9 mmHg      Ao max PG (full) 20.0 mmHg      Ao V2 mean 190.2 cm/sec      Ao mean PG 16.8 mmHg      Ao mean PG (full) 11.3 mmHg      Ao V2 VTI 52.6 cm      HARLEY(I,A) 1.7 cm^2      HARLEY(I,D) 1.7 cm^2      HARLEY(V,A) 1.7 cm^2      HARLEY(V,D) 1.7 cm^2      LV V1 max PG 10.9 mmHg      LV V1 mean PG 5.4 mmHg      LV V1 max 165.1 cm/sec      LV V1 mean 107.3 cm/sec      LV V1 VTI 31.6 cm      SV(Ao) 359.7 ml      SI(Ao) 189.5 ml/m^2      SV(LVOT) 89.6 ml      SI(LVOT) 47.2 ml/m^2      PA V2 max 173.1 cm/sec      PA max PG 12.0 mmHg      PA max PG (full) 5.2 mmHg      PA V2 mean 124.0 cm/sec      PA mean PG 6.7 mmHg      PA mean PG (full) 3.2 mmHg      PA V2 VTI 33.9 cm      PA acc time 0.09 sec      RV V1 max PG 6.7 mmHg      RV V1 mean PG 3.5 mmHg      RV V1 max 129.8 cm/sec      RV V1 mean 87.4 cm/sec      RV V1 VTI 25.7 cm      TR max luca 366.4 cm/sec      RVSP(TR) 56.8 mmHg      RAP systole 3.0 mmHg      PA pr(Accel) 38.5 mmHg      Pulm Sys Luca 56.2 cm/sec      Pulm Willams Luca 73.0 cm/sec      Pulm S/D 0.77     Pulm A Revs Dur 0.08 sec      Pulm A Revs Luca 34.5 cm/sec       CV ECHO ARIELLE - BZI_BMI 40.6 kilograms/m^2       CV ECHO ARIELLE - BSA(HAYCOCK) 2.1 m^2       CV ECHO ARIELLE - BZI_METRIC_WEIGHT 94.3 kg       CV ECHO ARIELLE - BZI_METRIC_HEIGHT 152.4 cm      Target HR (85%) 116 bpm      Max. Pred. HR (100%) 136 bpm      EF(MOD-bp) 74.0 %      LA dimension(2D) 4.7 cm      Echo EF Estimated 75 %     Narrative:      · Estimated left ventricular EF = 75% Estimated left ventricular EF was in   agreement with the calculated left ventricular EF. Left ventricular   systolic function is hyperdynamic (EF > 70%).  · Mild aortic valve stenosis is present.  · Moderate tricuspid valve regurgitation is present.  · Estimated right ventricular systolic pressure from tricuspid   regurgitation is moderately elevated (45-55 mmHg).  · Moderate pulmonary hypertension is  present.  · Left ventricular diastolic dysfunction is noted.             Results for orders placed during the hospital encounter of 04/26/21    Venous w Reflux Lower Extremity - Bilateral CAR    Interpretation Summary  · There is evidence of mild reflux in the right mid femoral vein. There is evidence of severe reflux in the right greater saphenous vein at mid thigh. There is evidence of mild reflux in the right greater saphenous vein at the knee.  · Remaining veins on the right without reflux. The left lower extremity normal without reflux.      Results for orders placed during the hospital encounter of 05/10/21    Adult Transthoracic Echo Complete W/ Cont if Necessary Per Protocol    Interpretation Summary  · Estimated left ventricular EF = 75% Estimated left ventricular EF was in agreement with the calculated left ventricular EF. Left ventricular systolic function is hyperdynamic (EF > 70%).  · Mild aortic valve stenosis is present.  · Moderate tricuspid valve regurgitation is present.  · Estimated right ventricular systolic pressure from tricuspid regurgitation is moderately elevated (45-55 mmHg).  · Moderate pulmonary hypertension is present.  · Left ventricular diastolic dysfunction is noted.      CT Chest Without Contrast Diagnostic    Result Date: 5/12/2021   1. FINDINGS compatible with the appearance of multifocal pneumonia, greatest in the bilateral lower lobes. 2. Chronic areas of scarring or fibrosis in both lungs. 3. Suspected mild background interstitial edema. Small layering bilateral pleural effusions, right greater than left. 4. Cirrhotic liver morphology. Correlate with known history. 5.  Splenomegaly, increased since 2018. 6. Multiple chronic thoracic and lumbar compression fractures with posterior spinal fusion in the thoracolumbar junction.    Electronically Signed By-Nitza Ferreira MD On:5/12/2021 9:51 AM This report was finalized on [id number] by  Nitza Ferreira MD.    XR Chest 1  View    Result Date: 5/11/2021  1. Stable right mid-lower lung opacity which may be due to pneumonia. 2. Slight increase in left basal opacity is consistent with mild atelectasis or pneumonia superimposed on chronic change. 3. Cardiomegaly.  Electronically Signed By-Val Dickerson MD On:5/11/2021 9:33 PM This report was finalized on [id number] by  Val Dickerson MD.    XR Chest 1 View    Result Date: 5/10/2021   1. New right basilar opacity may represent developing pneumonia. 2. Chronic scarring in the left perihilar region and left lower lobe. 3. Stable cardiomegaly.  Electronically Signed By-Nitza Ferreira MD On:5/10/2021 12:18 PM This report was finalized on [id number] by  Nitza Ferreira MD.          Xrays, labs reviewed personally by physician.    Medication Review:   I have reviewed the patient's current medication list      Scheduled Meds  allopurinol, 100 mg, Oral, Daily  ARIPiprazole, 2 mg, Oral, Nightly  baclofen, 5 mg, Oral, BID  budesonide, 0.5 mg, Nebulization, BID - RT  carbidopa-levodopa, 2 tablet, Oral, TID  cefTAZidime, 2 g, Intravenous, Q8H  cetirizine, 5 mg, Oral, Daily  Diclofenac Sodium, 4 g, Topical, BID  doxycycline, 100 mg, Oral, Q12H  DULoxetine, 60 mg, Oral, Daily  fenofibrate, 145 mg, Oral, Daily  ipratropium-albuterol, 3 mL, Nebulization, 4x Daily - RT  levothyroxine, 137 mcg, Oral, Daily  lubiprostone, 24 mcg, Oral, BID With Meals  magnesium oxide, 400 mg, Oral, Daily  melatonin, 5 mg, Oral, Nightly  pantoprazole, 40 mg, Oral, QAM  pregabalin, 50 mg, Oral, Nightly  rOPINIRole, 3 mg, Oral, TID  senna, 1 tablet, Oral, Nightly  senna-docusate sodium, 2 tablet, Oral, BID  sodium chloride, 10 mL, Intravenous, Q12H  sodium chloride, 10 mL, Intravenous, Q12H  sucralfate, 1 g, Oral, 4x Daily AC & at Bedtime        Meds Infusions  sodium chloride, 100 mL/hr, Last Rate: Stopped (05/11/21 2130)  sodium chloride, 9 mL/hr        Meds PRN  senna-docusate sodium **AND** polyethylene glycol **AND**  bisacodyl **AND** bisacodyl  •  HYDROcodone-acetaminophen  •  ipratropium-albuterol  •  ondansetron **OR** ondansetron  •  simethicone  •  [COMPLETED] Insert peripheral IV **AND** sodium chloride  •  sodium chloride  •  sodium chloride  •  sodium chloride        Assessment/Plan   Assessment/Plan     Active Hospital Problems    Diagnosis  POA   • Right lower lobe pneumonia [J18.9]  Yes     Priority: High  Continue doxycycline.  Follow ID recommendations.     • Acute blood loss anemia [D62]  Yes     Priority: High  Monitor H&H.       • Gastrointestinal hemorrhage with melena [K92.1]  Yes     Priority: High  Follow GI recommendations.     • Hyperlipidemia [E78.5]  Yes     Priority: High  Treat with Lipitor.     • Hypothyroidism [E03.9]  Yes     Priority: Low  Treat with Synthroid.     • Severe anemia [D64.9]  Yes      Resolved Hospital Problems   No resolved problems to display.       MEDICAL DECISION MAKING COMPLEXITY BY PROBLEM:   Resume appropriate patient's home medications for all other chronic medical conditions.  Continue the present level of care.  Patient and family agreed with the plan of care.      VTE Prophylaxis -   Mechanical Order History:      Ordered        05/10/21 1809  Place Sequential Compression Device  Once         05/10/21 1809  Maintain Sequential Compression Device  Continuous                 Pharmalogical Order History:     None            Code Status -   Code Status and Medical Interventions:   Ordered at: 05/10/21 1401     Level Of Support Discussed With:    Patient     Code Status:    CPR     Medical Interventions (Level of Support Prior to Arrest):    Full       This patient has been examined wearing appropriate Personal Protective Equipment and discussed with hospital infection control department, [facility] department, infectious disease specialist and pulmonologist. 05/16/21        Discharge Planning  Patient may be discharged in the a.m.        Electronically signed by Larry MILLIGAN  MD [place], 05/16/21, 10:39 EDT.  [facility] Hospitalist Team

## 2021-05-17 NOTE — CASE MANAGEMENT/SOCIAL WORK
Continued Stay Note  [facility]     Patient Name: Lorenza Owens  MRN: [id number]  Today's Date: 5/17/2021    Admit Date: 5/10/2021    Discharge Plan     Row Name 05/17/21 1409       Plan    Plan  DC Plan: Return to [facility] (from LTC, will return skilled). No precert or PASRR required.    Plan Comments  Discussed with [facility] liaison that they are able to accept and accommodate the IV antibiotics. Updated them that dc transportation is scheduled to arrive at 1630. Updated RN to call report to nurse Sofi Bai ([phone]).    Final Discharge Disposition Code  03 - skilled nursing facility (SNF)    Final Note  [facility], from LTC originally but returning skilled.        Expected Discharge Date and Time     Expected Discharge Date Expected Discharge Time    May 17, 2021         Phone communication or documentation only - no physical contact with patient or family.    Marii Duron RN     Office Phone: [phone]  Office Cell: [phone]

## 2021-05-17 NOTE — PLAN OF CARE
Goal Outcome Evaluation:    Patient stable. Patient v/s are stable. Patient remains on 3l/NC. Patient to be discharged to [facility] today. Patient complained of pain in the back of her left leg; Hydrocodone 7.5mg po was given. Patient constantly hits call light. Patient rested off and on during my shift. Will continue to monitor.    Problem: Adult Inpatient Plan of Care  Goal: Absence of Hospital-Acquired Illness or Injury  Intervention: Identify and Manage Fall Risk  Intervention: Prevent Skin Injury  Intervention: Prevent Infection  Goal: Optimal Comfort and Wellbeing  Intervention: Provide Person-Centered Care

## 2021-05-18 NOTE — PROGRESS NOTES
"PULMONARY CRITICAL CARE Progress  NOTE      PATIENT IDENTIFICATION:  Name: Lorenza Owens  MRN: [id number]  :  1935     Age: 86 y.o.  Sex: female    DATE OF Note:  2021   Referring Physician: Larry Junior MD                  Subjective:   Feeling better, no SOB, still on O2  No chest or abd pain  No new issues     Objective:  tMax 24 hrs: Temp (24hrs), Av.1 °F (36.7 °C), Min:97.9 °F (36.6 °C), Max:98.4 °F (36.9 °C)      Vitals Ranges:   Temp:  [97.9 °F (36.6 °C)-98.4 °F (36.9 °C)] 98.4 °F (36.9 °C)  Heart Rate:  [82-89] 89  Resp:  [16-22] 20  BP: (128-155)/(58-62) 140/60    Intake and Output Last 3 Shifts:   I/O last 3 completed shifts:  In: -   Out: 1600 [Urine:1600]    Exam:  /60   Pulse 89   Temp 98.4 °F (36.9 °C) (Oral)   Resp 20   Ht 152.4 cm (60\")   Wt 94.4 kg (208 lb 1.8 oz) Comment: last weight was on 5/15  SpO2 93%   BMI 40.64 kg/m²     General Appearance: Alert    HEENT:  Normocephalic, without obvious abnormality, Conjunctiva/corneas clear,.  Normal external ear canals, Nares normal, no drainage     Neck:  Supple, symmetrical, trachea midline. No JVD.  Lungs /Chest wall:   Bilateral basal rhonchi, respirations unlabored symmetrical wall movement.     Heart:  Regular rate and rhythm, systolic murmur PMI left sternal border  Abdomen: Soft, non-tender, no masses, no organomegaly.    Extremities: Trace edema no clubbing or Cyanosis        Medications:  No current facility-administered medications for this encounter.    Current Outpatient Medications:   •  allopurinol (ZYLOPRIM) 100 MG tablet, Take 100 mg by mouth Daily., Disp: , Rfl:   •  ARIPiprazole (ABILIFY) 2 MG tablet, Take 2 mg by mouth Every Night., Disp: , Rfl:   •  baclofen (LIORESAL) 10 MG tablet, Take 5 mg by mouth 2 (Two) Times a Day., Disp: , Rfl:   •  carbidopa-levodopa (SINEMET)  MG per tablet, Take 2 tablets by mouth 3 (Three) Times a Day., Disp: , Rfl:   •  cetirizine (zyrTEC) 10 MG tablet, Take 10 " mg by mouth Daily., Disp: , Rfl:   •  Cranberry 500 MG tablet, Take 500 mg by mouth 2 (two) times a day., Disp: , Rfl:   •  Diclofenac Sodium (VOLTAREN) 1 % gel gel, Apply 4 g topically to the appropriate area as directed 2 (Two) Times a Day., Disp: , Rfl:   •  DULoxetine HCl (CYMBALTA PO), Take 60 mg by mouth Daily., Disp: , Rfl:   •  esomeprazole (nexIUM) 40 MG capsule, Take 40 mg by mouth 2 (two) times a day., Disp: , Rfl:   •  fenofibrate (TRICOR) 145 MG tablet, Take 145 mg by mouth Daily., Disp: , Rfl:   •  fluticasone (FLONASE) 50 MCG/ACT nasal spray, 1 spray into the nostril(s) as directed by provider Daily., Disp: , Rfl:   •  furosemide (LASIX) 20 MG tablet, Take 20 mg by mouth Daily., Disp: , Rfl:   •  HYDROcodone-acetaminophen (NORCO) 7.5-325 MG per tablet, Take 1 tablet by mouth Every 6 (Six) Hours As Needed for Moderate Pain ., Disp: , Rfl:   •  ibuprofen (ADVIL,MOTRIN) 400 MG tablet, Take 400 mg by mouth 2 (Two) Times a Day As Needed for Mild Pain ., Disp: , Rfl:   •  ipratropium-albuterol (DUO-NEB) 0.5-2.5 mg/3 ml nebulizer, Take 3 mL by nebulization Every 6 (Six) Hours As Needed for Wheezing., Disp: , Rfl:   •  levothyroxine (SYNTHROID, LEVOTHROID) 137 MCG tablet, Take 137 mcg by mouth Daily., Disp: , Rfl:   •  lubiprostone (AMITIZA) 24 MCG capsule, Take 24 mcg by mouth 2 (Two) Times a Day With Meals., Disp: , Rfl:   •  magnesium oxide (MAGOX) 400 (241.3 Mg) MG tablet tablet, Take 400 mg by mouth Daily., Disp: , Rfl:   •  melatonin 5 MG tablet tablet, Take 5 mg by mouth Every Night., Disp: , Rfl:   •  polyethylene glycol (MIRALAX) packet, Take 17 g by mouth Daily., Disp: , Rfl:   •  potassium chloride (K-DUR,KLOR-CON) 20 MEQ CR tablet, Take 20 mEq by mouth Daily., Disp: , Rfl:   •  pregabalin (LYRICA) 50 MG capsule, Take 50 mg by mouth Every Night., Disp: , Rfl:   •  rOPINIRole (REQUIP) 3 MG tablet, Take 3 mg by mouth 3 (Three) Times a Day., Disp: , Rfl:   •  senna (SENOKOT) 8.6 MG tablet tablet, Take  1 tablet by mouth Every Night., Disp: , Rfl:   •  vitamin C (ASCORBIC ACID) 500 MG tablet, Take 500 mg by mouth Daily., Disp: , Rfl:   •  vitamin D (ERGOCALCIFEROL) 65549 units capsule capsule, Take 50,000 Units by mouth 1 (One) Time Per Week. Monday, Disp: , Rfl:   •  acetaminophen (TYLENOL) 325 MG tablet, Take 650 mg by mouth Every 8 (Eight) Hours As Needed for Mild Pain ., Disp: , Rfl:   •  albuterol sulfate  (90 Base) MCG/ACT inhaler, Inhale 2 puffs Every 4 (Four) Hours As Needed for Wheezing for up to 30 days., Disp: 18 g, Rfl: 1  •  cefTAZidime 2 g in sodium chloride 0.9 % 100 mL IVPB, Infuse 2 g into a venous catheter Every 8 (Eight) Hours for 9 doses. Indications: Pneumonia, Infection of the Skin and/or Soft Tissue, Disp: , Rfl:   •  cyanocobalamin 1000 MCG/ML injection, Inject 1,000 mcg into the appropriate muscle as directed by prescriber Every 28 (Twenty-Eight) Days. 11th each month, Disp: , Rfl:   •  doxycycline (ADOXA) 100 MG tablet, Take 1 tablet by mouth Every 12 (Twelve) Hours for 8 doses. Indications: Pneumonia, Disp: 8 tablet, Rfl: 0  •  fluticasone-salmeterol (Advair Diskus) 250-50 MCG/DOSE DISKUS, Inhale 1 puff 2 (Two) Times a Day for 30 days., Disp: 60 each, Rfl: 1  •  guaiFENesin (ROBITUSSIN) 100 MG/5ML syrup, Take 600 mg by mouth Every 6 (Six) Hours As Needed for Cough., Disp: , Rfl:   •  ipratropium (Atrovent HFA) 17 MCG/ACT inhaler, Inhale 2 puffs Every 4 (Four) Hours As Needed for Wheezing for up to 30 days., Disp: 12.9 g, Rfl: 11  •  simethicone (MYLICON) 80 MG chewable tablet, Chew 80 mg Every 6 (Six) Hours As Needed for Flatulence., Disp: , Rfl:     Data Review:  All labs (24hrs):   No results found for this or any previous visit (from the past 24 hour(s)).     Imaging:  Adult Transthoracic Echo Complete W/ Cont if Necessary Per Protocol  · Estimated left ventricular EF = 75% Estimated left ventricular EF was in   agreement with the calculated left ventricular EF. Left ventricular    systolic function is hyperdynamic (EF > 70%).  · Mild aortic valve stenosis is present.  · Moderate tricuspid valve regurgitation is present.  · Estimated right ventricular systolic pressure from tricuspid   regurgitation is moderately elevated (45-55 mmHg).  · Moderate pulmonary hypertension is present.  · Left ventricular diastolic dysfunction is noted.          ASSESSMENT:  Acute hypoxic respiratory failure  Multifocal pneumonia  CLAUDIA  Right pleural effusion  GI Bleed   Anemia  GERD  Fibromyalgia  Hyperlipidemia  Hypothyroidism  PVD  Polyarthritis        PLAN:  Needs rehab at discharge   Antibiotics Doxy/ceftazidime   Bronchodilator  Inhaled corticosteroids  O2 support as needed   Incentive spirometer  Monitor H & H, transfuse less that 7.0  Electrolytes/ glycemic control  DVT and GI prophylaxis.       5/17/2021  22:57 EDT

## 2021-05-27 ENCOUNTER — OFFICE VISIT (OUTPATIENT)
Dept: WOUND CARE | Facility: HOSPITAL | Age: 86
End: 2021-05-27

## 2021-06-07 ENCOUNTER — HOSPITAL ENCOUNTER (OUTPATIENT)
Dept: CARDIOLOGY | Facility: HOSPITAL | Age: 86
Discharge: HOME | End: 2021-06-07
Admitting: SURGERY

## 2021-06-07 DIAGNOSIS — L97.811 NON-PRESSURE CHRONIC ULCER OF OTHER PART OF RIGHT LOWER LEG LIMITED TO BREAKDOWN OF SKIN (HCC): ICD-10-CM

## 2021-06-07 LAB
BH CV LOWER ARTERIAL LEFT ABI RATIO: 1.04
BH CV LOWER ARTERIAL LEFT DORSALIS PEDIS SYS MAX: 154 MMHG
BH CV LOWER ARTERIAL LEFT GREAT TOE SYS MAX: 136 MMHG
BH CV LOWER ARTERIAL LEFT TBI RATIO: 0.92
BH CV LOWER ARTERIAL RIGHT ABI RATIO: 1.09
BH CV LOWER ARTERIAL RIGHT DORSALIS PEDIS SYS MAX: 162 MMHG
BH CV LOWER ARTERIAL RIGHT GREAT TOE SYS MAX: 122 MMHG
BH CV LOWER ARTERIAL RIGHT TBI RATIO: 0.82
MAXIMAL PREDICTED HEART RATE: 136 BPM
STRESS TARGET HR: 116 BPM
UPPER ARTERIAL LEFT ARM BRACHIAL SYS MAX: 148 MMHG
UPPER ARTERIAL RIGHT ARM BRACHIAL SYS MAX: 142 MMHG

## 2021-06-07 PROCEDURE — 93922 UPR/L XTREMITY ART 2 LEVELS: CPT

## 2021-06-10 ENCOUNTER — OFFICE VISIT (OUTPATIENT)
Dept: WOUND CARE | Facility: HOSPITAL | Age: 86
End: 2021-06-10

## 2021-06-24 ENCOUNTER — OFFICE VISIT (OUTPATIENT)
Dept: WOUND CARE | Facility: HOSPITAL | Age: 86
End: 2021-06-24

## 2021-07-08 ENCOUNTER — OFFICE VISIT (OUTPATIENT)
Dept: WOUND CARE | Facility: HOSPITAL | Age: 86
End: 2021-07-08

## 2021-07-08 ENCOUNTER — APPOINTMENT (OUTPATIENT)
Dept: WOUND CARE | Facility: HOSPITAL | Age: 86
End: 2021-07-08

## 2021-07-15 ENCOUNTER — OFFICE VISIT (OUTPATIENT)
Dept: WOUND CARE | Facility: HOSPITAL | Age: 86
End: 2021-07-15

## 2021-07-15 ENCOUNTER — LAB REQUISITION (OUTPATIENT)
Dept: LAB | Facility: HOSPITAL | Age: 86
End: 2021-07-15

## 2021-07-15 DIAGNOSIS — L97.812 NON-PRESSURE CHRONIC ULCER OF OTHER PART OF RIGHT LOWER LEG WITH FAT LAYER EXPOSED (HCC): ICD-10-CM

## 2021-07-15 PROCEDURE — 87077 CULTURE AEROBIC IDENTIFY: CPT | Performed by: SURGERY

## 2021-07-15 PROCEDURE — 87205 SMEAR GRAM STAIN: CPT | Performed by: SURGERY

## 2021-07-15 PROCEDURE — 87070 CULTURE OTHR SPECIMN AEROBIC: CPT | Performed by: SURGERY

## 2021-07-15 PROCEDURE — 87186 SC STD MICRODIL/AGAR DIL: CPT | Performed by: SURGERY

## 2021-07-19 LAB
BACTERIA SPEC AEROBE CULT: ABNORMAL
GRAM STN SPEC: ABNORMAL

## 2021-07-22 ENCOUNTER — OFFICE VISIT (OUTPATIENT)
Dept: WOUND CARE | Facility: HOSPITAL | Age: 86
End: 2021-07-22

## 2021-07-22 PROCEDURE — G0463 HOSPITAL OUTPT CLINIC VISIT: HCPCS

## 2021-07-27 ENCOUNTER — HOSPITAL ENCOUNTER (OUTPATIENT)
Facility: HOSPITAL | Age: 86
Discharge: HOME | End: 2021-07-29
Attending: EMERGENCY MEDICINE | Admitting: INTERNAL MEDICINE

## 2021-07-27 DIAGNOSIS — D64.9 ANEMIA, UNSPECIFIED TYPE: Primary | ICD-10-CM

## 2021-07-27 DIAGNOSIS — K31.84 GASTROPARESIS: ICD-10-CM

## 2021-07-27 DIAGNOSIS — D62 ACUTE BLOOD LOSS ANEMIA: ICD-10-CM

## 2021-07-27 LAB
ABO GROUP BLD: NORMAL
ANION GAP SERPL CALCULATED.3IONS-SCNC: 5 MMOL/L (ref 5–15)
APTT PPP: 29.2 SECONDS (ref 24–31)
BASOPHILS # BLD AUTO: 0 10*3/MM3 (ref 0–0.2)
BASOPHILS NFR BLD AUTO: 0.8 % (ref 0–1.5)
BLD GP AB SCN SERPL QL: NEGATIVE
BUN SERPL-MCNC: 20 MG/DL (ref 8–23)
BUN/CREAT SERPL: 16.1 (ref 7–25)
CALCIUM SPEC-SCNC: 8.2 MG/DL (ref 8.6–10.5)
CHLORIDE SERPL-SCNC: 102 MMOL/L (ref 98–107)
CO2 SERPL-SCNC: 29 MMOL/L (ref 22–29)
CREAT SERPL-MCNC: 1.24 MG/DL (ref 0.57–1)
DEPRECATED RDW RBC AUTO: 52.5 FL (ref 37–54)
EOSINOPHIL # BLD AUTO: 0.1 10*3/MM3 (ref 0–0.4)
EOSINOPHIL NFR BLD AUTO: 3.7 % (ref 0.3–6.2)
ERYTHROCYTE [DISTWIDTH] IN BLOOD BY AUTOMATED COUNT: 19 % (ref 12.3–15.4)
GFR SERPL CREATININE-BSD FRML MDRD: 41 ML/MIN/1.73
GLUCOSE SERPL-MCNC: 101 MG/DL (ref 65–99)
HCT VFR BLD AUTO: 21.3 % (ref 34–46.6)
HCT VFR BLD AUTO: 23.8 % (ref 34–46.6)
HGB BLD-MCNC: 6.7 G/DL (ref 12–15.9)
HGB BLD-MCNC: 7.6 G/DL (ref 12–15.9)
INR PPP: 1.16 (ref 0.93–1.1)
LYMPHOCYTES # BLD AUTO: 0.5 10*3/MM3 (ref 0.7–3.1)
LYMPHOCYTES NFR BLD AUTO: 18.1 % (ref 19.6–45.3)
MCH RBC QN AUTO: 24.8 PG (ref 26.6–33)
MCHC RBC AUTO-ENTMCNC: 31.7 G/DL (ref 31.5–35.7)
MCV RBC AUTO: 78.2 FL (ref 79–97)
MONOCYTES # BLD AUTO: 0.4 10*3/MM3 (ref 0.1–0.9)
MONOCYTES NFR BLD AUTO: 12.2 % (ref 5–12)
NEUTROPHILS NFR BLD AUTO: 1.9 10*3/MM3 (ref 1.7–7)
NEUTROPHILS NFR BLD AUTO: 65.2 % (ref 42.7–76)
NRBC BLD AUTO-RTO: 0.1 /100 WBC (ref 0–0.2)
PLATELET # BLD AUTO: 166 10*3/MM3 (ref 140–450)
PMV BLD AUTO: 6.7 FL (ref 6–12)
POTASSIUM SERPL-SCNC: 4.7 MMOL/L (ref 3.5–5.2)
PROTHROMBIN TIME: 12.7 SECONDS (ref 9.6–11.7)
RBC # BLD AUTO: 2.72 10*6/MM3 (ref 3.77–5.28)
RH BLD: POSITIVE
SARS-COV-2 RNA PNL SPEC NAA+PROBE: NOT DETECTED
SODIUM SERPL-SCNC: 136 MMOL/L (ref 136–145)
T&S EXPIRATION DATE: NORMAL
WBC # BLD AUTO: 3 10*3/MM3 (ref 3.4–10.8)

## 2021-07-27 PROCEDURE — 85025 COMPLETE CBC W/AUTO DIFF WBC: CPT | Performed by: PHYSICIAN ASSISTANT

## 2021-07-27 PROCEDURE — U0003 INFECTIOUS AGENT DETECTION BY NUCLEIC ACID (DNA OR RNA); SEVERE ACUTE RESPIRATORY SYNDROME CORONAVIRUS 2 (SARS-COV-2) (CORONAVIRUS DISEASE [COVID-19]), AMPLIFIED PROBE TECHNIQUE, MAKING USE OF HIGH THROUGHPUT TECHNOLOGIES AS DESCRIBED BY CMS-2020-01-R: HCPCS | Performed by: EMERGENCY MEDICINE

## 2021-07-27 PROCEDURE — 86900 BLOOD TYPING SEROLOGIC ABO: CPT | Performed by: PHYSICIAN ASSISTANT

## 2021-07-27 PROCEDURE — G0378 HOSPITAL OBSERVATION PER HR: HCPCS

## 2021-07-27 PROCEDURE — C9803 HOPD COVID-19 SPEC COLLECT: HCPCS

## 2021-07-27 PROCEDURE — 85018 HEMOGLOBIN: CPT | Performed by: EMERGENCY MEDICINE

## 2021-07-27 PROCEDURE — 86850 RBC ANTIBODY SCREEN: CPT | Performed by: PHYSICIAN ASSISTANT

## 2021-07-27 PROCEDURE — 99284 EMERGENCY DEPT VISIT MOD MDM: CPT

## 2021-07-27 PROCEDURE — P9016 RBC LEUKOCYTES REDUCED: HCPCS

## 2021-07-27 PROCEDURE — 36430 TRANSFUSION BLD/BLD COMPNT: CPT

## 2021-07-27 PROCEDURE — 85610 PROTHROMBIN TIME: CPT | Performed by: PHYSICIAN ASSISTANT

## 2021-07-27 PROCEDURE — 85014 HEMATOCRIT: CPT | Performed by: EMERGENCY MEDICINE

## 2021-07-27 PROCEDURE — 85730 THROMBOPLASTIN TIME PARTIAL: CPT | Performed by: PHYSICIAN ASSISTANT

## 2021-07-27 PROCEDURE — 86923 COMPATIBILITY TEST ELECTRIC: CPT

## 2021-07-27 PROCEDURE — U0005 INFEC AGEN DETEC AMPLI PROBE: HCPCS | Performed by: EMERGENCY MEDICINE

## 2021-07-27 PROCEDURE — 86900 BLOOD TYPING SEROLOGIC ABO: CPT

## 2021-07-27 PROCEDURE — 80048 BASIC METABOLIC PNL TOTAL CA: CPT | Performed by: PHYSICIAN ASSISTANT

## 2021-07-27 PROCEDURE — 36415 COLL VENOUS BLD VENIPUNCTURE: CPT | Performed by: PHYSICIAN ASSISTANT

## 2021-07-27 PROCEDURE — 86901 BLOOD TYPING SEROLOGIC RH(D): CPT | Performed by: PHYSICIAN ASSISTANT

## 2021-07-27 RX ORDER — IPRATROPIUM BROMIDE AND ALBUTEROL SULFATE 2.5; .5 MG/3ML; MG/3ML
3 SOLUTION RESPIRATORY (INHALATION) EVERY 6 HOURS PRN
Status: DISCONTINUED | OUTPATIENT
Start: 2021-07-27 | End: 2021-07-29 | Stop reason: HOSPADM

## 2021-07-27 RX ORDER — PANTOPRAZOLE SODIUM 40 MG/1
40 TABLET, DELAYED RELEASE ORAL EVERY MORNING
Status: DISCONTINUED | OUTPATIENT
Start: 2021-07-28 | End: 2021-07-29 | Stop reason: HOSPADM

## 2021-07-27 RX ORDER — NYSTATIN 100000 [USP'U]/G
1 POWDER TOPICAL AS NEEDED
COMMUNITY

## 2021-07-27 RX ORDER — ALBUTEROL SULFATE 90 UG/1
2 AEROSOL, METERED RESPIRATORY (INHALATION) EVERY 4 HOURS PRN
Status: DISCONTINUED | OUTPATIENT
Start: 2021-07-27 | End: 2021-07-27

## 2021-07-27 RX ORDER — BUDESONIDE AND FORMOTEROL FUMARATE DIHYDRATE 160; 4.5 UG/1; UG/1
2 AEROSOL RESPIRATORY (INHALATION)
Status: DISCONTINUED | OUTPATIENT
Start: 2021-07-27 | End: 2021-07-29 | Stop reason: HOSPADM

## 2021-07-27 RX ORDER — ZINC OXIDE 20 %
OINTMENT (GRAM) TOPICAL EVERY 12 HOURS PRN
Status: DISCONTINUED | OUTPATIENT
Start: 2021-07-27 | End: 2021-07-29 | Stop reason: HOSPADM

## 2021-07-27 RX ORDER — RAMELTEON 8 MG/1
8 TABLET ORAL NIGHTLY
Status: ON HOLD | COMMUNITY
End: 2022-10-23

## 2021-07-27 RX ORDER — ARIPIPRAZOLE 2 MG/1
2 TABLET ORAL NIGHTLY
Status: DISCONTINUED | OUTPATIENT
Start: 2021-07-27 | End: 2021-07-29 | Stop reason: HOSPADM

## 2021-07-27 RX ORDER — FENOFIBRATE 145 MG/1
145 TABLET, COATED ORAL DAILY
Status: DISCONTINUED | OUTPATIENT
Start: 2021-07-28 | End: 2021-07-29 | Stop reason: HOSPADM

## 2021-07-27 RX ORDER — ALLOPURINOL 100 MG/1
100 TABLET ORAL DAILY
Status: DISCONTINUED | OUTPATIENT
Start: 2021-07-28 | End: 2021-07-29 | Stop reason: HOSPADM

## 2021-07-27 RX ORDER — CIPROFLOXACIN 500 MG/1
500 TABLET, FILM COATED ORAL EVERY 12 HOURS SCHEDULED
Status: DISCONTINUED | OUTPATIENT
Start: 2021-07-27 | End: 2021-07-29 | Stop reason: HOSPADM

## 2021-07-27 RX ORDER — SIMETHICONE 80 MG
80 TABLET,CHEWABLE ORAL EVERY 6 HOURS PRN
Status: DISCONTINUED | OUTPATIENT
Start: 2021-07-27 | End: 2021-07-29 | Stop reason: HOSPADM

## 2021-07-27 RX ORDER — SODIUM CHLORIDE 0.9 % (FLUSH) 0.9 %
10 SYRINGE (ML) INJECTION AS NEEDED
Status: DISCONTINUED | OUTPATIENT
Start: 2021-07-27 | End: 2021-07-29 | Stop reason: HOSPADM

## 2021-07-27 RX ORDER — ALBUTEROL SULFATE 90 UG/1
2 AEROSOL, METERED RESPIRATORY (INHALATION) EVERY 4 HOURS PRN
COMMUNITY

## 2021-07-27 RX ORDER — NYSTATIN 100000 [USP'U]/G
1 POWDER TOPICAL AS NEEDED
Status: DISCONTINUED | OUTPATIENT
Start: 2021-07-27 | End: 2021-07-29 | Stop reason: HOSPADM

## 2021-07-27 RX ORDER — BACLOFEN 10 MG/1
5 TABLET ORAL 2 TIMES DAILY
Status: DISCONTINUED | OUTPATIENT
Start: 2021-07-27 | End: 2021-07-29 | Stop reason: HOSPADM

## 2021-07-27 RX ORDER — ACETAMINOPHEN 325 MG/1
650 TABLET ORAL EVERY 8 HOURS PRN
Status: DISCONTINUED | OUTPATIENT
Start: 2021-07-27 | End: 2021-07-27 | Stop reason: SDUPTHER

## 2021-07-27 RX ORDER — SODIUM CHLORIDE 0.9 % (FLUSH) 0.9 %
10 SYRINGE (ML) INJECTION EVERY 12 HOURS SCHEDULED
Status: DISCONTINUED | OUTPATIENT
Start: 2021-07-27 | End: 2021-07-29 | Stop reason: HOSPADM

## 2021-07-27 RX ORDER — LUBIPROSTONE 24 UG/1
24 CAPSULE ORAL 2 TIMES DAILY WITH MEALS
Status: DISCONTINUED | OUTPATIENT
Start: 2021-07-27 | End: 2021-07-29 | Stop reason: HOSPADM

## 2021-07-27 RX ORDER — ZINC OXIDE
1 OINTMENT (GRAM) TOPICAL EVERY 12 HOURS PRN
COMMUNITY

## 2021-07-27 RX ORDER — CHOLECALCIFEROL (VITAMIN D3) 125 MCG
5 CAPSULE ORAL NIGHTLY PRN
Status: DISCONTINUED | OUTPATIENT
Start: 2021-07-27 | End: 2021-07-29 | Stop reason: HOSPADM

## 2021-07-27 RX ORDER — FENTANYL 25 UG/H
1 PATCH TRANSDERMAL
COMMUNITY

## 2021-07-27 RX ORDER — SENNA PLUS 8.6 MG/1
1 TABLET ORAL NIGHTLY
Status: DISCONTINUED | OUTPATIENT
Start: 2021-07-27 | End: 2021-07-29 | Stop reason: HOSPADM

## 2021-07-27 RX ORDER — FENTANYL 12 UG/H
1 PATCH TRANSDERMAL
Status: DISCONTINUED | OUTPATIENT
Start: 2021-07-30 | End: 2021-07-29 | Stop reason: HOSPADM

## 2021-07-27 RX ORDER — ERGOCALCIFEROL 1.25 MG/1
50000 CAPSULE ORAL WEEKLY
Status: DISCONTINUED | OUTPATIENT
Start: 2021-07-27 | End: 2021-07-29 | Stop reason: HOSPADM

## 2021-07-27 RX ORDER — CIPROFLOXACIN 500 MG/1
500 TABLET, FILM COATED ORAL 2 TIMES DAILY
COMMUNITY
Start: 2021-07-19 | End: 2021-08-02

## 2021-07-27 RX ORDER — ONDANSETRON 2 MG/ML
4 INJECTION INTRAMUSCULAR; INTRAVENOUS EVERY 6 HOURS PRN
Status: DISCONTINUED | OUTPATIENT
Start: 2021-07-27 | End: 2021-07-29 | Stop reason: HOSPADM

## 2021-07-27 RX ORDER — ASCORBIC ACID 500 MG
500 TABLET ORAL DAILY
Status: DISCONTINUED | OUTPATIENT
Start: 2021-07-28 | End: 2021-07-29 | Stop reason: HOSPADM

## 2021-07-27 RX ORDER — ZOLPIDEM TARTRATE 5 MG/1
5 TABLET ORAL NIGHTLY
Status: DISCONTINUED | OUTPATIENT
Start: 2021-07-27 | End: 2021-07-29 | Stop reason: HOSPADM

## 2021-07-27 RX ORDER — ACETAMINOPHEN 325 MG/1
650 TABLET ORAL EVERY 4 HOURS PRN
Status: DISCONTINUED | OUTPATIENT
Start: 2021-07-27 | End: 2021-07-29 | Stop reason: HOSPADM

## 2021-07-27 RX ORDER — PREGABALIN 25 MG/1
50 CAPSULE ORAL 2 TIMES DAILY
Status: DISCONTINUED | OUTPATIENT
Start: 2021-07-27 | End: 2021-07-29 | Stop reason: HOSPADM

## 2021-07-27 RX ORDER — POTASSIUM CHLORIDE 20 MEQ/1
20 TABLET, EXTENDED RELEASE ORAL DAILY
Status: DISCONTINUED | OUTPATIENT
Start: 2021-07-28 | End: 2021-07-29 | Stop reason: HOSPADM

## 2021-07-27 RX ORDER — HYDROCODONE BITARTRATE AND ACETAMINOPHEN 10; 325 MG/1; MG/1
1 TABLET ORAL EVERY 6 HOURS
Status: DISCONTINUED | OUTPATIENT
Start: 2021-07-27 | End: 2021-07-29 | Stop reason: HOSPADM

## 2021-07-27 RX ORDER — POLYETHYLENE GLYCOL 3350 17 G/17G
17 POWDER, FOR SOLUTION ORAL EVERY MORNING
Status: DISCONTINUED | OUTPATIENT
Start: 2021-07-28 | End: 2021-07-29 | Stop reason: HOSPADM

## 2021-07-27 RX ORDER — ROPINIROLE 1 MG/1
3 TABLET, FILM COATED ORAL 3 TIMES DAILY
Status: DISCONTINUED | OUTPATIENT
Start: 2021-07-27 | End: 2021-07-29 | Stop reason: HOSPADM

## 2021-07-27 RX ORDER — DULOXETIN HYDROCHLORIDE 30 MG/1
60 CAPSULE, DELAYED RELEASE ORAL NIGHTLY
Status: DISCONTINUED | OUTPATIENT
Start: 2021-07-27 | End: 2021-07-29 | Stop reason: HOSPADM

## 2021-07-27 RX ORDER — FUROSEMIDE 20 MG/1
20 TABLET ORAL DAILY
Status: DISCONTINUED | OUTPATIENT
Start: 2021-07-28 | End: 2021-07-29 | Stop reason: HOSPADM

## 2021-07-27 RX ORDER — CYANOCOBALAMIN 1000 UG/ML
1000 INJECTION, SOLUTION INTRAMUSCULAR; SUBCUTANEOUS
Status: DISCONTINUED | OUTPATIENT
Start: 2021-07-28 | End: 2021-07-29 | Stop reason: HOSPADM

## 2021-07-27 RX ADMIN — Medication 10 ML: at 22:04

## 2021-07-27 RX ADMIN — ARIPIPRAZOLE 2 MG: 2 TABLET ORAL at 22:02

## 2021-07-27 RX ADMIN — DULOXETINE 60 MG: 30 CAPSULE, DELAYED RELEASE ORAL at 22:02

## 2021-07-27 RX ADMIN — PREGABALIN 50 MG: 25 CAPSULE ORAL at 22:02

## 2021-07-27 RX ADMIN — ROPINIROLE HYDROCHLORIDE 3 MG: 1 TABLET, FILM COATED ORAL at 22:02

## 2021-07-27 RX ADMIN — ZOLPIDEM TARTRATE 5 MG: 5 TABLET ORAL at 22:04

## 2021-07-27 RX ADMIN — HYDROCODONE BITARTRATE AND ACETAMINOPHEN 1 TABLET: 10; 325 TABLET ORAL at 22:03

## 2021-07-27 RX ADMIN — MAGNESIUM OXIDE TAB 400 MG (241.3 MG ELEMENTAL MG) 400 MG: 400 (241.3 MG) TAB at 22:03

## 2021-07-27 RX ADMIN — CARBIDOPA AND LEVODOPA 2 TABLET: 25; 100 TABLET ORAL at 22:03

## 2021-07-27 RX ADMIN — BACLOFEN 5 MG: 10 TABLET ORAL at 22:03

## 2021-07-28 ENCOUNTER — ANESTHESIA EVENT (OUTPATIENT)
Dept: GASTROENTEROLOGY | Facility: HOSPITAL | Age: 86
End: 2021-07-28

## 2021-07-28 PROBLEM — L97.912 NON-PRESSURE ULCER OF LOWER EXTREMITY WITH FAT LAYER EXPOSED, RIGHT: Status: ACTIVE | Noted: 2021-07-28

## 2021-07-28 LAB
ANION GAP SERPL CALCULATED.3IONS-SCNC: 6 MMOL/L (ref 5–15)
BASOPHILS # BLD AUTO: 0 10*3/MM3 (ref 0–0.2)
BASOPHILS NFR BLD AUTO: 0.6 % (ref 0–1.5)
BUN SERPL-MCNC: 21 MG/DL (ref 8–23)
BUN/CREAT SERPL: 20 (ref 7–25)
CALCIUM SPEC-SCNC: 8.2 MG/DL (ref 8.6–10.5)
CHLORIDE SERPL-SCNC: 105 MMOL/L (ref 98–107)
CO2 SERPL-SCNC: 27 MMOL/L (ref 22–29)
CREAT SERPL-MCNC: 1.05 MG/DL (ref 0.57–1)
DEPRECATED RDW RBC AUTO: 51.2 FL (ref 37–54)
EOSINOPHIL # BLD AUTO: 0.1 10*3/MM3 (ref 0–0.4)
EOSINOPHIL NFR BLD AUTO: 3.8 % (ref 0.3–6.2)
ERYTHROCYTE [DISTWIDTH] IN BLOOD BY AUTOMATED COUNT: 18.2 % (ref 12.3–15.4)
FOLATE SERPL-MCNC: 11.6 NG/ML (ref 4.78–24.2)
GFR SERPL CREATININE-BSD FRML MDRD: 50 ML/MIN/1.73
GLUCOSE SERPL-MCNC: 82 MG/DL (ref 65–99)
HCT VFR BLD AUTO: 26.2 % (ref 34–46.6)
HCT VFR BLD AUTO: 26.2 % (ref 34–46.6)
HCT VFR BLD AUTO: 27 % (ref 34–46.6)
HEMOCCULT STL QL IA: POSITIVE
HGB BLD-MCNC: 8.5 G/DL (ref 12–15.9)
HGB BLD-MCNC: 8.5 G/DL (ref 12–15.9)
HGB BLD-MCNC: 8.8 G/DL (ref 12–15.9)
INR PPP: 1.03 (ref 0.93–1.1)
IRON 24H UR-MRATE: 87 MCG/DL (ref 37–145)
IRON SATN MFR SERPL: 16 % (ref 20–50)
LYMPHOCYTES # BLD AUTO: 0.7 10*3/MM3 (ref 0.7–3.1)
LYMPHOCYTES NFR BLD AUTO: 23 % (ref 19.6–45.3)
MCH RBC QN AUTO: 25.9 PG (ref 26.6–33)
MCHC RBC AUTO-ENTMCNC: 32.3 G/DL (ref 31.5–35.7)
MCV RBC AUTO: 80.2 FL (ref 79–97)
MONOCYTES # BLD AUTO: 0.3 10*3/MM3 (ref 0.1–0.9)
MONOCYTES NFR BLD AUTO: 10.3 % (ref 5–12)
NEUTROPHILS NFR BLD AUTO: 1.9 10*3/MM3 (ref 1.7–7)
NEUTROPHILS NFR BLD AUTO: 62.3 % (ref 42.7–76)
NRBC BLD AUTO-RTO: 0.1 /100 WBC (ref 0–0.2)
PLATELET # BLD AUTO: 168 10*3/MM3 (ref 140–450)
PMV BLD AUTO: 6.9 FL (ref 6–12)
POTASSIUM SERPL-SCNC: 4.7 MMOL/L (ref 3.5–5.2)
PROTHROMBIN TIME: 11.4 SECONDS (ref 9.6–11.7)
RBC # BLD AUTO: 3.27 10*6/MM3 (ref 3.77–5.28)
RETICS # AUTO: 0.09 10*6/MM3 (ref 0.02–0.13)
RETICS/RBC NFR AUTO: 2.67 % (ref 0.7–1.9)
SODIUM SERPL-SCNC: 138 MMOL/L (ref 136–145)
TIBC SERPL-MCNC: 553 MCG/DL (ref 298–536)
TRANSFERRIN SERPL-MCNC: 371 MG/DL (ref 200–360)
VIT B12 BLD-MCNC: >2000 PG/ML (ref 211–946)
WBC # BLD AUTO: 3.1 10*3/MM3 (ref 3.4–10.8)

## 2021-07-28 PROCEDURE — 84466 ASSAY OF TRANSFERRIN: CPT | Performed by: NURSE PRACTITIONER

## 2021-07-28 PROCEDURE — 85045 AUTOMATED RETICULOCYTE COUNT: CPT | Performed by: INTERNAL MEDICINE

## 2021-07-28 PROCEDURE — 85018 HEMOGLOBIN: CPT | Performed by: PHYSICIAN ASSISTANT

## 2021-07-28 PROCEDURE — 82607 VITAMIN B-12: CPT | Performed by: NURSE PRACTITIONER

## 2021-07-28 PROCEDURE — 85610 PROTHROMBIN TIME: CPT | Performed by: PHYSICIAN ASSISTANT

## 2021-07-28 PROCEDURE — 85014 HEMATOCRIT: CPT | Performed by: PHYSICIAN ASSISTANT

## 2021-07-28 PROCEDURE — 96372 THER/PROPH/DIAG INJ SC/IM: CPT

## 2021-07-28 PROCEDURE — 82746 ASSAY OF FOLIC ACID SERUM: CPT | Performed by: NURSE PRACTITIONER

## 2021-07-28 PROCEDURE — 94799 UNLISTED PULMONARY SVC/PX: CPT

## 2021-07-28 PROCEDURE — 85025 COMPLETE CBC W/AUTO DIFF WBC: CPT | Performed by: PHYSICIAN ASSISTANT

## 2021-07-28 PROCEDURE — G0378 HOSPITAL OBSERVATION PER HR: HCPCS

## 2021-07-28 PROCEDURE — 82274 ASSAY TEST FOR BLOOD FECAL: CPT | Performed by: EMERGENCY MEDICINE

## 2021-07-28 PROCEDURE — 80048 BASIC METABOLIC PNL TOTAL CA: CPT | Performed by: PHYSICIAN ASSISTANT

## 2021-07-28 PROCEDURE — 25010000002 CYANOCOBALAMIN PER 1000 MCG: Performed by: PHYSICIAN ASSISTANT

## 2021-07-28 PROCEDURE — 83540 ASSAY OF IRON: CPT | Performed by: NURSE PRACTITIONER

## 2021-07-28 PROCEDURE — 99212 OFFICE O/P EST SF 10 MIN: CPT | Performed by: NURSE PRACTITIONER

## 2021-07-28 RX ORDER — GUAIFENESIN 200 MG/1
600 TABLET ORAL EVERY 6 HOURS PRN
Status: DISCONTINUED | OUTPATIENT
Start: 2021-07-28 | End: 2021-07-29 | Stop reason: HOSPADM

## 2021-07-28 RX ORDER — FERROUS SULFATE TAB EC 324 MG (65 MG FE EQUIVALENT) 324 (65 FE) MG
324 TABLET DELAYED RESPONSE ORAL
Status: DISCONTINUED | OUTPATIENT
Start: 2021-07-30 | End: 2021-07-29 | Stop reason: HOSPADM

## 2021-07-28 RX ADMIN — ZOLPIDEM TARTRATE 5 MG: 5 TABLET ORAL at 22:50

## 2021-07-28 RX ADMIN — CIPROFLOXACIN 500 MG: 500 TABLET, FILM COATED ORAL at 22:49

## 2021-07-28 RX ADMIN — BUDESONIDE AND FORMOTEROL FUMARATE DIHYDRATE 2 PUFF: 160; 4.5 AEROSOL RESPIRATORY (INHALATION) at 18:22

## 2021-07-28 RX ADMIN — ARIPIPRAZOLE 2 MG: 2 TABLET ORAL at 22:47

## 2021-07-28 RX ADMIN — DICLOFENAC SODIUM 2 G: 10 GEL TOPICAL at 10:23

## 2021-07-28 RX ADMIN — ERGOCALCIFEROL 50000 UNITS: 1.25 CAPSULE ORAL at 00:44

## 2021-07-28 RX ADMIN — LUBIPROSTONE 24 MCG: 24 CAPSULE, GELATIN COATED ORAL at 10:19

## 2021-07-28 RX ADMIN — FUROSEMIDE 20 MG: 20 TABLET ORAL at 10:20

## 2021-07-28 RX ADMIN — ALLOPURINOL 100 MG: 100 TABLET ORAL at 10:20

## 2021-07-28 RX ADMIN — CARBIDOPA AND LEVODOPA 2 TABLET: 25; 100 TABLET ORAL at 22:48

## 2021-07-28 RX ADMIN — OXYCODONE HYDROCHLORIDE AND ACETAMINOPHEN 500 MG: 500 TABLET ORAL at 10:19

## 2021-07-28 RX ADMIN — HYDROCODONE BITARTRATE AND ACETAMINOPHEN 1 TABLET: 10; 325 TABLET ORAL at 22:49

## 2021-07-28 RX ADMIN — BACLOFEN 5 MG: 10 TABLET ORAL at 22:47

## 2021-07-28 RX ADMIN — LUBIPROSTONE 24 MCG: 24 CAPSULE, GELATIN COATED ORAL at 18:19

## 2021-07-28 RX ADMIN — FENOFIBRATE 145 MG: 145 TABLET ORAL at 10:19

## 2021-07-28 RX ADMIN — PANTOPRAZOLE SODIUM 40 MG: 40 TABLET, DELAYED RELEASE ORAL at 06:24

## 2021-07-28 RX ADMIN — LEVOTHYROXINE SODIUM 137 MCG: 0.03 TABLET ORAL at 05:08

## 2021-07-28 RX ADMIN — DULOXETINE 60 MG: 30 CAPSULE, DELAYED RELEASE ORAL at 22:49

## 2021-07-28 RX ADMIN — ROPINIROLE HYDROCHLORIDE 3 MG: 1 TABLET, FILM COATED ORAL at 18:19

## 2021-07-28 RX ADMIN — PREGABALIN 50 MG: 25 CAPSULE ORAL at 10:19

## 2021-07-28 RX ADMIN — ROPINIROLE HYDROCHLORIDE 3 MG: 1 TABLET, FILM COATED ORAL at 22:48

## 2021-07-28 RX ADMIN — PREGABALIN 50 MG: 25 CAPSULE ORAL at 22:50

## 2021-07-28 RX ADMIN — HYDROCODONE BITARTRATE AND ACETAMINOPHEN 1 TABLET: 10; 325 TABLET ORAL at 05:08

## 2021-07-28 RX ADMIN — CIPROFLOXACIN 500 MG: 500 TABLET, FILM COATED ORAL at 10:19

## 2021-07-28 RX ADMIN — POLYETHYLENE GLYCOL 3350 17 G: 17 POWDER, FOR SOLUTION ORAL at 06:24

## 2021-07-28 RX ADMIN — MAGNESIUM OXIDE TAB 400 MG (241.3 MG ELEMENTAL MG) 400 MG: 400 (241.3 MG) TAB at 22:50

## 2021-07-28 RX ADMIN — CYANOCOBALAMIN 1000 MCG: 1000 INJECTION, SOLUTION INTRAMUSCULAR at 10:18

## 2021-07-28 RX ADMIN — Medication 10 ML: at 10:18

## 2021-07-28 RX ADMIN — BACLOFEN 5 MG: 10 TABLET ORAL at 10:20

## 2021-07-28 RX ADMIN — HYDROCODONE BITARTRATE AND ACETAMINOPHEN 1 TABLET: 10; 325 TABLET ORAL at 11:02

## 2021-07-28 RX ADMIN — DICLOFENAC SODIUM 2 G: 10 GEL TOPICAL at 00:41

## 2021-07-28 RX ADMIN — POTASSIUM CHLORIDE 20 MEQ: 1500 TABLET, EXTENDED RELEASE ORAL at 10:19

## 2021-07-28 RX ADMIN — HYDROCODONE BITARTRATE AND ACETAMINOPHEN 1 TABLET: 10; 325 TABLET ORAL at 18:19

## 2021-07-28 RX ADMIN — SENNOSIDES 1 TABLET: 8.6 TABLET, FILM COATED ORAL at 00:42

## 2021-07-28 RX ADMIN — Medication 10 ML: at 22:54

## 2021-07-28 RX ADMIN — ROPINIROLE HYDROCHLORIDE 3 MG: 1 TABLET, FILM COATED ORAL at 10:19

## 2021-07-28 RX ADMIN — CARBIDOPA AND LEVODOPA 2 TABLET: 25; 100 TABLET ORAL at 10:19

## 2021-07-28 RX ADMIN — CARBIDOPA AND LEVODOPA 2 TABLET: 25; 100 TABLET ORAL at 18:19

## 2021-07-28 RX ADMIN — CIPROFLOXACIN 500 MG: 500 TABLET, FILM COATED ORAL at 00:41

## 2021-07-29 ENCOUNTER — ANESTHESIA (OUTPATIENT)
Dept: GASTROENTEROLOGY | Facility: HOSPITAL | Age: 86
End: 2021-07-29

## 2021-07-29 ENCOUNTER — APPOINTMENT (OUTPATIENT)
Dept: WOUND CARE | Facility: HOSPITAL | Age: 86
End: 2021-07-29

## 2021-07-29 VITALS
OXYGEN SATURATION: 93 % | DIASTOLIC BLOOD PRESSURE: 65 MMHG | BODY MASS INDEX: 40.77 KG/M2 | HEIGHT: 60 IN | SYSTOLIC BLOOD PRESSURE: 109 MMHG | WEIGHT: 207.67 LBS | HEART RATE: 78 BPM | TEMPERATURE: 98.1 F | RESPIRATION RATE: 18 BRPM

## 2021-07-29 LAB
ALBUMIN SERPL-MCNC: 3.4 G/DL (ref 3.5–5.2)
ALBUMIN/GLOB SERPL: 1 G/DL
ALP SERPL-CCNC: 66 U/L (ref 39–117)
ALT SERPL W P-5'-P-CCNC: <5 U/L (ref 1–33)
ANION GAP SERPL CALCULATED.3IONS-SCNC: 6 MMOL/L (ref 5–15)
AST SERPL-CCNC: 12 U/L (ref 1–32)
BASOPHILS # BLD AUTO: 0 10*3/MM3 (ref 0–0.2)
BASOPHILS NFR BLD AUTO: 0.5 % (ref 0–1.5)
BH BB BLOOD EXPIRATION DATE: NORMAL
BH BB BLOOD EXPIRATION DATE: NORMAL
BH BB BLOOD TYPE BARCODE: 6200
BH BB BLOOD TYPE BARCODE: 6200
BH BB DISPENSE STATUS: NORMAL
BH BB DISPENSE STATUS: NORMAL
BH BB PRODUCT CODE: NORMAL
BH BB PRODUCT CODE: NORMAL
BH BB UNIT NUMBER: NORMAL
BH BB UNIT NUMBER: NORMAL
BILIRUB SERPL-MCNC: 0.4 MG/DL (ref 0–1.2)
BUN SERPL-MCNC: 24 MG/DL (ref 8–23)
BUN/CREAT SERPL: 21.1 (ref 7–25)
CALCIUM SPEC-SCNC: 8.7 MG/DL (ref 8.6–10.5)
CHLORIDE SERPL-SCNC: 103 MMOL/L (ref 98–107)
CO2 SERPL-SCNC: 28 MMOL/L (ref 22–29)
CREAT SERPL-MCNC: 1.14 MG/DL (ref 0.57–1)
CROSSMATCH INTERPRETATION: NORMAL
CROSSMATCH INTERPRETATION: NORMAL
DEPRECATED RDW RBC AUTO: 52.5 FL (ref 37–54)
EOSINOPHIL # BLD AUTO: 0.1 10*3/MM3 (ref 0–0.4)
EOSINOPHIL NFR BLD AUTO: 3.5 % (ref 0.3–6.2)
ERYTHROCYTE [DISTWIDTH] IN BLOOD BY AUTOMATED COUNT: 18.7 % (ref 12.3–15.4)
GFR SERPL CREATININE-BSD FRML MDRD: 45 ML/MIN/1.73
GLOBULIN UR ELPH-MCNC: 3.5 GM/DL
GLUCOSE SERPL-MCNC: 100 MG/DL (ref 65–99)
HCT VFR BLD AUTO: 26.1 % (ref 34–46.6)
HCT VFR BLD AUTO: 27.6 % (ref 34–46.6)
HGB BLD-MCNC: 8.5 G/DL (ref 12–15.9)
HGB BLD-MCNC: 8.9 G/DL (ref 12–15.9)
INR PPP: 1.14 (ref 0.93–1.1)
LYMPHOCYTES # BLD AUTO: 0.6 10*3/MM3 (ref 0.7–3.1)
LYMPHOCYTES NFR BLD AUTO: 18.3 % (ref 19.6–45.3)
MCH RBC QN AUTO: 25.9 PG (ref 26.6–33)
MCHC RBC AUTO-ENTMCNC: 32.2 G/DL (ref 31.5–35.7)
MCV RBC AUTO: 80.5 FL (ref 79–97)
MONOCYTES # BLD AUTO: 0.3 10*3/MM3 (ref 0.1–0.9)
MONOCYTES NFR BLD AUTO: 9.7 % (ref 5–12)
NEUTROPHILS NFR BLD AUTO: 2.3 10*3/MM3 (ref 1.7–7)
NEUTROPHILS NFR BLD AUTO: 68 % (ref 42.7–76)
NRBC BLD AUTO-RTO: 0.1 /100 WBC (ref 0–0.2)
PLATELET # BLD AUTO: 174 10*3/MM3 (ref 140–450)
PMV BLD AUTO: 6.8 FL (ref 6–12)
POTASSIUM SERPL-SCNC: 4.3 MMOL/L (ref 3.5–5.2)
PROT SERPL-MCNC: 6.9 G/DL (ref 6–8.5)
PROTHROMBIN TIME: 12.5 SECONDS (ref 9.6–11.7)
RBC # BLD AUTO: 3.43 10*6/MM3 (ref 3.77–5.28)
SODIUM SERPL-SCNC: 137 MMOL/L (ref 136–145)
UNIT  ABO: NORMAL
UNIT  ABO: NORMAL
UNIT  RH: NORMAL
UNIT  RH: NORMAL
WBC # BLD AUTO: 3.4 10*3/MM3 (ref 3.4–10.8)

## 2021-07-29 PROCEDURE — 85025 COMPLETE CBC W/AUTO DIFF WBC: CPT | Performed by: NURSE PRACTITIONER

## 2021-07-29 PROCEDURE — G0378 HOSPITAL OBSERVATION PER HR: HCPCS

## 2021-07-29 PROCEDURE — 80053 COMPREHEN METABOLIC PANEL: CPT | Performed by: NURSE PRACTITIONER

## 2021-07-29 PROCEDURE — 94799 UNLISTED PULMONARY SVC/PX: CPT

## 2021-07-29 PROCEDURE — 85018 HEMOGLOBIN: CPT | Performed by: PHYSICIAN ASSISTANT

## 2021-07-29 PROCEDURE — 85610 PROTHROMBIN TIME: CPT | Performed by: NURSE PRACTITIONER

## 2021-07-29 PROCEDURE — 25010000002 PROPOFOL 10 MG/ML EMULSION: Performed by: NURSE ANESTHETIST, CERTIFIED REGISTERED

## 2021-07-29 PROCEDURE — 85014 HEMATOCRIT: CPT | Performed by: PHYSICIAN ASSISTANT

## 2021-07-29 RX ORDER — PROPOFOL 10 MG/ML
VIAL (ML) INTRAVENOUS AS NEEDED
Status: DISCONTINUED | OUTPATIENT
Start: 2021-07-29 | End: 2021-07-29 | Stop reason: SURG

## 2021-07-29 RX ORDER — ONDANSETRON 2 MG/ML
4 INJECTION INTRAMUSCULAR; INTRAVENOUS ONCE AS NEEDED
Status: DISCONTINUED | OUTPATIENT
Start: 2021-07-29 | End: 2021-07-29 | Stop reason: HOSPADM

## 2021-07-29 RX ORDER — SODIUM CHLORIDE 0.9 % (FLUSH) 0.9 %
3-10 SYRINGE (ML) INJECTION AS NEEDED
Status: DISCONTINUED | OUTPATIENT
Start: 2021-07-29 | End: 2021-07-29 | Stop reason: HOSPADM

## 2021-07-29 RX ORDER — IPRATROPIUM BROMIDE AND ALBUTEROL SULFATE 2.5; .5 MG/3ML; MG/3ML
3 SOLUTION RESPIRATORY (INHALATION) ONCE AS NEEDED
Status: DISCONTINUED | OUTPATIENT
Start: 2021-07-29 | End: 2021-07-29 | Stop reason: HOSPADM

## 2021-07-29 RX ORDER — SODIUM CHLORIDE 0.9 % (FLUSH) 0.9 %
10 SYRINGE (ML) INJECTION EVERY 12 HOURS SCHEDULED
Status: DISCONTINUED | OUTPATIENT
Start: 2021-07-29 | End: 2021-07-29 | Stop reason: HOSPADM

## 2021-07-29 RX ORDER — SODIUM CHLORIDE 9 MG/ML
INJECTION, SOLUTION INTRAVENOUS CONTINUOUS PRN
Status: DISCONTINUED | OUTPATIENT
Start: 2021-07-29 | End: 2021-07-29 | Stop reason: SURG

## 2021-07-29 RX ORDER — SODIUM CHLORIDE 0.9 % (FLUSH) 0.9 %
10 SYRINGE (ML) INJECTION AS NEEDED
Status: DISCONTINUED | OUTPATIENT
Start: 2021-07-29 | End: 2021-07-29 | Stop reason: HOSPADM

## 2021-07-29 RX ORDER — LIDOCAINE HYDROCHLORIDE 10 MG/ML
INJECTION, SOLUTION EPIDURAL; INFILTRATION; INTRACAUDAL; PERINEURAL AS NEEDED
Status: DISCONTINUED | OUTPATIENT
Start: 2021-07-29 | End: 2021-07-29 | Stop reason: SURG

## 2021-07-29 RX ORDER — PHENYLEPHRINE HCL IN 0.9% NACL 1 MG/10 ML
SYRINGE (ML) INTRAVENOUS AS NEEDED
Status: DISCONTINUED | OUTPATIENT
Start: 2021-07-29 | End: 2021-07-29 | Stop reason: SURG

## 2021-07-29 RX ORDER — SODIUM CHLORIDE, SODIUM LACTATE, POTASSIUM CHLORIDE, CALCIUM CHLORIDE 600; 310; 30; 20 MG/100ML; MG/100ML; MG/100ML; MG/100ML
9 INJECTION, SOLUTION INTRAVENOUS CONTINUOUS PRN
Status: DISCONTINUED | OUTPATIENT
Start: 2021-07-29 | End: 2021-07-29 | Stop reason: HOSPADM

## 2021-07-29 RX ORDER — FERROUS SULFATE TAB EC 324 MG (65 MG FE EQUIVALENT) 324 (65 FE) MG
324 TABLET DELAYED RESPONSE ORAL
Qty: 30 TABLET | Refills: 2 | Status: ON HOLD | OUTPATIENT
Start: 2021-07-30 | End: 2022-10-23

## 2021-07-29 RX ORDER — SODIUM CHLORIDE 9 MG/ML
100 INJECTION, SOLUTION INTRAVENOUS CONTINUOUS
Status: DISCONTINUED | OUTPATIENT
Start: 2021-07-29 | End: 2021-07-29 | Stop reason: HOSPADM

## 2021-07-29 RX ORDER — SODIUM CHLORIDE 0.9 % (FLUSH) 0.9 %
3 SYRINGE (ML) INJECTION EVERY 12 HOURS SCHEDULED
Status: DISCONTINUED | OUTPATIENT
Start: 2021-07-29 | End: 2021-07-29 | Stop reason: HOSPADM

## 2021-07-29 RX ADMIN — LUBIPROSTONE 24 MCG: 24 CAPSULE, GELATIN COATED ORAL at 11:11

## 2021-07-29 RX ADMIN — DICLOFENAC SODIUM 2 G: 10 GEL TOPICAL at 11:16

## 2021-07-29 RX ADMIN — POTASSIUM CHLORIDE 20 MEQ: 1500 TABLET, EXTENDED RELEASE ORAL at 11:09

## 2021-07-29 RX ADMIN — FENOFIBRATE 145 MG: 145 TABLET ORAL at 11:09

## 2021-07-29 RX ADMIN — PROPOFOL 10 MG: 10 INJECTION, EMULSION INTRAVENOUS at 09:45

## 2021-07-29 RX ADMIN — POLYETHYLENE GLYCOL 3350 17 G: 17 POWDER, FOR SOLUTION ORAL at 11:11

## 2021-07-29 RX ADMIN — LEVOTHYROXINE SODIUM 137 MCG: 0.03 TABLET ORAL at 06:36

## 2021-07-29 RX ADMIN — BUDESONIDE AND FORMOTEROL FUMARATE DIHYDRATE 2 PUFF: 160; 4.5 AEROSOL RESPIRATORY (INHALATION) at 07:07

## 2021-07-29 RX ADMIN — PROPOFOL 20 MG: 10 INJECTION, EMULSION INTRAVENOUS at 09:42

## 2021-07-29 RX ADMIN — LIDOCAINE HYDROCHLORIDE 60 MG: 10 INJECTION, SOLUTION EPIDURAL; INFILTRATION; INTRACAUDAL; PERINEURAL at 09:40

## 2021-07-29 RX ADMIN — HYDROCODONE BITARTRATE AND ACETAMINOPHEN 1 TABLET: 10; 325 TABLET ORAL at 11:09

## 2021-07-29 RX ADMIN — PROPOFOL 50 MG: 10 INJECTION, EMULSION INTRAVENOUS at 09:40

## 2021-07-29 RX ADMIN — MICONAZOLE NITRATE: 20 CREAM TOPICAL at 11:16

## 2021-07-29 RX ADMIN — HYDROCODONE BITARTRATE AND ACETAMINOPHEN 1 TABLET: 10; 325 TABLET ORAL at 06:36

## 2021-07-29 RX ADMIN — ALLOPURINOL 100 MG: 100 TABLET ORAL at 11:09

## 2021-07-29 RX ADMIN — PREGABALIN 50 MG: 25 CAPSULE ORAL at 11:09

## 2021-07-29 RX ADMIN — PANTOPRAZOLE SODIUM 40 MG: 40 TABLET, DELAYED RELEASE ORAL at 06:37

## 2021-07-29 RX ADMIN — CIPROFLOXACIN 500 MG: 500 TABLET, FILM COATED ORAL at 11:11

## 2021-07-29 RX ADMIN — OXYCODONE HYDROCHLORIDE AND ACETAMINOPHEN 500 MG: 500 TABLET ORAL at 11:09

## 2021-07-29 RX ADMIN — CARBIDOPA AND LEVODOPA 2 TABLET: 25; 100 TABLET ORAL at 11:08

## 2021-07-29 RX ADMIN — SODIUM CHLORIDE: 0.9 INJECTION, SOLUTION INTRAVENOUS at 09:38

## 2021-07-29 RX ADMIN — FUROSEMIDE 20 MG: 20 TABLET ORAL at 11:08

## 2021-07-29 RX ADMIN — ROPINIROLE HYDROCHLORIDE 3 MG: 1 TABLET, FILM COATED ORAL at 11:08

## 2021-07-29 RX ADMIN — Medication 100 MCG: at 09:40

## 2021-07-29 RX ADMIN — BACLOFEN 5 MG: 10 TABLET ORAL at 11:09

## 2021-07-29 NOTE — ANESTHESIA POSTPROCEDURE EVALUATION
Patient: Lorenza Owens    Procedure Summary     Date: 07/29/21 Room / Location: [facility] ENDOSCOPY 1 / [facility] ENDOSCOPY    Anesthesia Start: 0938 Anesthesia Stop: 0957    Procedure: ESOPHAGOGASTRODUODENOSCOPY (N/A ) Diagnosis:       Anemia, unspecified type      (Anemia, unspecified type [D64.9])    Surgeons: DANIEL Harris MD Provider: Noman Aguayo DO    Anesthesia Type: MAC ASA Status: 4          Anesthesia Type: MAC    Vitals  Vitals Value Taken Time   /42 07/29/21 1027   Temp     Pulse 82 07/29/21 1030   Resp 15 07/29/21 1022   SpO2 93 % 07/29/21 1030   Vitals shown include unvalidated device data.        Post Anesthesia Care and Evaluation    Patient location during evaluation: PACU  Patient participation: complete - patient participated  Level of consciousness: awake  Pain scale: See nurse's notes for pain score.  Pain management: adequate  Airway patency: patent  Anesthetic complications: No anesthetic complications  PONV Status: none  Cardiovascular status: acceptable  Respiratory status: acceptable  Hydration status: acceptable    Comments: Patient seen and examined postoperatively; vital signs stable; SpO2 greater than or equal to 90%; cardiopulmonary status stable; nausea/vomiting adequately controlled; pain adequately controlled; no apparent anesthesia complications; patient discharged from anesthesia care when discharge criteria were met

## 2021-07-29 NOTE — ANESTHESIA PREPROCEDURE EVALUATION
Anesthesia Evaluation     Patient summary reviewed and Nursing notes reviewed   NPO Solid Status: > 8 hours  NPO Liquid Status: > 8 hours           Airway   Mallampati: I  TM distance: >3 FB  Neck ROM: full  No difficulty expected  Dental - normal exam     Pulmonary - normal exam   (+) pneumonia , sleep apnea,   Cardiovascular - normal exam    (+) DVT, hyperlipidemia,       Neuro/Psych  (+) weakness, psychiatric history Anxiety,     GI/Hepatic/Renal/Endo    (+)  GERD, GI bleeding , renal disease ARF,     Musculoskeletal     Abdominal  - normal exam    Bowel sounds: normal.   Substance History - negative use     OB/GYN negative ob/gyn ROS         Other   arthritis,      ROS/Med Hx Other: Wheel chair bound                  Anesthesia Plan    ASA 4     MAC   total IV anesthesia  intravenous induction     Anesthetic plan, all risks, benefits, and alternatives have been provided, discussed and informed consent has been obtained with: patient.    Plan discussed with CAA and CRNA.

## 2021-08-10 ENCOUNTER — OFFICE VISIT (OUTPATIENT)
Dept: WOUND CARE | Facility: HOSPITAL | Age: 86
End: 2021-08-10

## 2021-08-17 ENCOUNTER — OFFICE VISIT (OUTPATIENT)
Dept: WOUND CARE | Facility: HOSPITAL | Age: 86
End: 2021-08-17

## 2021-08-17 PROCEDURE — G0463 HOSPITAL OUTPT CLINIC VISIT: HCPCS

## 2021-08-24 ENCOUNTER — APPOINTMENT (OUTPATIENT)
Dept: WOUND CARE | Facility: HOSPITAL | Age: 86
End: 2021-08-24

## 2021-09-07 ENCOUNTER — APPOINTMENT (OUTPATIENT)
Dept: WOUND CARE | Facility: HOSPITAL | Age: 86
End: 2021-09-07

## 2021-09-14 ENCOUNTER — OFFICE VISIT (OUTPATIENT)
Dept: WOUND CARE | Facility: HOSPITAL | Age: 86
End: 2021-09-14

## 2021-09-14 PROCEDURE — G0463 HOSPITAL OUTPT CLINIC VISIT: HCPCS

## 2021-09-28 ENCOUNTER — OFFICE VISIT (OUTPATIENT)
Dept: WOUND CARE | Facility: HOSPITAL | Age: 86
End: 2021-09-28

## 2021-10-12 ENCOUNTER — OFFICE VISIT (OUTPATIENT)
Dept: WOUND CARE | Facility: HOSPITAL | Age: 86
End: 2021-10-12

## 2021-10-12 ENCOUNTER — APPOINTMENT (OUTPATIENT)
Dept: WOUND CARE | Facility: HOSPITAL | Age: 86
End: 2021-10-12

## 2021-10-26 ENCOUNTER — OFFICE VISIT (OUTPATIENT)
Dept: WOUND CARE | Facility: HOSPITAL | Age: 86
End: 2021-10-26

## 2021-10-26 PROCEDURE — G0463 HOSPITAL OUTPT CLINIC VISIT: HCPCS

## 2021-11-09 ENCOUNTER — OFFICE VISIT (OUTPATIENT)
Dept: WOUND CARE | Facility: HOSPITAL | Age: 86
End: 2021-11-09

## 2021-11-22 NOTE — OUTREACH NOTE
Medical Week 1 Survey      Responses   Facility patient discharged from?  [facility]   Does the patient have one of the following disease processes/diagnoses(primary or secondary)?  Other   Is there a successful TCM telephone encounter documented?  No   Week 1 attempt successful?  No   Rescheduled  Revoked   Revoke  Decline to participate [PATIENT ANSWERED, BUT WAS PLAYING BINGO AT [facility]IST[facility]IV[facility] AND DECLINED INTEREST IN THE CALL. ]          Chrissie Conroy, MALCOLMN  
no ROM deficits were identified

## 2021-11-23 ENCOUNTER — APPOINTMENT (OUTPATIENT)
Dept: WOUND CARE | Facility: HOSPITAL | Age: 86
End: 2021-11-23

## 2021-11-30 ENCOUNTER — OFFICE VISIT (OUTPATIENT)
Dept: WOUND CARE | Facility: HOSPITAL | Age: 86
End: 2021-11-30

## 2021-12-14 ENCOUNTER — OFFICE VISIT (OUTPATIENT)
Dept: WOUND CARE | Facility: HOSPITAL | Age: 86
End: 2021-12-14

## 2021-12-28 ENCOUNTER — APPOINTMENT (OUTPATIENT)
Dept: WOUND CARE | Facility: HOSPITAL | Age: 86
End: 2021-12-28

## 2022-01-04 ENCOUNTER — APPOINTMENT (OUTPATIENT)
Dept: WOUND CARE | Facility: HOSPITAL | Age: 87
End: 2022-01-04

## 2022-01-06 ENCOUNTER — OFFICE VISIT (OUTPATIENT)
Dept: WOUND CARE | Facility: HOSPITAL | Age: 87
End: 2022-01-06

## 2022-01-18 ENCOUNTER — OFFICE VISIT (OUTPATIENT)
Dept: WOUND CARE | Facility: HOSPITAL | Age: 87
End: 2022-01-18

## 2022-01-18 ENCOUNTER — APPOINTMENT (OUTPATIENT)
Dept: WOUND CARE | Facility: HOSPITAL | Age: 87
End: 2022-01-18

## 2022-02-07 ENCOUNTER — HOSPITAL ENCOUNTER (OUTPATIENT)
Dept: INTERVENTIONAL RADIOLOGY/VASCULAR | Facility: HOSPITAL | Age: 87
Discharge: HOME | End: 2022-02-07
Admitting: NURSE PRACTITIONER

## 2022-02-07 VITALS
SYSTOLIC BLOOD PRESSURE: 126 MMHG | DIASTOLIC BLOOD PRESSURE: 58 MMHG | BODY MASS INDEX: 37.3 KG/M2 | OXYGEN SATURATION: 93 % | TEMPERATURE: 98.1 F | WEIGHT: 190 LBS | RESPIRATION RATE: 17 BRPM | HEART RATE: 83 BPM | HEIGHT: 60 IN

## 2022-02-07 DIAGNOSIS — Z45.2 ENCOUNTER FOR INSERTION OF VENOUS ACCESS PORT: ICD-10-CM

## 2022-02-07 LAB
APTT PPP: 27.3 SECONDS (ref 24–31)
BASOPHILS # BLD AUTO: 0 10*3/MM3 (ref 0–0.2)
BASOPHILS NFR BLD AUTO: 1 % (ref 0–1.5)
DEPRECATED RDW RBC AUTO: 58.2 FL (ref 37–54)
EOSINOPHIL # BLD AUTO: 0.2 10*3/MM3 (ref 0–0.4)
EOSINOPHIL NFR BLD AUTO: 5.1 % (ref 0.3–6.2)
ERYTHROCYTE [DISTWIDTH] IN BLOOD BY AUTOMATED COUNT: 19.1 % (ref 12.3–15.4)
HCT VFR BLD AUTO: 28.5 % (ref 34–46.6)
HGB BLD-MCNC: 9.6 G/DL (ref 12–15.9)
INR PPP: 1.17 (ref 0.93–1.1)
LYMPHOCYTES # BLD AUTO: 0.8 10*3/MM3 (ref 0.7–3.1)
LYMPHOCYTES NFR BLD AUTO: 22.2 % (ref 19.6–45.3)
MCH RBC QN AUTO: 29.3 PG (ref 26.6–33)
MCHC RBC AUTO-ENTMCNC: 33.7 G/DL (ref 31.5–35.7)
MCV RBC AUTO: 86.9 FL (ref 79–97)
MONOCYTES # BLD AUTO: 0.4 10*3/MM3 (ref 0.1–0.9)
MONOCYTES NFR BLD AUTO: 9.8 % (ref 5–12)
NEUTROPHILS NFR BLD AUTO: 2.2 10*3/MM3 (ref 1.7–7)
NEUTROPHILS NFR BLD AUTO: 61.9 % (ref 42.7–76)
NRBC BLD AUTO-RTO: 0.1 /100 WBC (ref 0–0.2)
PLATELET # BLD AUTO: 179 10*3/MM3 (ref 140–450)
PMV BLD AUTO: 6.7 FL (ref 6–12)
PROTHROMBIN TIME: 12.8 SECONDS (ref 9.6–11.7)
RBC # BLD AUTO: 3.28 10*6/MM3 (ref 3.77–5.28)
WBC NRBC COR # BLD: 3.6 10*3/MM3 (ref 3.4–10.8)

## 2022-02-07 PROCEDURE — 25010000002 ONDANSETRON PER 1 MG: Performed by: RADIOLOGY

## 2022-02-07 PROCEDURE — 99152 MOD SED SAME PHYS/QHP 5/>YRS: CPT

## 2022-02-07 PROCEDURE — 85730 THROMBOPLASTIN TIME PARTIAL: CPT | Performed by: RADIOLOGY

## 2022-02-07 PROCEDURE — C1894 INTRO/SHEATH, NON-LASER: HCPCS

## 2022-02-07 PROCEDURE — 77001 FLUOROGUIDE FOR VEIN DEVICE: CPT

## 2022-02-07 PROCEDURE — 25010000002 FENTANYL CITRATE (PF) 50 MCG/ML SOLUTION: Performed by: RADIOLOGY

## 2022-02-07 PROCEDURE — 85025 COMPLETE CBC W/AUTO DIFF WBC: CPT | Performed by: RADIOLOGY

## 2022-02-07 PROCEDURE — 85610 PROTHROMBIN TIME: CPT | Performed by: RADIOLOGY

## 2022-02-07 PROCEDURE — 0 LIDOCAINE 1 % SOLUTION: Performed by: RADIOLOGY

## 2022-02-07 PROCEDURE — 99153 MOD SED SAME PHYS/QHP EA: CPT

## 2022-02-07 PROCEDURE — 25010000002 HEPARIN LOCK FLUSH PER 10 UNITS: Performed by: RADIOLOGY

## 2022-02-07 PROCEDURE — C1769 GUIDE WIRE: HCPCS

## 2022-02-07 PROCEDURE — C1788 PORT, INDWELLING, IMP: HCPCS

## 2022-02-07 PROCEDURE — 25010000002 MIDAZOLAM PER 1 MG: Performed by: RADIOLOGY

## 2022-02-07 PROCEDURE — 76937 US GUIDE VASCULAR ACCESS: CPT

## 2022-02-07 RX ORDER — MIDAZOLAM HYDROCHLORIDE 1 MG/ML
INJECTION INTRAMUSCULAR; INTRAVENOUS
Status: COMPLETED | OUTPATIENT
Start: 2022-02-07 | End: 2022-02-07

## 2022-02-07 RX ORDER — SODIUM CHLORIDE 0.9 % (FLUSH) 0.9 %
10 SYRINGE (ML) INJECTION EVERY 12 HOURS SCHEDULED
Status: DISCONTINUED | OUTPATIENT
Start: 2022-02-07 | End: 2022-02-07 | Stop reason: HOSPADM

## 2022-02-07 RX ORDER — HEPARIN SODIUM (PORCINE) LOCK FLUSH IV SOLN 100 UNIT/ML 100 UNIT/ML
SOLUTION INTRAVENOUS
Status: COMPLETED | OUTPATIENT
Start: 2022-02-07 | End: 2022-02-07

## 2022-02-07 RX ORDER — SODIUM CHLORIDE 9 MG/ML
75 INJECTION, SOLUTION INTRAVENOUS CONTINUOUS
Status: DISCONTINUED | OUTPATIENT
Start: 2022-02-07 | End: 2022-02-08 | Stop reason: HOSPADM

## 2022-02-07 RX ORDER — FENTANYL CITRATE 50 UG/ML
INJECTION, SOLUTION INTRAMUSCULAR; INTRAVENOUS
Status: COMPLETED | OUTPATIENT
Start: 2022-02-07 | End: 2022-02-07

## 2022-02-07 RX ORDER — SODIUM CHLORIDE 0.9 % (FLUSH) 0.9 %
10 SYRINGE (ML) INJECTION AS NEEDED
Status: DISCONTINUED | OUTPATIENT
Start: 2022-02-07 | End: 2022-02-07 | Stop reason: HOSPADM

## 2022-02-07 RX ORDER — ONDANSETRON 2 MG/ML
INJECTION INTRAMUSCULAR; INTRAVENOUS
Status: COMPLETED | OUTPATIENT
Start: 2022-02-07 | End: 2022-02-07

## 2022-02-07 RX ORDER — LIDOCAINE HYDROCHLORIDE 10 MG/ML
INJECTION, SOLUTION INFILTRATION; PERINEURAL
Status: COMPLETED | OUTPATIENT
Start: 2022-02-07 | End: 2022-02-07

## 2022-02-07 RX ORDER — LIDOCAINE HYDROCHLORIDE AND EPINEPHRINE 10; 10 MG/ML; UG/ML
INJECTION, SOLUTION INFILTRATION; PERINEURAL
Status: COMPLETED | OUTPATIENT
Start: 2022-02-07 | End: 2022-02-07

## 2022-02-07 RX ADMIN — FENTANYL CITRATE 50 MCG: 50 INJECTION, SOLUTION INTRAMUSCULAR; INTRAVENOUS at 09:50

## 2022-02-07 RX ADMIN — FENTANYL CITRATE 50 MCG: 50 INJECTION, SOLUTION INTRAMUSCULAR; INTRAVENOUS at 09:58

## 2022-02-07 RX ADMIN — HEPARIN SODIUM (PORCINE) LOCK FLUSH IV SOLN 100 UNIT/ML 500 UNITS: 100 SOLUTION at 10:15

## 2022-02-07 RX ADMIN — MIDAZOLAM 0.5 MG: 1 INJECTION INTRAMUSCULAR; INTRAVENOUS at 09:50

## 2022-02-07 RX ADMIN — LIDOCAINE HYDROCHLORIDE,EPINEPHRINE BITARTRATE 6 ML: 10; .01 INJECTION, SOLUTION INFILTRATION; PERINEURAL at 09:58

## 2022-02-07 RX ADMIN — MIDAZOLAM 0.5 MG: 1 INJECTION INTRAMUSCULAR; INTRAVENOUS at 09:46

## 2022-02-07 RX ADMIN — ONDANSETRON 4 MG: 2 INJECTION INTRAMUSCULAR; INTRAVENOUS at 09:34

## 2022-02-07 RX ADMIN — CEFAZOLIN SODIUM 2 G: 1 INJECTION, POWDER, FOR SOLUTION INTRAMUSCULAR; INTRAVENOUS at 09:29

## 2022-02-07 RX ADMIN — LIDOCAINE HYDROCHLORIDE 7 ML: 10 INJECTION, SOLUTION INFILTRATION; PERINEURAL at 09:58

## 2022-02-07 RX ADMIN — FENTANYL CITRATE 50 MCG: 50 INJECTION, SOLUTION INTRAMUSCULAR; INTRAVENOUS at 09:46

## 2022-02-07 RX ADMIN — MIDAZOLAM 0.5 MG: 1 INJECTION INTRAMUSCULAR; INTRAVENOUS at 09:58

## 2022-02-07 RX ADMIN — Medication 10 ML: at 08:50

## 2022-02-07 RX ADMIN — SODIUM CHLORIDE 75 ML/HR: 0.9 INJECTION, SOLUTION INTRAVENOUS at 08:50

## 2022-02-07 RX ADMIN — LIDOCAINE HYDROCHLORIDE 3 ML: 10 INJECTION, SOLUTION INFILTRATION; PERINEURAL at 09:48

## 2022-02-07 NOTE — NURSING NOTE
Dr. Carrera numbed area on pt's right chest and made incision in right chest. Dr. Carrera is forming infusaport pocket.

## 2022-02-07 NOTE — NURSING NOTE
Pt brought to IR lab D procedure room via stretcher, on room air, with face mask in place and laying semi-fowlers. Pt is on cardiac monitor. Pt is awake and slightly drowsy, but oriented x3. Pt is relaxed and cooperative. Pt skin is flesh, warm, and dry. Pt and pt's daughter were educated on infusaport procedure along with medications used for procedure, while in pre-op area, and voiced understanding.

## 2022-02-07 NOTE — NURSING NOTE
Dr. Carrera heparinized port per protocol; see MAR. Dr. Carrera took final fluoroscopy image of infusaport placed in pt's right chest. Per Dr. Carrera, port is ready for use.

## 2022-02-07 NOTE — NURSING NOTE
Dr. Carrera numbed area on pt's right neck and is using glidewire to obtain access to pt's right IJ vein; procedure continues.

## 2022-02-07 NOTE — NURSING NOTE
Dr. Carrera is beginning to suture right neck puncture site and right chest incision closed. See MD dictation for procedural specific information.

## 2022-02-07 NOTE — DISCHARGE INSTRUCTIONS
Implanted Port Insertion, Care After    Resume all your usual medications.    Return for a one time port site check on Monday, February 14.  Be in main registration at 12 noon for a 12:30 pm appointment.    What can I expect after the procedure?  After the procedure, it is common to have:  · Discomfort at the port insertion site.  · Bruising on the skin over the port. This should improve over 3-4 days.  Follow these instructions at home:  Port care  · After your port is placed, you will get a 's information card. The card has information about your port. Keep this card with you at all times.  · Take care of the port.  · Make sure to remember what type of port you have.  Incision care         · Follow instructions from your health care provider about how to take care of your port insertion site. Make sure you:  ? Wash your hands with soap and water before and after you touch your port site. If soap and water are not available, use hand .  ? Return for a one time port site check.  · Leave skin glue in place. This skin closure may need to stay in place for 2 weeks or longer. Do not pick, scratch, or rub your port site.  This may cause bleeding.  · Check your port insertion site every day for signs of infection. Check for:  ? Redness, swelling, or pain.  ? Fluid or blood.  ? Warmth.  ? Pus or a bad smell.  Activity  · Return to your normal activities in 48 hours.  · Do not lift anything that is heavier than 10 lb for 48 hours.  General instructions  · Take over-the-counter and prescription medicines as directed for pain.  · Do not take baths, swim, or use a hot tub until after your port site check. You may take showers starting tomorrow. Do not drive for 24 hours if you were given a sedative during your procedure.  · Wear a medical alert bracelet in case of an emergency. This will tell any health care providers that you have a port.  · Keep all follow-up visits as told by your health care provider.  This is important.  Contact a health care provider if:  · You cannot flush your port with saline as directed, or you cannot draw blood from the port.  · You have a fever or chills.  · You have redness, swelling, or pain around your port insertion site.  · You have fluid or blood coming from your port insertion site.  · Your port insertion site feels warm to the touch.  · You have pus or a bad smell coming from the port insertion site.  Get help right away if:  · You have chest pain or shortness of breath.  · You have bleeding from your port that you cannot control.  Summary  · Take care of the port. Keep the 's information card with you at all times.  · Return for a one time port site check.  · Contact a health care provider if you have a fever or chills or if you have redness, swelling, or pain around your port insertion site.  · Keep all follow-up visits as told by your health care provider.

## 2022-02-07 NOTE — NURSING NOTE
Needle removed from right IJ vein and Dr. Carrera is again attempting access to right IJ vein, using ultrasound guidance and fluoroscopy image guidance.

## 2022-02-07 NOTE — NURSING NOTE
Dr. Carrera successfully placed a 6F [employer] SmartPort (PowerPort) in pt's right chest, using the right IJ vein access. Lot # [id number]. Dr. Carrera confirmed proper placement under fluoroscopy imaging.

## 2022-02-07 NOTE — NURSING NOTE
Pt moved by IR staff, using slide board, back onto her stretcher laying semi-fowlers position. Pt is back on room air and remains on cardiac monitor.

## 2022-02-07 NOTE — POST-PROCEDURE NOTE
IR POST OP NOTE    Procedure:Port placement      Pre Op DX:Poor venous access      Post Op DX:same      Anesthesia: Local, CS      Findings:See dictation      Complications:No       Provider Signature: Dr. Thierno Carrera

## 2022-02-07 NOTE — NURSING NOTE
Pt moved by IR staff using slide board from her bed onto procedure table into supine position. Pt has warm blankets for comfort and remains on cardiac monitor. Pt is being placed on 2L oxygen via N/C for conscious sedation procedure. Right IJ vein confirmed patent using ultrasound imaging.

## 2022-02-08 ENCOUNTER — OFFICE VISIT (OUTPATIENT)
Dept: WOUND CARE | Facility: HOSPITAL | Age: 87
End: 2022-02-08

## 2022-02-08 PROCEDURE — G0463 HOSPITAL OUTPT CLINIC VISIT: HCPCS

## 2022-02-12 ENCOUNTER — LAB REQUISITION (OUTPATIENT)
Dept: LAB | Facility: HOSPITAL | Age: 87
End: 2022-02-12

## 2022-02-12 DIAGNOSIS — Z11.52 ENCOUNTER FOR SCREENING FOR COVID-19: ICD-10-CM

## 2022-02-12 LAB — SARS-COV-2 ORF1AB RESP QL NAA+PROBE: NOT DETECTED

## 2022-02-12 PROCEDURE — U0005 INFEC AGEN DETEC AMPLI PROBE: HCPCS | Performed by: NURSE PRACTITIONER

## 2022-02-12 PROCEDURE — U0004 COV-19 TEST NON-CDC HGH THRU: HCPCS | Performed by: NURSE PRACTITIONER

## 2022-02-14 ENCOUNTER — HOSPITAL ENCOUNTER (OUTPATIENT)
Dept: INTERVENTIONAL RADIOLOGY/VASCULAR | Facility: HOSPITAL | Age: 87
Discharge: HOME | End: 2022-02-14
Admitting: RADIOLOGY

## 2022-02-14 DIAGNOSIS — Z45.2 ENCOUNTER FOR CARE RELATED TO PORT-A-CATH: ICD-10-CM

## 2022-02-14 PROCEDURE — G0463 HOSPITAL OUTPT CLINIC VISIT: HCPCS

## 2022-02-14 NOTE — NURSING NOTE
Patient's port site inspected.  Incision well approximated, dermabond remains partially intact.  No redness, swelling or discharge present.  Patient denies pain.  Patient reminded not to pick or scratch at dermabond and to report signs of infection immediately to referring provider.

## 2022-05-03 ENCOUNTER — APPOINTMENT (OUTPATIENT)
Dept: WOUND CARE | Facility: HOSPITAL | Age: 87
End: 2022-05-03

## 2022-05-31 DIAGNOSIS — D46.0 REFRACTORY ANEMIA WITHOUT SIDEROBLASTS: ICD-10-CM

## 2022-05-31 DIAGNOSIS — D46.A RCMD (REFRACTORY CYTOPENIA WITH MULTILINEAGE DYSPLASIA): ICD-10-CM

## 2022-05-31 DIAGNOSIS — D64.9 ANEMIA, UNSPECIFIED TYPE: Primary | ICD-10-CM

## 2022-06-01 ENCOUNTER — HOSPITAL ENCOUNTER (OUTPATIENT)
Dept: INFUSION THERAPY | Facility: HOSPITAL | Age: 87
Setting detail: INFUSION SERIES
Discharge: HOME | End: 2022-06-01

## 2022-06-01 VITALS
SYSTOLIC BLOOD PRESSURE: 134 MMHG | RESPIRATION RATE: 16 BRPM | OXYGEN SATURATION: 99 % | DIASTOLIC BLOOD PRESSURE: 49 MMHG | TEMPERATURE: 97.8 F | HEART RATE: 87 BPM

## 2022-06-01 DIAGNOSIS — Z45.2 ENCOUNTER FOR CARE RELATED TO VASCULAR ACCESS PORT: ICD-10-CM

## 2022-06-01 DIAGNOSIS — D64.9 SEVERE ANEMIA: ICD-10-CM

## 2022-06-01 DIAGNOSIS — D46.A RCMD (REFRACTORY CYTOPENIA WITH MULTILINEAGE DYSPLASIA): ICD-10-CM

## 2022-06-01 DIAGNOSIS — D64.9 ANEMIA, UNSPECIFIED TYPE: Primary | ICD-10-CM

## 2022-06-01 LAB
ABO GROUP BLD: NORMAL
BB HOLD TUBE: NORMAL
BLD GP AB SCN SERPL QL: NEGATIVE
HCT VFR BLD AUTO: 21.1 % (ref 34–46.6)
HGB BLD-MCNC: 6.8 G/DL (ref 12–15.9)
RH BLD: POSITIVE
T&S EXPIRATION DATE: NORMAL

## 2022-06-01 PROCEDURE — 86901 BLOOD TYPING SEROLOGIC RH(D): CPT | Performed by: INTERNAL MEDICINE

## 2022-06-01 PROCEDURE — 85014 HEMATOCRIT: CPT | Performed by: NURSE PRACTITIONER

## 2022-06-01 PROCEDURE — 86900 BLOOD TYPING SEROLOGIC ABO: CPT

## 2022-06-01 PROCEDURE — 85018 HEMOGLOBIN: CPT | Performed by: NURSE PRACTITIONER

## 2022-06-01 PROCEDURE — 86923 COMPATIBILITY TEST ELECTRIC: CPT

## 2022-06-01 PROCEDURE — P9016 RBC LEUKOCYTES REDUCED: HCPCS

## 2022-06-01 PROCEDURE — 36591 DRAW BLOOD OFF VENOUS DEVICE: CPT

## 2022-06-01 PROCEDURE — 86900 BLOOD TYPING SEROLOGIC ABO: CPT | Performed by: INTERNAL MEDICINE

## 2022-06-01 PROCEDURE — 36430 TRANSFUSION BLD/BLD COMPNT: CPT

## 2022-06-01 PROCEDURE — 25010000002 HEPARIN LOCK FLUSH PER 10 UNITS: Performed by: INTERNAL MEDICINE

## 2022-06-01 PROCEDURE — 86850 RBC ANTIBODY SCREEN: CPT | Performed by: INTERNAL MEDICINE

## 2022-06-01 RX ORDER — BISACODYL 10 MG
10 SUPPOSITORY, RECTAL RECTAL AS NEEDED
COMMUNITY

## 2022-06-01 RX ORDER — SODIUM CHLORIDE 0.9 % (FLUSH) 0.9 %
20 SYRINGE (ML) INJECTION AS NEEDED
Status: DISCONTINUED | OUTPATIENT
Start: 2022-06-01 | End: 2022-06-03 | Stop reason: HOSPADM

## 2022-06-01 RX ORDER — CHOLECALCIFEROL (VITAMIN D3) 125 MCG
10 CAPSULE ORAL
COMMUNITY

## 2022-06-01 RX ORDER — HEPARIN SODIUM (PORCINE) LOCK FLUSH IV SOLN 100 UNIT/ML 100 UNIT/ML
500 SOLUTION INTRAVENOUS AS NEEDED
Status: DISCONTINUED | OUTPATIENT
Start: 2022-06-01 | End: 2022-06-03 | Stop reason: HOSPADM

## 2022-06-01 RX ORDER — GABAPENTIN 100 MG/1
200 CAPSULE ORAL 3 TIMES DAILY
COMMUNITY

## 2022-06-01 RX ORDER — SODIUM CHLORIDE 0.9 % (FLUSH) 0.9 %
10 SYRINGE (ML) INJECTION AS NEEDED
Status: DISCONTINUED | OUTPATIENT
Start: 2022-06-01 | End: 2022-06-03 | Stop reason: HOSPADM

## 2022-06-01 RX ORDER — SODIUM CHLORIDE 0.9 % (FLUSH) 0.9 %
10 SYRINGE (ML) INJECTION AS NEEDED
Status: CANCELLED | OUTPATIENT
Start: 2022-06-01

## 2022-06-01 RX ORDER — SODIUM CHLORIDE 0.9 % (FLUSH) 0.9 %
20 SYRINGE (ML) INJECTION AS NEEDED
Status: CANCELLED | OUTPATIENT
Start: 2022-06-01

## 2022-06-01 RX ORDER — HEPARIN SODIUM (PORCINE) LOCK FLUSH IV SOLN 100 UNIT/ML 100 UNIT/ML
500 SOLUTION INTRAVENOUS AS NEEDED
Status: CANCELLED | OUTPATIENT
Start: 2022-06-01

## 2022-06-01 RX ADMIN — Medication 500 UNITS: at 13:33

## 2022-06-01 RX ADMIN — Medication 20 ML: at 13:32

## 2022-06-02 LAB
BH BB BLOOD EXPIRATION DATE: NORMAL
BH BB BLOOD TYPE BARCODE: 6200
BH BB DISPENSE STATUS: NORMAL
BH BB PRODUCT CODE: NORMAL
BH BB UNIT NUMBER: NORMAL
CROSSMATCH INTERPRETATION: NORMAL
UNIT  ABO: NORMAL
UNIT  RH: NORMAL

## 2022-07-05 DIAGNOSIS — D61.818 OTHER PANCYTOPENIA: Primary | ICD-10-CM

## 2022-07-05 DIAGNOSIS — D46.9 MDS (MYELODYSPLASTIC SYNDROME): ICD-10-CM

## 2022-07-06 ENCOUNTER — HOSPITAL ENCOUNTER (OUTPATIENT)
Dept: INFUSION THERAPY | Facility: HOSPITAL | Age: 87
Setting detail: INFUSION SERIES
Discharge: HOME | End: 2022-07-06

## 2022-07-06 VITALS
HEART RATE: 80 BPM | OXYGEN SATURATION: 99 % | RESPIRATION RATE: 17 BRPM | TEMPERATURE: 98.2 F | SYSTOLIC BLOOD PRESSURE: 116 MMHG | DIASTOLIC BLOOD PRESSURE: 58 MMHG

## 2022-07-06 DIAGNOSIS — D46.9 MYELODYSPLASTIC SYNDROME: Primary | ICD-10-CM

## 2022-07-06 DIAGNOSIS — D46.9 MDS (MYELODYSPLASTIC SYNDROME): ICD-10-CM

## 2022-07-06 LAB
ABO GROUP BLD: NORMAL
BASOPHILS # BLD AUTO: 0 10*3/MM3 (ref 0–0.2)
BASOPHILS NFR BLD AUTO: 0.3 % (ref 0–1.5)
BB HOLD TUBE: NORMAL
BLD GP AB SCN SERPL QL: NEGATIVE
DEPRECATED RDW RBC AUTO: 52.1 FL (ref 37–54)
EOSINOPHIL # BLD AUTO: 0.2 10*3/MM3 (ref 0–0.4)
EOSINOPHIL NFR BLD AUTO: 5.6 % (ref 0.3–6.2)
ERYTHROCYTE [DISTWIDTH] IN BLOOD BY AUTOMATED COUNT: 17.5 % (ref 12.3–15.4)
HCT VFR BLD AUTO: 23.1 % (ref 34–46.6)
HGB BLD-MCNC: 7.1 G/DL (ref 12–15.9)
LYMPHOCYTES # BLD AUTO: 0.6 10*3/MM3 (ref 0.7–3.1)
LYMPHOCYTES NFR BLD AUTO: 18 % (ref 19.6–45.3)
MCH RBC QN AUTO: 25.5 PG (ref 26.6–33)
MCHC RBC AUTO-ENTMCNC: 30.7 G/DL (ref 31.5–35.7)
MCV RBC AUTO: 82.8 FL (ref 79–97)
MONOCYTES # BLD AUTO: 0.4 10*3/MM3 (ref 0.1–0.9)
MONOCYTES NFR BLD AUTO: 11.1 % (ref 5–12)
NEUTROPHILS NFR BLD AUTO: 2.2 10*3/MM3 (ref 1.7–7)
NEUTROPHILS NFR BLD AUTO: 65 % (ref 42.7–76)
NRBC BLD AUTO-RTO: 0.1 /100 WBC (ref 0–0.2)
PLATELET # BLD AUTO: 265 10*3/MM3 (ref 140–450)
PMV BLD AUTO: 6.1 FL (ref 6–12)
RBC # BLD AUTO: 2.79 10*6/MM3 (ref 3.77–5.28)
RH BLD: POSITIVE
T&S EXPIRATION DATE: NORMAL
WBC NRBC COR # BLD: 3.4 10*3/MM3 (ref 3.4–10.8)

## 2022-07-06 PROCEDURE — 86900 BLOOD TYPING SEROLOGIC ABO: CPT | Performed by: NURSE PRACTITIONER

## 2022-07-06 PROCEDURE — 86923 COMPATIBILITY TEST ELECTRIC: CPT

## 2022-07-06 PROCEDURE — 86901 BLOOD TYPING SEROLOGIC RH(D): CPT | Performed by: NURSE PRACTITIONER

## 2022-07-06 PROCEDURE — 36415 COLL VENOUS BLD VENIPUNCTURE: CPT

## 2022-07-06 PROCEDURE — 86850 RBC ANTIBODY SCREEN: CPT | Performed by: NURSE PRACTITIONER

## 2022-07-06 PROCEDURE — P9016 RBC LEUKOCYTES REDUCED: HCPCS

## 2022-07-06 PROCEDURE — 85025 COMPLETE CBC W/AUTO DIFF WBC: CPT

## 2022-07-06 PROCEDURE — 86900 BLOOD TYPING SEROLOGIC ABO: CPT

## 2022-07-06 PROCEDURE — 36430 TRANSFUSION BLD/BLD COMPNT: CPT

## 2022-07-29 ENCOUNTER — APPOINTMENT (OUTPATIENT)
Dept: CT IMAGING | Facility: HOSPITAL | Age: 87
End: 2022-07-29

## 2022-07-29 ENCOUNTER — HOSPITAL ENCOUNTER (EMERGENCY)
Facility: HOSPITAL | Age: 87
Discharge: HOME | End: 2022-07-29
Attending: EMERGENCY MEDICINE | Admitting: EMERGENCY MEDICINE

## 2022-07-29 VITALS
DIASTOLIC BLOOD PRESSURE: 59 MMHG | TEMPERATURE: 98.3 F | SYSTOLIC BLOOD PRESSURE: 121 MMHG | HEART RATE: 86 BPM | OXYGEN SATURATION: 95 % | RESPIRATION RATE: 18 BRPM | BODY MASS INDEX: 37.69 KG/M2 | WEIGHT: 192 LBS | HEIGHT: 60 IN

## 2022-07-29 DIAGNOSIS — S01.01XA LACERATION OF SCALP, INITIAL ENCOUNTER: ICD-10-CM
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
## 2022-07-29 DIAGNOSIS — S09.90XA INJURY OF HEAD, INITIAL ENCOUNTER: ICD-10-CM

## 2022-07-29 PROCEDURE — 99283 EMERGENCY DEPT VISIT LOW MDM: CPT

## 2022-07-29 PROCEDURE — 70450 CT HEAD/BRAIN W/O DYE: CPT

## 2022-10-13 ENCOUNTER — APPOINTMENT (OUTPATIENT)
Dept: GENERAL RADIOLOGY | Facility: HOSPITAL | Age: 87
End: 2022-10-13

## 2022-10-13 ENCOUNTER — HOSPITAL ENCOUNTER (EMERGENCY)
Facility: HOSPITAL | Age: 87
Discharge: HOME | End: 2022-10-13
Attending: EMERGENCY MEDICINE | Admitting: EMERGENCY MEDICINE

## 2022-10-13 VITALS
HEART RATE: 87 BPM | BODY MASS INDEX: 37.69 KG/M2 | SYSTOLIC BLOOD PRESSURE: 111 MMHG | HEIGHT: 60 IN | DIASTOLIC BLOOD PRESSURE: 48 MMHG | TEMPERATURE: 99.5 F | OXYGEN SATURATION: 97 % | WEIGHT: 192 LBS | RESPIRATION RATE: 14 BRPM

## 2022-10-13 DIAGNOSIS — R53.1 WEAKNESS: ICD-10-CM

## 2022-10-13 DIAGNOSIS — I95.9 HYPOTENSION, UNSPECIFIED HYPOTENSION TYPE: Primary | ICD-10-CM

## 2022-10-13 LAB
ANION GAP SERPL CALCULATED.3IONS-SCNC: 11 MMOL/L (ref 5–15)
BASOPHILS # BLD AUTO: 0 10*3/MM3 (ref 0–0.2)
BASOPHILS NFR BLD AUTO: 0.7 % (ref 0–1.5)
BUN SERPL-MCNC: 26 MG/DL (ref 8–23)
BUN/CREAT SERPL: 24.3 (ref 7–25)
CALCIUM SPEC-SCNC: 8.9 MG/DL (ref 8.6–10.5)
CHLORIDE SERPL-SCNC: 106 MMOL/L (ref 98–107)
CO2 SERPL-SCNC: 23 MMOL/L (ref 22–29)
CREAT SERPL-MCNC: 1.07 MG/DL (ref 0.57–1)
DEPRECATED RDW RBC AUTO: 73.1 FL (ref 37–54)
EGFRCR SERPLBLD CKD-EPI 2021: 51 ML/MIN/1.73
EOSINOPHIL # BLD AUTO: 0.1 10*3/MM3 (ref 0–0.4)
EOSINOPHIL NFR BLD AUTO: 2.4 % (ref 0.3–6.2)
ERYTHROCYTE [DISTWIDTH] IN BLOOD BY AUTOMATED COUNT: 23.2 % (ref 12.3–15.4)
GLUCOSE SERPL-MCNC: 97 MG/DL (ref 65–99)
HCT VFR BLD AUTO: 28.8 % (ref 34–46.6)
HGB BLD-MCNC: 9.3 G/DL (ref 12–15.9)
LYMPHOCYTES # BLD AUTO: 0.5 10*3/MM3 (ref 0.7–3.1)
LYMPHOCYTES NFR BLD AUTO: 10.2 % (ref 19.6–45.3)
MCH RBC QN AUTO: 29.4 PG (ref 26.6–33)
MCHC RBC AUTO-ENTMCNC: 32.2 G/DL (ref 31.5–35.7)
MCV RBC AUTO: 91.5 FL (ref 79–97)
MONOCYTES # BLD AUTO: 0.2 10*3/MM3 (ref 0.1–0.9)
MONOCYTES NFR BLD AUTO: 4.3 % (ref 5–12)
NEUTROPHILS NFR BLD AUTO: 4.2 10*3/MM3 (ref 1.7–7)
NEUTROPHILS NFR BLD AUTO: 82.4 % (ref 42.7–76)
NRBC BLD AUTO-RTO: 0 /100 WBC (ref 0–0.2)
PLATELET # BLD AUTO: 184 10*3/MM3 (ref 140–450)
PMV BLD AUTO: 6.9 FL (ref 6–12)
POTASSIUM SERPL-SCNC: 4.5 MMOL/L (ref 3.5–5.2)
RBC # BLD AUTO: 3.15 10*6/MM3 (ref 3.77–5.28)
SODIUM SERPL-SCNC: 140 MMOL/L (ref 136–145)
WBC NRBC COR # BLD: 5.1 10*3/MM3 (ref 3.4–10.8)

## 2022-10-13 PROCEDURE — 36415 COLL VENOUS BLD VENIPUNCTURE: CPT

## 2022-10-13 PROCEDURE — 71045 X-RAY EXAM CHEST 1 VIEW: CPT

## 2022-10-13 PROCEDURE — 85025 COMPLETE CBC W/AUTO DIFF WBC: CPT | Performed by: EMERGENCY MEDICINE

## 2022-10-13 PROCEDURE — 80048 BASIC METABOLIC PNL TOTAL CA: CPT | Performed by: EMERGENCY MEDICINE

## 2022-10-13 PROCEDURE — 25010000002 HEPARIN LOCK FLUSH PER 10 UNITS: Performed by: EMERGENCY MEDICINE

## 2022-10-13 PROCEDURE — 99284 EMERGENCY DEPT VISIT MOD MDM: CPT

## 2022-10-13 RX ORDER — HEPARIN SODIUM (PORCINE) LOCK FLUSH IV SOLN 100 UNIT/ML 100 UNIT/ML
500 SOLUTION INTRAVENOUS ONCE
Status: COMPLETED | OUTPATIENT
Start: 2022-10-13 | End: 2022-10-13

## 2022-10-13 RX ORDER — SODIUM CHLORIDE 0.9 % (FLUSH) 0.9 %
10 SYRINGE (ML) INJECTION AS NEEDED
Status: DISCONTINUED | OUTPATIENT
Start: 2022-10-13 | End: 2022-10-13 | Stop reason: HOSPADM

## 2022-10-13 RX ADMIN — SODIUM CHLORIDE 500 ML: 9 INJECTION, SOLUTION INTRAVENOUS at 08:31

## 2022-10-13 RX ADMIN — Medication 500 UNITS: at 09:50

## 2022-10-13 NOTE — ED PROVIDER NOTES
Subjective   History of Present Illness  Patient is an 87-year-old female sent from nursing due to low blood pressure with a systolic blood pressure of 95.  Patient has no complaints.  Patient is currently being treated for UTI and was started on antibiotics within the past 24 hours.        Review of Systems   Constitutional: Negative for chills and fever.   HENT: Negative for congestion and sore throat.    Eyes: Negative for visual disturbance.   Respiratory: Negative for cough and shortness of breath.    Cardiovascular: Negative for chest pain.   Gastrointestinal: Negative for abdominal pain, diarrhea and vomiting.   Endocrine: Negative for polyuria.   Genitourinary: Negative for dysuria and flank pain.   Musculoskeletal: Negative for back pain.   Neurological: Negative for dizziness and headaches.   Psychiatric/Behavioral: Negative for confusion.   A complete review of systems was obtained and is otherwise negative    Past Medical History:   Diagnosis Date   • Acute kidney injury (MIRI) with acute tubular necrosis (ATN) ([facility]) 10/07/2019   • Anemia 04/04/2013   • Anxiety disorder 06/17/2019   • Cellulitis of right lower extremity 10/07/2019   • Depression 01/23/2013   • Edema of lower extremity 05/17/2017   • Fibromyalgia 07/19/2013   • Gastroesophageal reflux disease 06/17/2019   • Hemorrhoids 04/03/2012   • Hyperlipidemia 07/19/2013   • Hypothyroidism 07/19/2013   • Low back pain 02/24/2016   • Myelodysplastic syndrome (HCC) 07/06/2022   • Peripheral venous insufficiency 09/26/2011   • Polyosteoarthritis 12/05/2011   • Sleep apnea 06/21/2013   • Wheelchair dependence 07/19/2013       Allergies   Allergen Reactions   • Latex Rash   • Morphine Confusion       Past Surgical History:   Procedure Laterality Date   • ENDOSCOPY N/A 5/11/2021    Procedure: ESOPHAGOGASTRODUODENOSCOPY ARGON PLASMA COAGULATION;  Surgeon: Lori Light MD;  Location: [facility] ENDOSCOPY;  Service: Gastroenterology;  Laterality: N/A;   ARTERIALVENOUS MALFORMATION   • ENDOSCOPY N/A 7/29/2021    Procedure: ESOPHAGOGASTRODUODENOSCOPY;  Surgeon: DANIEL Harris MD;  Location: [facility] ENDOSCOPY;  Service: Gastroenterology;  Laterality: N/A;  Post: Food in stomach, portal hypertensive gastropathy   • KNEE ARTHROPLASTY         Family History   Problem Relation Age of Onset   • No Known Problems Mother    • No Known Problems Father        Social History     Socioeconomic History   • Marital status:    Tobacco Use   • Smoking status: Never   • Smokeless tobacco: Never   Vaping Use   • Vaping Use: Never used   Substance and Sexual Activity   • Alcohol use: No   • Drug use: No   • Sexual activity: Defer           Objective   Physical Exam  HEENT exam shows TMs to be clear.  Oropharynx clear moist.  Sclera is nonicteric.  Neck has no adenopathy JVD or bruits.  Lungs are clear.  Heart has regular rate rhythm without murmur rub or gallop.  Chest is nontender.  Abdomen is soft nontender.  Patient has normal bowel sounds without rebound or guarding.  Back has no CVA tenderness.  Extremity exam is no cyanosis or edema.  Procedures           ED Course      Results for orders placed or performed during the hospital encounter of 10/13/22   Basic Metabolic Panel    Specimen: Blood   Result Value Ref Range    Glucose 97 65 - 99 mg/dL    BUN 26 (H) 8 - 23 mg/dL    Creatinine 1.07 (H) 0.57 - 1.00 mg/dL    Sodium 140 136 - 145 mmol/L    Potassium 4.5 3.5 - 5.2 mmol/L    Chloride 106 98 - 107 mmol/L    CO2 23.0 22.0 - 29.0 mmol/L    Calcium 8.9 8.6 - 10.5 mg/dL    BUN/Creatinine Ratio 24.3 7.0 - 25.0    Anion Gap 11.0 5.0 - 15.0 mmol/L    eGFR 51.0 (L) >60.0 mL/min/1.73   CBC Auto Differential    Specimen: Blood   Result Value Ref Range    WBC 5.10 3.40 - 10.80 10*3/mm3    RBC 3.15 (L) 3.77 - 5.28 10*6/mm3    Hemoglobin 9.3 (L) 12.0 - 15.9 g/dL    Hematocrit 28.8 (L) 34.0 - 46.6 %    MCV 91.5 79.0 - 97.0 fL    MCH 29.4 26.6 - 33.0 pg    MCHC 32.2 31.5 - 35.7 g/dL     RDW 23.2 (H) 12.3 - 15.4 %    RDW-SD 73.1 (H) 37.0 - 54.0 fl    MPV 6.9 6.0 - 12.0 fL    Platelets 184 140 - 450 10*3/mm3    Neutrophil % 82.4 (H) 42.7 - 76.0 %    Lymphocyte % 10.2 (L) 19.6 - 45.3 %    Monocyte % 4.3 (L) 5.0 - 12.0 %    Eosinophil % 2.4 0.3 - 6.2 %    Basophil % 0.7 0.0 - 1.5 %    Neutrophils, Absolute 4.20 1.70 - 7.00 10*3/mm3    Lymphocytes, Absolute 0.50 (L) 0.70 - 3.10 10*3/mm3    Monocytes, Absolute 0.20 0.10 - 0.90 10*3/mm3    Eosinophils, Absolute 0.10 0.00 - 0.40 10*3/mm3    Basophils, Absolute 0.00 0.00 - 0.20 10*3/mm3    nRBC 0.0 0.0 - 0.2 /100 WBC     XR Chest 1 View    Result Date: 10/13/2022  1.     Areas of atelectasis or scarring are suggested within the lungs.  Electronically Signed By-Magdi Alex MD On:10/13/2022 8:53 AM This report was finalized on [id number] by  Magdi Alex MD.                                         MDM  Number of Diagnoses or Management Options  Diagnosis management comments: Patient has no evidence of acute metabolic abnormality including renal insufficiency or electrolyte abnormality.  She has no evidence acute infectious process.  My chest interpretation shows no cardiomegaly fusion or infiltrate.  Patient maintained a blood pressure greater than 100 systolic throughout her ED visit.  She remained asymptomatic.  Patient will be discharged.  She will follow with her MD for further outpatient evaluation as needed.       Amount and/or Complexity of Data Reviewed  Clinical lab tests: reviewed  Tests in the radiology section of CPT®: reviewed    Risk of Complications, Morbidity, and/or Mortality  Presenting problems: high  Diagnostic procedures: high  Management options: high    Patient Progress  Patient progress: stable      Final diagnoses:   Hypotension, unspecified hypotension type   Weakness       ED Disposition  ED Disposition     ED Disposition   Discharge    Condition   Stable    Comment   --             No follow-up provider specified.        Medication List      No changes were made to your prescriptions during this visit.          Thierno Whitaker MD  10/13/22 0843       Thierno Whitaker MD  10/13/22 0952

## 2022-10-22 ENCOUNTER — HOSPITAL ENCOUNTER (INPATIENT)
Facility: HOSPITAL | Age: 87
LOS: 3 days | Discharge: NURSING, REHAB OR LONG-TERM CARE | End: 2022-10-25
Attending: EMERGENCY MEDICINE

## 2022-10-22 ENCOUNTER — APPOINTMENT (OUTPATIENT)
Dept: GENERAL RADIOLOGY | Facility: HOSPITAL | Age: 87
End: 2022-10-22

## 2022-10-22 DIAGNOSIS — N39.0 URINARY TRACT INFECTION WITHOUT HEMATURIA, SITE UNSPECIFIED: Primary | ICD-10-CM

## 2022-10-22 DIAGNOSIS — Z20.822 LAB TEST NEGATIVE FOR COVID-19 VIRUS: ICD-10-CM

## 2022-10-22 DIAGNOSIS — B34.0 ADENOVIRUS POSITIVE BY PCR: ICD-10-CM

## 2022-10-22 PROBLEM — A49.9 UTI (URINARY TRACT INFECTION), BACTERIAL: Status: ACTIVE | Noted: 2022-10-22

## 2022-10-22 LAB
ANION GAP SERPL CALCULATED.3IONS-SCNC: 9 MMOL/L (ref 5–15)
ANISOCYTOSIS BLD QL: NORMAL
B PARAPERT DNA SPEC QL NAA+PROBE: NOT DETECTED
B PERT DNA SPEC QL NAA+PROBE: NOT DETECTED
BACTERIA UR QL AUTO: ABNORMAL /HPF
BACTERIA UR QL AUTO: ABNORMAL /HPF
BASOPHILS # BLD AUTO: 0.1 10*3/MM3 (ref 0–0.2)
BASOPHILS NFR BLD AUTO: 1.5 % (ref 0–1.5)
BILIRUB UR QL STRIP: NEGATIVE
BILIRUB UR QL STRIP: NEGATIVE
BUN SERPL-MCNC: 28 MG/DL (ref 8–23)
BUN/CREAT SERPL: 25.5 (ref 7–25)
C PNEUM DNA NPH QL NAA+NON-PROBE: NOT DETECTED
CALCIUM SPEC-SCNC: 8.7 MG/DL (ref 8.6–10.5)
CHLORIDE SERPL-SCNC: 107 MMOL/L (ref 98–107)
CLARITY UR: ABNORMAL
CLARITY UR: ABNORMAL
CO2 SERPL-SCNC: 26 MMOL/L (ref 22–29)
COLOR UR: YELLOW
COLOR UR: YELLOW
CREAT SERPL-MCNC: 1.1 MG/DL (ref 0.57–1)
D-LACTATE SERPL-SCNC: 2 MMOL/L (ref 0.5–2)
DEPRECATED RDW RBC AUTO: 72.6 FL (ref 37–54)
EGFRCR SERPLBLD CKD-EPI 2021: 49.3 ML/MIN/1.73
EOSINOPHIL # BLD AUTO: 0.2 10*3/MM3 (ref 0–0.4)
EOSINOPHIL NFR BLD AUTO: 4.3 % (ref 0.3–6.2)
ERYTHROCYTE [DISTWIDTH] IN BLOOD BY AUTOMATED COUNT: 22.5 % (ref 12.3–15.4)
FLUAV SUBTYP SPEC NAA+PROBE: NOT DETECTED
FLUBV RNA ISLT QL NAA+PROBE: NOT DETECTED
GLUCOSE SERPL-MCNC: 104 MG/DL (ref 65–99)
GLUCOSE UR STRIP-MCNC: NEGATIVE MG/DL
GLUCOSE UR STRIP-MCNC: NEGATIVE MG/DL
HADV DNA SPEC NAA+PROBE: DETECTED
HCOV 229E RNA SPEC QL NAA+PROBE: NOT DETECTED
HCOV HKU1 RNA SPEC QL NAA+PROBE: NOT DETECTED
HCOV NL63 RNA SPEC QL NAA+PROBE: NOT DETECTED
HCOV OC43 RNA SPEC QL NAA+PROBE: NOT DETECTED
HCT VFR BLD AUTO: 26.3 % (ref 34–46.6)
HGB BLD-MCNC: 8.2 G/DL (ref 12–15.9)
HGB UR QL STRIP.AUTO: ABNORMAL
HGB UR QL STRIP.AUTO: ABNORMAL
HMPV RNA NPH QL NAA+NON-PROBE: NOT DETECTED
HPIV1 RNA ISLT QL NAA+PROBE: NOT DETECTED
HPIV2 RNA SPEC QL NAA+PROBE: NOT DETECTED
HPIV3 RNA NPH QL NAA+PROBE: NOT DETECTED
HPIV4 P GENE NPH QL NAA+PROBE: NOT DETECTED
HYALINE CASTS UR QL AUTO: ABNORMAL /LPF
HYALINE CASTS UR QL AUTO: ABNORMAL /LPF
KETONES UR QL STRIP: NEGATIVE
KETONES UR QL STRIP: NEGATIVE
LEUKOCYTE ESTERASE UR QL STRIP.AUTO: ABNORMAL
LEUKOCYTE ESTERASE UR QL STRIP.AUTO: ABNORMAL
LYMPHOCYTES # BLD AUTO: 0.7 10*3/MM3 (ref 0.7–3.1)
LYMPHOCYTES NFR BLD AUTO: 19 % (ref 19.6–45.3)
M PNEUMO IGG SER IA-ACNC: NOT DETECTED
MCH RBC QN AUTO: 29.2 PG (ref 26.6–33)
MCHC RBC AUTO-ENTMCNC: 31.1 G/DL (ref 31.5–35.7)
MCV RBC AUTO: 94 FL (ref 79–97)
MONOCYTES # BLD AUTO: 0.3 10*3/MM3 (ref 0.1–0.9)
MONOCYTES NFR BLD AUTO: 8.3 % (ref 5–12)
NEUTROPHILS NFR BLD AUTO: 2.5 10*3/MM3 (ref 1.7–7)
NEUTROPHILS NFR BLD AUTO: 66.9 % (ref 42.7–76)
NITRITE UR QL STRIP: POSITIVE
NITRITE UR QL STRIP: POSITIVE
NRBC BLD AUTO-RTO: 0 /100 WBC (ref 0–0.2)
NT-PROBNP SERPL-MCNC: 174.9 PG/ML (ref 0–1800)
PH UR STRIP.AUTO: 6 [PH] (ref 5–8)
PH UR STRIP.AUTO: 6 [PH] (ref 5–8)
PLATELET # BLD AUTO: 203 10*3/MM3 (ref 140–450)
PMV BLD AUTO: 6.6 FL (ref 6–12)
POTASSIUM SERPL-SCNC: 4.1 MMOL/L (ref 3.5–5.2)
PROT UR QL STRIP: ABNORMAL
PROT UR QL STRIP: NEGATIVE
RBC # BLD AUTO: 2.79 10*6/MM3 (ref 3.77–5.28)
RBC # UR STRIP: ABNORMAL /HPF
RBC # UR STRIP: ABNORMAL /HPF
REF LAB TEST METHOD: ABNORMAL
REF LAB TEST METHOD: ABNORMAL
RHINOVIRUS RNA SPEC NAA+PROBE: NOT DETECTED
RSV RNA NPH QL NAA+NON-PROBE: NOT DETECTED
SARS-COV-2 RNA NPH QL NAA+NON-PROBE: NOT DETECTED
SMALL PLATELETS BLD QL SMEAR: ADEQUATE
SODIUM SERPL-SCNC: 142 MMOL/L (ref 136–145)
SP GR UR STRIP: 1.02 (ref 1–1.03)
SP GR UR STRIP: 1.02 (ref 1–1.03)
SQUAMOUS #/AREA URNS HPF: ABNORMAL /HPF
SQUAMOUS #/AREA URNS HPF: ABNORMAL /HPF
UROBILINOGEN UR QL STRIP: ABNORMAL
UROBILINOGEN UR QL STRIP: ABNORMAL
WBC # UR STRIP: ABNORMAL /HPF
WBC # UR STRIP: ABNORMAL /HPF
WBC MORPH BLD: NORMAL
WBC NRBC COR # BLD: 3.8 10*3/MM3 (ref 3.4–10.8)

## 2022-10-22 PROCEDURE — 87086 URINE CULTURE/COLONY COUNT: CPT | Performed by: NURSE PRACTITIONER

## 2022-10-22 PROCEDURE — 85007 BL SMEAR W/DIFF WBC COUNT: CPT | Performed by: NURSE PRACTITIONER

## 2022-10-22 PROCEDURE — 36415 COLL VENOUS BLD VENIPUNCTURE: CPT

## 2022-10-22 PROCEDURE — 99285 EMERGENCY DEPT VISIT HI MDM: CPT

## 2022-10-22 PROCEDURE — 71045 X-RAY EXAM CHEST 1 VIEW: CPT

## 2022-10-22 PROCEDURE — 80048 BASIC METABOLIC PNL TOTAL CA: CPT | Performed by: NURSE PRACTITIONER

## 2022-10-22 PROCEDURE — 93005 ELECTROCARDIOGRAM TRACING: CPT | Performed by: NURSE PRACTITIONER

## 2022-10-22 PROCEDURE — 83880 ASSAY OF NATRIURETIC PEPTIDE: CPT | Performed by: NURSE PRACTITIONER

## 2022-10-22 PROCEDURE — 25010000002 ONDANSETRON PER 1 MG: Performed by: INTERNAL MEDICINE

## 2022-10-22 PROCEDURE — 87186 SC STD MICRODIL/AGAR DIL: CPT | Performed by: NURSE PRACTITIONER

## 2022-10-22 PROCEDURE — 87040 BLOOD CULTURE FOR BACTERIA: CPT | Performed by: NURSE PRACTITIONER

## 2022-10-22 PROCEDURE — 83605 ASSAY OF LACTIC ACID: CPT | Performed by: INTERNAL MEDICINE

## 2022-10-22 PROCEDURE — 25010000002 CEFTRIAXONE PER 250 MG: Performed by: NURSE PRACTITIONER

## 2022-10-22 PROCEDURE — 85025 COMPLETE CBC W/AUTO DIFF WBC: CPT | Performed by: NURSE PRACTITIONER

## 2022-10-22 PROCEDURE — 0202U NFCT DS 22 TRGT SARS-COV-2: CPT | Performed by: NURSE PRACTITIONER

## 2022-10-22 PROCEDURE — P9612 CATHETERIZE FOR URINE SPEC: HCPCS

## 2022-10-22 PROCEDURE — 81001 URINALYSIS AUTO W/SCOPE: CPT | Performed by: NURSE PRACTITIONER

## 2022-10-22 PROCEDURE — 87088 URINE BACTERIA CULTURE: CPT | Performed by: NURSE PRACTITIONER

## 2022-10-22 RX ORDER — SODIUM CHLORIDE 0.9 % (FLUSH) 0.9 %
10 SYRINGE (ML) INJECTION AS NEEDED
Status: DISCONTINUED | OUTPATIENT
Start: 2022-10-22 | End: 2022-10-25 | Stop reason: HOSPADM

## 2022-10-22 RX ORDER — DULOXETIN HYDROCHLORIDE 30 MG/1
30 CAPSULE, DELAYED RELEASE ORAL DAILY
Status: DISCONTINUED | OUTPATIENT
Start: 2022-10-23 | End: 2022-10-23

## 2022-10-22 RX ORDER — ARFORMOTEROL TARTRATE 15 UG/2ML
15 SOLUTION RESPIRATORY (INHALATION)
Status: DISCONTINUED | OUTPATIENT
Start: 2022-10-23 | End: 2022-10-25 | Stop reason: HOSPADM

## 2022-10-22 RX ORDER — ZOLPIDEM TARTRATE 5 MG/1
5 TABLET ORAL NIGHTLY PRN
Status: DISCONTINUED | OUTPATIENT
Start: 2022-10-22 | End: 2022-10-23

## 2022-10-22 RX ORDER — FENTANYL 12 UG/H
1 PATCH TRANSDERMAL
Status: DISCONTINUED | OUTPATIENT
Start: 2022-10-23 | End: 2022-10-23

## 2022-10-22 RX ORDER — POTASSIUM CHLORIDE 20 MEQ/1
20 TABLET, EXTENDED RELEASE ORAL DAILY
Status: DISCONTINUED | OUTPATIENT
Start: 2022-10-23 | End: 2022-10-23

## 2022-10-22 RX ORDER — SODIUM CHLORIDE 0.9 % (FLUSH) 0.9 %
10 SYRINGE (ML) INJECTION EVERY 12 HOURS SCHEDULED
Status: DISCONTINUED | OUTPATIENT
Start: 2022-10-23 | End: 2022-10-25 | Stop reason: HOSPADM

## 2022-10-22 RX ORDER — ROPINIROLE 1 MG/1
3 TABLET, FILM COATED ORAL 3 TIMES DAILY
Status: DISCONTINUED | OUTPATIENT
Start: 2022-10-23 | End: 2022-10-23

## 2022-10-22 RX ORDER — LUBIPROSTONE 24 UG/1
24 CAPSULE ORAL 2 TIMES DAILY WITH MEALS
Status: DISCONTINUED | OUTPATIENT
Start: 2022-10-23 | End: 2022-10-25 | Stop reason: HOSPADM

## 2022-10-22 RX ORDER — POLYETHYLENE GLYCOL 3350 17 G/17G
17 POWDER, FOR SOLUTION ORAL DAILY
Status: DISCONTINUED | OUTPATIENT
Start: 2022-10-23 | End: 2022-10-25 | Stop reason: HOSPADM

## 2022-10-22 RX ORDER — IPRATROPIUM BROMIDE AND ALBUTEROL SULFATE 2.5; .5 MG/3ML; MG/3ML
3 SOLUTION RESPIRATORY (INHALATION)
Status: DISCONTINUED | OUTPATIENT
Start: 2022-10-23 | End: 2022-10-24

## 2022-10-22 RX ORDER — HYDROCODONE BITARTRATE AND ACETAMINOPHEN 10; 325 MG/1; MG/1
1 TABLET ORAL 4 TIMES DAILY
Status: DISCONTINUED | OUTPATIENT
Start: 2022-10-23 | End: 2022-10-25 | Stop reason: HOSPADM

## 2022-10-22 RX ORDER — ALBUTEROL SULFATE 2.5 MG/3ML
2.5 SOLUTION RESPIRATORY (INHALATION) EVERY 4 HOURS PRN
Status: DISCONTINUED | OUTPATIENT
Start: 2022-10-22 | End: 2022-10-25 | Stop reason: HOSPADM

## 2022-10-22 RX ORDER — ONDANSETRON 2 MG/ML
4 INJECTION INTRAMUSCULAR; INTRAVENOUS EVERY 6 HOURS PRN
Status: DISCONTINUED | OUTPATIENT
Start: 2022-10-22 | End: 2022-10-25 | Stop reason: HOSPADM

## 2022-10-22 RX ORDER — CHOLECALCIFEROL (VITAMIN D3) 125 MCG
10 CAPSULE ORAL NIGHTLY PRN
Status: DISCONTINUED | OUTPATIENT
Start: 2022-10-22 | End: 2022-10-25 | Stop reason: HOSPADM

## 2022-10-22 RX ORDER — ALLOPURINOL 100 MG/1
100 TABLET ORAL DAILY
Status: DISCONTINUED | OUTPATIENT
Start: 2022-10-23 | End: 2022-10-25 | Stop reason: HOSPADM

## 2022-10-22 RX ORDER — IPRATROPIUM BROMIDE AND ALBUTEROL SULFATE 2.5; .5 MG/3ML; MG/3ML
3 SOLUTION RESPIRATORY (INHALATION) EVERY 6 HOURS PRN
Status: DISCONTINUED | OUTPATIENT
Start: 2022-10-22 | End: 2022-10-25 | Stop reason: HOSPADM

## 2022-10-22 RX ORDER — BISACODYL 10 MG
10 SUPPOSITORY, RECTAL RECTAL AS NEEDED
Status: DISCONTINUED | OUTPATIENT
Start: 2022-10-22 | End: 2022-10-25 | Stop reason: HOSPADM

## 2022-10-22 RX ORDER — PREGABALIN 25 MG/1
50 CAPSULE ORAL 2 TIMES DAILY
Status: DISCONTINUED | OUTPATIENT
Start: 2022-10-23 | End: 2022-10-23

## 2022-10-22 RX ORDER — CYANOCOBALAMIN 1000 UG/ML
1000 INJECTION, SOLUTION INTRAMUSCULAR; SUBCUTANEOUS
Status: DISCONTINUED | OUTPATIENT
Start: 2022-10-23 | End: 2022-10-23

## 2022-10-22 RX ORDER — BACLOFEN 10 MG/1
5 TABLET ORAL 2 TIMES DAILY PRN
Status: DISCONTINUED | OUTPATIENT
Start: 2022-10-22 | End: 2022-10-23

## 2022-10-22 RX ORDER — BUDESONIDE 0.5 MG/2ML
0.5 INHALANT ORAL
Status: DISCONTINUED | OUTPATIENT
Start: 2022-10-23 | End: 2022-10-25 | Stop reason: HOSPADM

## 2022-10-22 RX ORDER — FUROSEMIDE 20 MG/1
20 TABLET ORAL DAILY
Status: DISCONTINUED | OUTPATIENT
Start: 2022-10-23 | End: 2022-10-25 | Stop reason: HOSPADM

## 2022-10-22 RX ORDER — ASCORBIC ACID 500 MG
500 TABLET ORAL DAILY
Status: DISCONTINUED | OUTPATIENT
Start: 2022-10-23 | End: 2022-10-23

## 2022-10-22 RX ORDER — HYDROCODONE BITARTRATE AND ACETAMINOPHEN 5; 325 MG/1; MG/1
1 TABLET ORAL EVERY 4 HOURS PRN
Status: DISCONTINUED | OUTPATIENT
Start: 2022-10-22 | End: 2022-10-25 | Stop reason: HOSPADM

## 2022-10-22 RX ORDER — ACETAMINOPHEN 325 MG/1
650 TABLET ORAL EVERY 8 HOURS PRN
Status: DISCONTINUED | OUTPATIENT
Start: 2022-10-22 | End: 2022-10-25 | Stop reason: HOSPADM

## 2022-10-22 RX ORDER — SIMETHICONE 80 MG
80 TABLET,CHEWABLE ORAL EVERY 6 HOURS PRN
Status: DISCONTINUED | OUTPATIENT
Start: 2022-10-22 | End: 2022-10-25 | Stop reason: HOSPADM

## 2022-10-22 RX ORDER — SENNA PLUS 8.6 MG/1
1 TABLET ORAL 2 TIMES DAILY
Status: DISCONTINUED | OUTPATIENT
Start: 2022-10-23 | End: 2022-10-25 | Stop reason: HOSPADM

## 2022-10-22 RX ORDER — GABAPENTIN 100 MG/1
100 CAPSULE ORAL 3 TIMES DAILY
Status: DISCONTINUED | OUTPATIENT
Start: 2022-10-23 | End: 2022-10-23

## 2022-10-22 RX ORDER — NITROGLYCERIN 0.4 MG/1
0.4 TABLET SUBLINGUAL
Status: DISCONTINUED | OUTPATIENT
Start: 2022-10-22 | End: 2022-10-25 | Stop reason: HOSPADM

## 2022-10-22 RX ORDER — CETIRIZINE HYDROCHLORIDE 10 MG/1
10 TABLET ORAL NIGHTLY
Status: DISCONTINUED | OUTPATIENT
Start: 2022-10-23 | End: 2022-10-25 | Stop reason: HOSPADM

## 2022-10-22 RX ORDER — NYSTATIN 100000 [USP'U]/G
1 POWDER TOPICAL AS NEEDED
Status: DISCONTINUED | OUTPATIENT
Start: 2022-10-22 | End: 2022-10-25 | Stop reason: HOSPADM

## 2022-10-22 RX ORDER — PANTOPRAZOLE SODIUM 40 MG/1
40 TABLET, DELAYED RELEASE ORAL EVERY MORNING
Status: DISCONTINUED | OUTPATIENT
Start: 2022-10-23 | End: 2022-10-23

## 2022-10-22 RX ORDER — SODIUM CHLORIDE 9 MG/ML
50 INJECTION, SOLUTION INTRAVENOUS CONTINUOUS
Status: DISPENSED | OUTPATIENT
Start: 2022-10-23 | End: 2022-10-24

## 2022-10-22 RX ORDER — FERROUS SULFATE TAB EC 324 MG (65 MG FE EQUIVALENT) 324 (65 FE) MG
324 TABLET DELAYED RESPONSE ORAL
Status: DISCONTINUED | OUTPATIENT
Start: 2022-10-23 | End: 2022-10-25 | Stop reason: HOSPADM

## 2022-10-22 RX ORDER — ARIPIPRAZOLE 2 MG/1
2 TABLET ORAL NIGHTLY
Status: DISCONTINUED | OUTPATIENT
Start: 2022-10-23 | End: 2022-10-23

## 2022-10-22 RX ORDER — ERGOCALCIFEROL 1.25 MG/1
50000 CAPSULE ORAL WEEKLY
Status: DISCONTINUED | OUTPATIENT
Start: 2022-10-23 | End: 2022-10-23

## 2022-10-22 RX ADMIN — HYDROCODONE BITARTRATE AND ACETAMINOPHEN 1 TABLET: 5; 325 TABLET ORAL at 21:10

## 2022-10-22 RX ADMIN — ONDANSETRON 4 MG: 2 INJECTION INTRAMUSCULAR; INTRAVENOUS at 23:45

## 2022-10-22 RX ADMIN — CEFTRIAXONE 2 G: 2 INJECTION, POWDER, FOR SOLUTION INTRAMUSCULAR; INTRAVENOUS at 14:51

## 2022-10-23 LAB
ALBUMIN SERPL-MCNC: 3.1 G/DL (ref 3.5–5.2)
ALBUMIN/GLOB SERPL: 0.9 G/DL
ALP SERPL-CCNC: 58 U/L (ref 39–117)
ALT SERPL W P-5'-P-CCNC: 6 U/L (ref 1–33)
ANION GAP SERPL CALCULATED.3IONS-SCNC: 7 MMOL/L (ref 5–15)
AST SERPL-CCNC: 12 U/L (ref 1–32)
BILIRUB SERPL-MCNC: 0.2 MG/DL (ref 0–1.2)
BUN SERPL-MCNC: 30 MG/DL (ref 8–23)
BUN/CREAT SERPL: 25.6 (ref 7–25)
CA-I SERPL ISE-MCNC: 1.2 MMOL/L (ref 1.2–1.3)
CALCIUM SPEC-SCNC: 8.4 MG/DL (ref 8.6–10.5)
CHLORIDE SERPL-SCNC: 109 MMOL/L (ref 98–107)
CHOLEST SERPL-MCNC: 106 MG/DL (ref 0–200)
CK SERPL-CCNC: 30 U/L (ref 20–180)
CO2 SERPL-SCNC: 26 MMOL/L (ref 22–29)
CREAT SERPL-MCNC: 1.17 MG/DL (ref 0.57–1)
EGFRCR SERPLBLD CKD-EPI 2021: 45.8 ML/MIN/1.73
GLOBULIN UR ELPH-MCNC: 3.5 GM/DL
GLUCOSE SERPL-MCNC: 88 MG/DL (ref 65–99)
HDLC SERPL-MCNC: 32 MG/DL (ref 40–60)
IRON 24H UR-MRATE: 57 MCG/DL (ref 37–145)
IRON SATN MFR SERPL: 13 % (ref 20–50)
LDLC SERPL CALC-MCNC: 52 MG/DL (ref 0–100)
LDLC/HDLC SERPL: 1.54 {RATIO}
MAGNESIUM SERPL-MCNC: 2.1 MG/DL (ref 1.6–2.4)
MAGNESIUM SERPL-MCNC: 2.2 MG/DL (ref 1.6–2.4)
PHOSPHATE SERPL-MCNC: 3.9 MG/DL (ref 2.5–4.5)
POTASSIUM SERPL-SCNC: 4.8 MMOL/L (ref 3.5–5.2)
PROCALCITONIN SERPL-MCNC: 0.1 NG/ML (ref 0–0.25)
PROT SERPL-MCNC: 6.6 G/DL (ref 6–8.5)
QT INTERVAL: 389 MS
SODIUM SERPL-SCNC: 142 MMOL/L (ref 136–145)
TIBC SERPL-MCNC: 426 MCG/DL (ref 298–536)
TRANSFERRIN SERPL-MCNC: 286 MG/DL (ref 200–360)
TRIGL SERPL-MCNC: 124 MG/DL (ref 0–150)
TSH SERPL DL<=0.05 MIU/L-ACNC: 2.47 UIU/ML (ref 0.27–4.2)
VLDLC SERPL-MCNC: 22 MG/DL (ref 5–40)

## 2022-10-23 PROCEDURE — 94761 N-INVAS EAR/PLS OXIMETRY MLT: CPT

## 2022-10-23 PROCEDURE — 84443 ASSAY THYROID STIM HORMONE: CPT | Performed by: INTERNAL MEDICINE

## 2022-10-23 PROCEDURE — 80053 COMPREHEN METABOLIC PANEL: CPT | Performed by: INTERNAL MEDICINE

## 2022-10-23 PROCEDURE — 83540 ASSAY OF IRON: CPT | Performed by: INTERNAL MEDICINE

## 2022-10-23 PROCEDURE — 80061 LIPID PANEL: CPT | Performed by: INTERNAL MEDICINE

## 2022-10-23 PROCEDURE — 25010000002 CEFTRIAXONE PER 250 MG: Performed by: INTERNAL MEDICINE

## 2022-10-23 PROCEDURE — 97161 PT EVAL LOW COMPLEX 20 MIN: CPT

## 2022-10-23 PROCEDURE — 82550 ASSAY OF CK (CPK): CPT | Performed by: INTERNAL MEDICINE

## 2022-10-23 PROCEDURE — 94799 UNLISTED PULMONARY SVC/PX: CPT

## 2022-10-23 PROCEDURE — 94640 AIRWAY INHALATION TREATMENT: CPT

## 2022-10-23 PROCEDURE — 83735 ASSAY OF MAGNESIUM: CPT | Performed by: INTERNAL MEDICINE

## 2022-10-23 PROCEDURE — 84145 PROCALCITONIN (PCT): CPT | Performed by: INTERNAL MEDICINE

## 2022-10-23 PROCEDURE — 84100 ASSAY OF PHOSPHORUS: CPT | Performed by: INTERNAL MEDICINE

## 2022-10-23 PROCEDURE — 82330 ASSAY OF CALCIUM: CPT | Performed by: INTERNAL MEDICINE

## 2022-10-23 PROCEDURE — 84466 ASSAY OF TRANSFERRIN: CPT | Performed by: INTERNAL MEDICINE

## 2022-10-23 PROCEDURE — 83036 HEMOGLOBIN GLYCOSYLATED A1C: CPT | Performed by: INTERNAL MEDICINE

## 2022-10-23 PROCEDURE — 94664 DEMO&/EVAL PT USE INHALER: CPT

## 2022-10-23 RX ORDER — ROPINIROLE 1 MG/1
3 TABLET, FILM COATED ORAL 2 TIMES DAILY
Status: DISCONTINUED | OUTPATIENT
Start: 2022-10-23 | End: 2022-10-25 | Stop reason: HOSPADM

## 2022-10-23 RX ORDER — DULOXETIN HYDROCHLORIDE 30 MG/1
60 CAPSULE, DELAYED RELEASE ORAL NIGHTLY
Status: DISCONTINUED | OUTPATIENT
Start: 2022-10-23 | End: 2022-10-25 | Stop reason: HOSPADM

## 2022-10-23 RX ORDER — FENTANYL 25 UG/H
1 PATCH TRANSDERMAL
Status: DISCONTINUED | OUTPATIENT
Start: 2022-10-23 | End: 2022-10-25 | Stop reason: HOSPADM

## 2022-10-23 RX ORDER — GABAPENTIN 100 MG/1
200 CAPSULE ORAL 3 TIMES DAILY
Status: DISCONTINUED | OUTPATIENT
Start: 2022-10-23 | End: 2022-10-25 | Stop reason: HOSPADM

## 2022-10-23 RX ORDER — BENZONATATE 100 MG/1
100 CAPSULE ORAL 3 TIMES DAILY PRN
COMMUNITY

## 2022-10-23 RX ORDER — ROPINIROLE 1 MG/1
3 TABLET, FILM COATED ORAL 2 TIMES DAILY
Status: DISCONTINUED | OUTPATIENT
Start: 2022-10-24 | End: 2022-10-23

## 2022-10-23 RX ORDER — ROPINIROLE 3 MG/1
6 TABLET, FILM COATED ORAL
COMMUNITY

## 2022-10-23 RX ORDER — UREA 10 %
140 LOTION (ML) TOPICAL
COMMUNITY

## 2022-10-23 RX ORDER — PANTOPRAZOLE SODIUM 40 MG/1
40 TABLET, DELAYED RELEASE ORAL
Status: DISCONTINUED | OUTPATIENT
Start: 2022-10-23 | End: 2022-10-25 | Stop reason: HOSPADM

## 2022-10-23 RX ORDER — POTASSIUM CHLORIDE 750 MG/1
10 TABLET, FILM COATED, EXTENDED RELEASE ORAL DAILY
Status: DISCONTINUED | OUTPATIENT
Start: 2022-10-23 | End: 2022-10-25 | Stop reason: HOSPADM

## 2022-10-23 RX ORDER — ROPINIROLE 1 MG/1
3 TABLET, FILM COATED ORAL EVERY 8 HOURS SCHEDULED
Status: DISCONTINUED | OUTPATIENT
Start: 2022-10-23 | End: 2022-10-23

## 2022-10-23 RX ADMIN — ROPINIROLE 6 MG: 5 TABLET, FILM COATED ORAL at 21:00

## 2022-10-23 RX ADMIN — HYDROCODONE BITARTRATE AND ACETAMINOPHEN 1 TABLET: 5; 325 TABLET ORAL at 10:45

## 2022-10-23 RX ADMIN — GABAPENTIN 200 MG: 100 CAPSULE ORAL at 21:00

## 2022-10-23 RX ADMIN — ROPINIROLE HYDROCHLORIDE 3 MG: 1 TABLET, FILM COATED ORAL at 16:36

## 2022-10-23 RX ADMIN — LUBIPROSTONE 24 MCG: 24 CAPSULE, GELATIN COATED ORAL at 12:34

## 2022-10-23 RX ADMIN — LEVOTHYROXINE SODIUM 137 MCG: 0.11 TABLET ORAL at 12:35

## 2022-10-23 RX ADMIN — GABAPENTIN 200 MG: 100 CAPSULE ORAL at 16:46

## 2022-10-23 RX ADMIN — CETIRIZINE HYDROCHLORIDE 10 MG: 10 TABLET, FILM COATED ORAL at 21:00

## 2022-10-23 RX ADMIN — SENNOSIDES 1 TABLET: 8.6 TABLET, FILM COATED ORAL at 12:35

## 2022-10-23 RX ADMIN — Medication 10 ML: at 21:00

## 2022-10-23 RX ADMIN — DULOXETINE 60 MG: 30 CAPSULE, DELAYED RELEASE ORAL at 21:00

## 2022-10-23 RX ADMIN — FUROSEMIDE 20 MG: 20 TABLET ORAL at 12:35

## 2022-10-23 RX ADMIN — HYDROCODONE BITARTRATE AND ACETAMINOPHEN 1 TABLET: 10; 325 TABLET ORAL at 21:00

## 2022-10-23 RX ADMIN — POTASSIUM CHLORIDE 10 MEQ: 750 TABLET, EXTENDED RELEASE ORAL at 12:33

## 2022-10-23 RX ADMIN — FENTANYL 1 PATCH: 25 PATCH, EXTENDED RELEASE TRANSDERMAL at 12:31

## 2022-10-23 RX ADMIN — DICLOFENAC SODIUM 2 G: 10 GEL TOPICAL at 14:58

## 2022-10-23 RX ADMIN — CARBIDOPA AND LEVODOPA 2 TABLET: 25; 100 TABLET ORAL at 12:35

## 2022-10-23 RX ADMIN — HYDROCODONE BITARTRATE AND ACETAMINOPHEN 1 TABLET: 10; 325 TABLET ORAL at 14:57

## 2022-10-23 RX ADMIN — POLYETHYLENE GLYCOL 3350 17 G: 17 POWDER, FOR SOLUTION ORAL at 12:32

## 2022-10-23 RX ADMIN — HYDROCODONE BITARTRATE AND ACETAMINOPHEN 1 TABLET: 10; 325 TABLET ORAL at 17:58

## 2022-10-23 RX ADMIN — Medication 10 ML: at 10:46

## 2022-10-23 RX ADMIN — IPRATROPIUM BROMIDE AND ALBUTEROL SULFATE 3 ML: .5; 3 SOLUTION RESPIRATORY (INHALATION) at 15:36

## 2022-10-23 RX ADMIN — BUDESONIDE 0.5 MG: 0.5 INHALANT RESPIRATORY (INHALATION) at 21:36

## 2022-10-23 RX ADMIN — ARFORMOTEROL TARTRATE 15 MCG: 15 SOLUTION RESPIRATORY (INHALATION) at 21:36

## 2022-10-23 RX ADMIN — PANTOPRAZOLE SODIUM 40 MG: 40 TABLET, DELAYED RELEASE ORAL at 16:36

## 2022-10-23 RX ADMIN — ALLOPURINOL 100 MG: 100 TABLET ORAL at 12:32

## 2022-10-23 RX ADMIN — CEFTRIAXONE 2 G: 2 INJECTION, POWDER, FOR SOLUTION INTRAMUSCULAR; INTRAVENOUS at 14:59

## 2022-10-23 RX ADMIN — CARBIDOPA AND LEVODOPA 2 TABLET: 25; 100 TABLET ORAL at 21:00

## 2022-10-23 RX ADMIN — SENNOSIDES 1 TABLET: 8.6 TABLET, FILM COATED ORAL at 21:00

## 2022-10-23 RX ADMIN — DICLOFENAC SODIUM 2 G: 10 GEL TOPICAL at 21:02

## 2022-10-23 RX ADMIN — Medication 400 MG: at 12:35

## 2022-10-24 ENCOUNTER — APPOINTMENT (OUTPATIENT)
Dept: CT IMAGING | Facility: HOSPITAL | Age: 87
End: 2022-10-24

## 2022-10-24 LAB
BACTERIA SPEC AEROBE CULT: ABNORMAL
FOLATE SERPL-MCNC: 6.77 NG/ML (ref 4.78–24.2)
HBA1C MFR BLD: 4.1 % (ref 3.5–5.6)
MAGNESIUM SERPL-MCNC: 2 MG/DL (ref 1.6–2.4)
VIT B12 BLD-MCNC: 693 PG/ML (ref 211–946)

## 2022-10-24 PROCEDURE — 25010000002 HEPARIN LOCK FLUSH PER 10 UNITS: Performed by: INTERNAL MEDICINE

## 2022-10-24 PROCEDURE — 82746 ASSAY OF FOLIC ACID SERUM: CPT | Performed by: INTERNAL MEDICINE

## 2022-10-24 PROCEDURE — 74176 CT ABD & PELVIS W/O CONTRAST: CPT

## 2022-10-24 PROCEDURE — 83735 ASSAY OF MAGNESIUM: CPT | Performed by: INTERNAL MEDICINE

## 2022-10-24 PROCEDURE — 94799 UNLISTED PULMONARY SVC/PX: CPT

## 2022-10-24 PROCEDURE — 94761 N-INVAS EAR/PLS OXIMETRY MLT: CPT

## 2022-10-24 PROCEDURE — 25010000002 CEFTRIAXONE PER 250 MG: Performed by: INTERNAL MEDICINE

## 2022-10-24 PROCEDURE — 94664 DEMO&/EVAL PT USE INHALER: CPT

## 2022-10-24 PROCEDURE — 82607 VITAMIN B-12: CPT | Performed by: INTERNAL MEDICINE

## 2022-10-24 RX ORDER — HEPARIN SODIUM (PORCINE) LOCK FLUSH IV SOLN 100 UNIT/ML 100 UNIT/ML
500 SOLUTION INTRAVENOUS ONCE
Status: COMPLETED | OUTPATIENT
Start: 2022-10-24 | End: 2022-10-24

## 2022-10-24 RX ADMIN — CARBIDOPA AND LEVODOPA 2 TABLET: 25; 100 TABLET ORAL at 17:38

## 2022-10-24 RX ADMIN — CARBIDOPA AND LEVODOPA 2 TABLET: 25; 100 TABLET ORAL at 21:03

## 2022-10-24 RX ADMIN — FERROUS SULFATE TAB EC 324 MG (65 MG FE EQUIVALENT) 324 MG: 324 (65 FE) TABLET DELAYED RESPONSE at 09:37

## 2022-10-24 RX ADMIN — DULOXETINE 60 MG: 30 CAPSULE, DELAYED RELEASE ORAL at 21:03

## 2022-10-24 RX ADMIN — HYDROCODONE BITARTRATE AND ACETAMINOPHEN 1 TABLET: 10; 325 TABLET ORAL at 17:38

## 2022-10-24 RX ADMIN — GABAPENTIN 200 MG: 100 CAPSULE ORAL at 21:03

## 2022-10-24 RX ADMIN — ARFORMOTEROL TARTRATE 15 MCG: 15 SOLUTION RESPIRATORY (INHALATION) at 19:51

## 2022-10-24 RX ADMIN — FUROSEMIDE 20 MG: 20 TABLET ORAL at 09:37

## 2022-10-24 RX ADMIN — ARFORMOTEROL TARTRATE 15 MCG: 15 SOLUTION RESPIRATORY (INHALATION) at 07:30

## 2022-10-24 RX ADMIN — PANTOPRAZOLE SODIUM 40 MG: 40 TABLET, DELAYED RELEASE ORAL at 09:37

## 2022-10-24 RX ADMIN — ROPINIROLE 6 MG: 5 TABLET, FILM COATED ORAL at 21:03

## 2022-10-24 RX ADMIN — Medication 10 ML: at 21:02

## 2022-10-24 RX ADMIN — ROPINIROLE HYDROCHLORIDE 3 MG: 1 TABLET, FILM COATED ORAL at 17:38

## 2022-10-24 RX ADMIN — PANTOPRAZOLE SODIUM 40 MG: 40 TABLET, DELAYED RELEASE ORAL at 17:38

## 2022-10-24 RX ADMIN — BUDESONIDE 0.5 MG: 0.5 INHALANT RESPIRATORY (INHALATION) at 07:30

## 2022-10-24 RX ADMIN — HYDROCODONE BITARTRATE AND ACETAMINOPHEN 1 TABLET: 10; 325 TABLET ORAL at 21:03

## 2022-10-24 RX ADMIN — ROPINIROLE HYDROCHLORIDE 3 MG: 1 TABLET, FILM COATED ORAL at 09:37

## 2022-10-24 RX ADMIN — Medication 10 ML: at 09:37

## 2022-10-24 RX ADMIN — GABAPENTIN 200 MG: 100 CAPSULE ORAL at 09:37

## 2022-10-24 RX ADMIN — GABAPENTIN 200 MG: 100 CAPSULE ORAL at 17:38

## 2022-10-24 RX ADMIN — SENNOSIDES 1 TABLET: 8.6 TABLET, FILM COATED ORAL at 09:37

## 2022-10-24 RX ADMIN — POTASSIUM CHLORIDE 10 MEQ: 750 TABLET, EXTENDED RELEASE ORAL at 09:37

## 2022-10-24 RX ADMIN — SENNOSIDES 1 TABLET: 8.6 TABLET, FILM COATED ORAL at 21:02

## 2022-10-24 RX ADMIN — HYDROCODONE BITARTRATE AND ACETAMINOPHEN 1 TABLET: 5; 325 TABLET ORAL at 05:35

## 2022-10-24 RX ADMIN — LUBIPROSTONE 24 MCG: 24 CAPSULE, GELATIN COATED ORAL at 17:38

## 2022-10-24 RX ADMIN — HYDROCODONE BITARTRATE AND ACETAMINOPHEN 1 TABLET: 5; 325 TABLET ORAL at 00:16

## 2022-10-24 RX ADMIN — Medication 500 UNITS: at 18:18

## 2022-10-24 RX ADMIN — CETIRIZINE HYDROCHLORIDE 10 MG: 10 TABLET, FILM COATED ORAL at 21:02

## 2022-10-24 RX ADMIN — DICLOFENAC SODIUM 2 G: 10 GEL TOPICAL at 09:38

## 2022-10-24 RX ADMIN — CEFTRIAXONE 2 G: 2 INJECTION, POWDER, FOR SOLUTION INTRAMUSCULAR; INTRAVENOUS at 14:06

## 2022-10-24 RX ADMIN — DICLOFENAC SODIUM 2 G: 10 GEL TOPICAL at 21:03

## 2022-10-24 RX ADMIN — ALLOPURINOL 100 MG: 100 TABLET ORAL at 09:37

## 2022-10-24 RX ADMIN — LEVOTHYROXINE SODIUM 137 MCG: 0.11 TABLET ORAL at 05:35

## 2022-10-24 RX ADMIN — POLYETHYLENE GLYCOL 3350 17 G: 17 POWDER, FOR SOLUTION ORAL at 09:37

## 2022-10-24 RX ADMIN — CARBIDOPA AND LEVODOPA 2 TABLET: 25; 100 TABLET ORAL at 09:37

## 2022-10-24 RX ADMIN — HYDROCODONE BITARTRATE AND ACETAMINOPHEN 1 TABLET: 10; 325 TABLET ORAL at 09:37

## 2022-10-24 RX ADMIN — HYDROCODONE BITARTRATE AND ACETAMINOPHEN 1 TABLET: 10; 325 TABLET ORAL at 12:30

## 2022-10-24 RX ADMIN — LUBIPROSTONE 24 MCG: 24 CAPSULE, GELATIN COATED ORAL at 09:37

## 2022-10-24 RX ADMIN — BUDESONIDE 0.5 MG: 0.5 INHALANT RESPIRATORY (INHALATION) at 19:51

## 2022-10-25 VITALS
SYSTOLIC BLOOD PRESSURE: 126 MMHG | RESPIRATION RATE: 20 BRPM | HEART RATE: 80 BPM | OXYGEN SATURATION: 94 % | DIASTOLIC BLOOD PRESSURE: 74 MMHG | HEIGHT: 60 IN | TEMPERATURE: 97.6 F | WEIGHT: 211.42 LBS | BODY MASS INDEX: 41.51 KG/M2

## 2022-10-25 RX ORDER — IPRATROPIUM BROMIDE AND ALBUTEROL SULFATE 2.5; .5 MG/3ML; MG/3ML
3 SOLUTION RESPIRATORY (INHALATION) EVERY 6 HOURS PRN
Qty: 360 ML | Refills: 0 | Status: SHIPPED | OUTPATIENT
Start: 2022-10-25

## 2022-10-25 RX ORDER — CEFDINIR 300 MG/1
300 CAPSULE ORAL EVERY 12 HOURS SCHEDULED
Qty: 8 CAPSULE | Refills: 0 | Status: SHIPPED | OUTPATIENT
Start: 2022-10-25 | End: 2022-10-29

## 2022-10-25 RX ORDER — FERROUS SULFATE TAB EC 324 MG (65 MG FE EQUIVALENT) 324 (65 FE) MG
324 TABLET DELAYED RESPONSE ORAL
Qty: 30 TABLET | Refills: 2 | Status: SHIPPED | OUTPATIENT
Start: 2022-10-25

## 2022-10-25 RX ORDER — CEFDINIR 300 MG/1
300 CAPSULE ORAL EVERY 12 HOURS SCHEDULED
Status: DISCONTINUED | OUTPATIENT
Start: 2022-10-25 | End: 2022-10-25 | Stop reason: HOSPADM

## 2022-10-25 RX ADMIN — PANTOPRAZOLE SODIUM 40 MG: 40 TABLET, DELAYED RELEASE ORAL at 09:21

## 2022-10-25 RX ADMIN — FERROUS SULFATE TAB EC 324 MG (65 MG FE EQUIVALENT) 324 MG: 324 (65 FE) TABLET DELAYED RESPONSE at 09:22

## 2022-10-25 RX ADMIN — DICLOFENAC SODIUM 2 G: 10 GEL TOPICAL at 09:22

## 2022-10-25 RX ADMIN — Medication 10 ML: at 09:23

## 2022-10-25 RX ADMIN — LUBIPROSTONE 24 MCG: 24 CAPSULE, GELATIN COATED ORAL at 09:22

## 2022-10-25 RX ADMIN — POLYETHYLENE GLYCOL 3350 17 G: 17 POWDER, FOR SOLUTION ORAL at 09:22

## 2022-10-25 RX ADMIN — HYDROCODONE BITARTRATE AND ACETAMINOPHEN 1 TABLET: 5; 325 TABLET ORAL at 05:38

## 2022-10-25 RX ADMIN — ROPINIROLE HYDROCHLORIDE 3 MG: 1 TABLET, FILM COATED ORAL at 09:50

## 2022-10-25 RX ADMIN — POTASSIUM CHLORIDE 10 MEQ: 750 TABLET, EXTENDED RELEASE ORAL at 09:21

## 2022-10-25 RX ADMIN — SENNOSIDES 1 TABLET: 8.6 TABLET, FILM COATED ORAL at 09:21

## 2022-10-25 RX ADMIN — LEVOTHYROXINE SODIUM 137 MCG: 0.11 TABLET ORAL at 05:38

## 2022-10-25 RX ADMIN — ALLOPURINOL 100 MG: 100 TABLET ORAL at 09:21

## 2022-10-25 RX ADMIN — HYDROCODONE BITARTRATE AND ACETAMINOPHEN 1 TABLET: 10; 325 TABLET ORAL at 09:21

## 2022-10-25 RX ADMIN — GABAPENTIN 200 MG: 100 CAPSULE ORAL at 09:21

## 2022-10-25 RX ADMIN — FUROSEMIDE 20 MG: 20 TABLET ORAL at 09:22

## 2022-10-25 RX ADMIN — CARBIDOPA AND LEVODOPA 2 TABLET: 25; 100 TABLET ORAL at 09:21

## 2022-10-25 RX ADMIN — Medication 400 MG: at 09:21

## 2022-10-27 LAB
BACTERIA SPEC AEROBE CULT: NORMAL
BACTERIA SPEC AEROBE CULT: NORMAL

## 2022-11-30 ENCOUNTER — HOSPITAL ENCOUNTER (OUTPATIENT)
Dept: INFUSION THERAPY | Facility: HOSPITAL | Age: 87
Setting detail: INFUSION SERIES
Discharge: HOME | End: 2022-11-30

## 2022-11-30 VITALS
TEMPERATURE: 98.1 F | RESPIRATION RATE: 20 BRPM | DIASTOLIC BLOOD PRESSURE: 60 MMHG | OXYGEN SATURATION: 96 % | HEART RATE: 90 BPM | SYSTOLIC BLOOD PRESSURE: 119 MMHG

## 2022-11-30 DIAGNOSIS — D46.9 MDS (MYELODYSPLASTIC SYNDROME): Primary | ICD-10-CM

## 2022-11-30 DIAGNOSIS — D64.9 ANEMIA, UNSPECIFIED TYPE: ICD-10-CM

## 2022-11-30 DIAGNOSIS — D64.9 SEVERE ANEMIA: ICD-10-CM

## 2022-11-30 DIAGNOSIS — D46.9 MDS (MYELODYSPLASTIC SYNDROME): ICD-10-CM

## 2022-11-30 DIAGNOSIS — Z45.2 ENCOUNTER FOR CARE RELATED TO VASCULAR ACCESS PORT: Primary | ICD-10-CM

## 2022-11-30 LAB
ABO GROUP BLD: NORMAL
BASOPHILS # BLD AUTO: 0 10*3/MM3 (ref 0–0.2)
BASOPHILS NFR BLD AUTO: 1.2 % (ref 0–1.5)
BB HOLD TUBE: NORMAL
BLD GP AB SCN SERPL QL: NEGATIVE
DEPRECATED RDW RBC AUTO: 59.9 FL (ref 37–54)
EOSINOPHIL # BLD AUTO: 0.2 10*3/MM3 (ref 0–0.4)
EOSINOPHIL NFR BLD AUTO: 6.9 % (ref 0.3–6.2)
ERYTHROCYTE [DISTWIDTH] IN BLOOD BY AUTOMATED COUNT: 16.7 % (ref 12.3–15.4)
HCT VFR BLD AUTO: 22.6 % (ref 34–46.6)
HCT VFR BLD AUTO: 24.8 % (ref 34–46.6)
HCT VFR BLD AUTO: 26.9 % (ref 34–46.6)
HGB BLD-MCNC: 7.4 G/DL (ref 12–15.9)
HGB BLD-MCNC: 8.2 G/DL (ref 12–15.9)
HGB BLD-MCNC: 9 G/DL (ref 12–15.9)
LYMPHOCYTES # BLD AUTO: 0.7 10*3/MM3 (ref 0.7–3.1)
LYMPHOCYTES NFR BLD AUTO: 22.5 % (ref 19.6–45.3)
MCH RBC QN AUTO: 31 PG (ref 26.6–33)
MCHC RBC AUTO-ENTMCNC: 32.7 G/DL (ref 31.5–35.7)
MCV RBC AUTO: 94.8 FL (ref 79–97)
MONOCYTES # BLD AUTO: 0.3 10*3/MM3 (ref 0.1–0.9)
MONOCYTES NFR BLD AUTO: 9.4 % (ref 5–12)
NEUTROPHILS NFR BLD AUTO: 1.8 10*3/MM3 (ref 1.7–7)
NEUTROPHILS NFR BLD AUTO: 60 % (ref 42.7–76)
NRBC BLD AUTO-RTO: 1 /100 WBC (ref 0–0.2)
PLATELET # BLD AUTO: 217 10*3/MM3 (ref 140–450)
PMV BLD AUTO: 6.5 FL (ref 6–12)
RBC # BLD AUTO: 2.39 10*6/MM3 (ref 3.77–5.28)
RH BLD: POSITIVE
T&S EXPIRATION DATE: NORMAL
WBC NRBC COR # BLD: 3 10*3/MM3 (ref 3.4–10.8)

## 2022-11-30 PROCEDURE — 86850 RBC ANTIBODY SCREEN: CPT | Performed by: NURSE PRACTITIONER

## 2022-11-30 PROCEDURE — 86900 BLOOD TYPING SEROLOGIC ABO: CPT

## 2022-11-30 PROCEDURE — 85014 HEMATOCRIT: CPT

## 2022-11-30 PROCEDURE — P9016 RBC LEUKOCYTES REDUCED: HCPCS

## 2022-11-30 PROCEDURE — 86901 BLOOD TYPING SEROLOGIC RH(D): CPT | Performed by: NURSE PRACTITIONER

## 2022-11-30 PROCEDURE — 85025 COMPLETE CBC W/AUTO DIFF WBC: CPT | Performed by: INTERNAL MEDICINE

## 2022-11-30 PROCEDURE — 85018 HEMOGLOBIN: CPT

## 2022-11-30 PROCEDURE — 96374 THER/PROPH/DIAG INJ IV PUSH: CPT

## 2022-11-30 PROCEDURE — 86900 BLOOD TYPING SEROLOGIC ABO: CPT | Performed by: NURSE PRACTITIONER

## 2022-11-30 PROCEDURE — 36591 DRAW BLOOD OFF VENOUS DEVICE: CPT

## 2022-11-30 PROCEDURE — 85018 HEMOGLOBIN: CPT | Performed by: NURSE PRACTITIONER

## 2022-11-30 PROCEDURE — 36430 TRANSFUSION BLD/BLD COMPNT: CPT

## 2022-11-30 PROCEDURE — 85014 HEMATOCRIT: CPT | Performed by: NURSE PRACTITIONER

## 2022-11-30 PROCEDURE — 86923 COMPATIBILITY TEST ELECTRIC: CPT

## 2022-11-30 PROCEDURE — 25010000002 HEPARIN LOCK FLUSH PER 10 UNITS: Performed by: INTERNAL MEDICINE

## 2022-11-30 RX ORDER — SODIUM CHLORIDE 0.9 % (FLUSH) 0.9 %
20 SYRINGE (ML) INJECTION AS NEEDED
Status: DISCONTINUED | OUTPATIENT
Start: 2022-11-30 | End: 2022-12-02 | Stop reason: HOSPADM

## 2022-11-30 RX ORDER — SODIUM CHLORIDE 0.9 % (FLUSH) 0.9 %
20 SYRINGE (ML) INJECTION AS NEEDED
OUTPATIENT
Start: 2022-11-30

## 2022-11-30 RX ORDER — SODIUM CHLORIDE 9 MG/ML
250 INJECTION, SOLUTION INTRAVENOUS AS NEEDED
Status: DISCONTINUED | OUTPATIENT
Start: 2022-11-30 | End: 2022-12-02 | Stop reason: HOSPADM

## 2022-11-30 RX ORDER — SODIUM CHLORIDE 0.9 % (FLUSH) 0.9 %
10 SYRINGE (ML) INJECTION AS NEEDED
OUTPATIENT
Start: 2022-11-30

## 2022-11-30 RX ORDER — HEPARIN SODIUM (PORCINE) LOCK FLUSH IV SOLN 100 UNIT/ML 100 UNIT/ML
500 SOLUTION INTRAVENOUS AS NEEDED
OUTPATIENT
Start: 2022-11-30

## 2022-11-30 RX ORDER — HEPARIN SODIUM (PORCINE) LOCK FLUSH IV SOLN 100 UNIT/ML 100 UNIT/ML
500 SOLUTION INTRAVENOUS AS NEEDED
Status: DISCONTINUED | OUTPATIENT
Start: 2022-11-30 | End: 2022-12-02 | Stop reason: HOSPADM

## 2022-11-30 RX ADMIN — Medication 20 ML: at 17:47

## 2022-11-30 RX ADMIN — Medication 500 UNITS: at 17:47

## 2022-12-02 LAB
BH BB BLOOD EXPIRATION DATE: NORMAL
BH BB BLOOD EXPIRATION DATE: NORMAL
BH BB BLOOD TYPE BARCODE: 6200
BH BB BLOOD TYPE BARCODE: 6200
BH BB DISPENSE STATUS: NORMAL
BH BB DISPENSE STATUS: NORMAL
BH BB PRODUCT CODE: NORMAL
BH BB PRODUCT CODE: NORMAL
BH BB UNIT NUMBER: NORMAL
BH BB UNIT NUMBER: NORMAL
CROSSMATCH INTERPRETATION: NORMAL
CROSSMATCH INTERPRETATION: NORMAL
UNIT  ABO: NORMAL
UNIT  ABO: NORMAL
UNIT  RH: NORMAL
UNIT  RH: NORMAL

## 2023-03-08 DIAGNOSIS — D61.818 OTHER PANCYTOPENIA: Primary | ICD-10-CM

## 2023-03-08 LAB
BASOPHILS # BLD AUTO: 0 10*3/MM3 (ref 0–0.2)
BASOPHILS NFR BLD AUTO: 0.5 % (ref 0–1.5)
DEPRECATED RDW RBC AUTO: 54.3 FL (ref 37–54)
EOSINOPHIL # BLD AUTO: 0.1 10*3/MM3 (ref 0–0.4)
EOSINOPHIL NFR BLD AUTO: 3.7 % (ref 0.3–6.2)
ERYTHROCYTE [DISTWIDTH] IN BLOOD BY AUTOMATED COUNT: 16.1 % (ref 12.3–15.4)
HCT VFR BLD AUTO: 28.4 % (ref 34–46.6)
HGB BLD-MCNC: 9.6 G/DL (ref 12–15.9)
LYMPHOCYTES # BLD AUTO: 0.5 10*3/MM3 (ref 0.7–3.1)
LYMPHOCYTES NFR BLD AUTO: 15.2 % (ref 19.6–45.3)
MCH RBC QN AUTO: 30.8 PG (ref 26.6–33)
MCHC RBC AUTO-ENTMCNC: 33.8 G/DL (ref 31.5–35.7)
MCV RBC AUTO: 91.2 FL (ref 79–97)
MONOCYTES # BLD AUTO: 0.3 10*3/MM3 (ref 0.1–0.9)
MONOCYTES NFR BLD AUTO: 10.1 % (ref 5–12)
NEUTROPHILS NFR BLD AUTO: 2.4 10*3/MM3 (ref 1.7–7)
NEUTROPHILS NFR BLD AUTO: 70.5 % (ref 42.7–76)
NRBC BLD AUTO-RTO: 0.2 /100 WBC (ref 0–0.2)
PLATELET # BLD AUTO: 159 10*3/MM3 (ref 140–450)
PMV BLD AUTO: 7.7 FL (ref 6–12)
RBC # BLD AUTO: 3.12 10*6/MM3 (ref 3.77–5.28)
WBC NRBC COR # BLD: 3.4 10*3/MM3 (ref 3.4–10.8)

## 2023-03-08 PROCEDURE — 85025 COMPLETE CBC W/AUTO DIFF WBC: CPT | Performed by: NURSE PRACTITIONER

## 2023-05-18 DIAGNOSIS — D64.9 SYMPTOMATIC ANEMIA: Primary | ICD-10-CM

## 2023-05-18 DIAGNOSIS — D64.9 SEVERE ANEMIA: Primary | ICD-10-CM

## 2023-05-18 RX ORDER — SODIUM CHLORIDE 9 MG/ML
250 INJECTION, SOLUTION INTRAVENOUS AS NEEDED
Status: CANCELLED | OUTPATIENT
Start: 2023-05-18

## 2023-05-19 ENCOUNTER — HOSPITAL ENCOUNTER (OUTPATIENT)
Dept: INFUSION THERAPY | Facility: HOSPITAL | Age: 88
Discharge: HOME | End: 2023-05-19
Payer: MEDICARE

## 2023-05-19 VITALS
OXYGEN SATURATION: 97 % | RESPIRATION RATE: 16 BRPM | DIASTOLIC BLOOD PRESSURE: 55 MMHG | HEART RATE: 74 BPM | SYSTOLIC BLOOD PRESSURE: 111 MMHG | TEMPERATURE: 98 F

## 2023-05-19 DIAGNOSIS — D64.9 SEVERE ANEMIA: ICD-10-CM

## 2023-05-19 DIAGNOSIS — Z45.2 ENCOUNTER FOR CARE RELATED TO VASCULAR ACCESS PORT: ICD-10-CM

## 2023-05-19 DIAGNOSIS — D64.9 SYMPTOMATIC ANEMIA: Primary | ICD-10-CM

## 2023-05-19 LAB
ABO GROUP BLD: NORMAL
BB HOLD TUBE: NORMAL
BLD GP AB SCN SERPL QL: NEGATIVE
HCT VFR BLD AUTO: 21.8 % (ref 34–46.6)
HGB BLD-MCNC: 7.2 G/DL (ref 12–15.9)
RH BLD: POSITIVE
T&S EXPIRATION DATE: NORMAL

## 2023-05-19 PROCEDURE — P9016 RBC LEUKOCYTES REDUCED: HCPCS

## 2023-05-19 PROCEDURE — 86900 BLOOD TYPING SEROLOGIC ABO: CPT

## 2023-05-19 PROCEDURE — 36430 TRANSFUSION BLD/BLD COMPNT: CPT

## 2023-05-19 PROCEDURE — 85014 HEMATOCRIT: CPT | Performed by: NURSE PRACTITIONER

## 2023-05-19 PROCEDURE — 86900 BLOOD TYPING SEROLOGIC ABO: CPT | Performed by: NURSE PRACTITIONER

## 2023-05-19 PROCEDURE — 86923 COMPATIBILITY TEST ELECTRIC: CPT

## 2023-05-19 PROCEDURE — 86901 BLOOD TYPING SEROLOGIC RH(D): CPT | Performed by: NURSE PRACTITIONER

## 2023-05-19 PROCEDURE — 85018 HEMOGLOBIN: CPT | Performed by: NURSE PRACTITIONER

## 2023-05-19 PROCEDURE — 25010000002 HEPARIN LOCK FLUSH PER 10 UNITS: Performed by: INTERNAL MEDICINE

## 2023-05-19 PROCEDURE — 86850 RBC ANTIBODY SCREEN: CPT | Performed by: NURSE PRACTITIONER

## 2023-05-19 RX ORDER — SODIUM CHLORIDE 0.9 % (FLUSH) 0.9 %
10 SYRINGE (ML) INJECTION AS NEEDED
OUTPATIENT
Start: 2023-05-19

## 2023-05-19 RX ORDER — HEPARIN SODIUM (PORCINE) LOCK FLUSH IV SOLN 100 UNIT/ML 100 UNIT/ML
500 SOLUTION INTRAVENOUS AS NEEDED
OUTPATIENT
Start: 2023-05-19

## 2023-05-19 RX ORDER — SODIUM CHLORIDE 9 MG/ML
250 INJECTION, SOLUTION INTRAVENOUS AS NEEDED
Status: DISCONTINUED | OUTPATIENT
Start: 2023-05-19 | End: 2023-05-21 | Stop reason: HOSPADM

## 2023-05-19 RX ORDER — SODIUM CHLORIDE 0.9 % (FLUSH) 0.9 %
20 SYRINGE (ML) INJECTION AS NEEDED
OUTPATIENT
Start: 2023-05-19

## 2023-05-19 RX ORDER — HEPARIN SODIUM (PORCINE) LOCK FLUSH IV SOLN 100 UNIT/ML 100 UNIT/ML
500 SOLUTION INTRAVENOUS AS NEEDED
Status: DISCONTINUED | OUTPATIENT
Start: 2023-05-19 | End: 2023-05-21 | Stop reason: HOSPADM

## 2023-05-19 RX ORDER — SODIUM CHLORIDE 0.9 % (FLUSH) 0.9 %
20 SYRINGE (ML) INJECTION AS NEEDED
Status: DISCONTINUED | OUTPATIENT
Start: 2023-05-19 | End: 2023-05-21 | Stop reason: HOSPADM

## 2023-05-19 RX ORDER — SODIUM CHLORIDE 0.9 % (FLUSH) 0.9 %
10 SYRINGE (ML) INJECTION AS NEEDED
Status: DISCONTINUED | OUTPATIENT
Start: 2023-05-19 | End: 2023-05-21 | Stop reason: HOSPADM

## 2023-05-19 RX ADMIN — Medication 20 ML: at 12:26

## 2023-05-19 RX ADMIN — Medication 500 UNITS: at 12:26

## 2023-05-24 ENCOUNTER — HOSPITAL ENCOUNTER (INPATIENT)
Facility: HOSPITAL | Age: 88
LOS: 1 days | Discharge: NURSING, REHAB OR LONG-TERM CARE | DRG: 377 | End: 2023-05-27
Attending: EMERGENCY MEDICINE | Admitting: INTERNAL MEDICINE
Payer: MEDICARE

## 2023-05-24 ENCOUNTER — APPOINTMENT (OUTPATIENT)
Dept: GENERAL RADIOLOGY | Facility: HOSPITAL | Age: 88
DRG: 377 | End: 2023-05-24
Payer: MEDICARE

## 2023-05-24 DIAGNOSIS — K92.2 GASTROINTESTINAL HEMORRHAGE, UNSPECIFIED GASTROINTESTINAL HEMORRHAGE TYPE: Primary | ICD-10-CM

## 2023-05-24 LAB
ABO GROUP BLD: NORMAL
ALBUMIN SERPL-MCNC: 3.2 G/DL (ref 3.5–5.2)
ALBUMIN/GLOB SERPL: 1.1 G/DL
ALP SERPL-CCNC: 82 U/L (ref 39–117)
ALT SERPL W P-5'-P-CCNC: <5 U/L (ref 1–33)
ANION GAP SERPL CALCULATED.3IONS-SCNC: 10 MMOL/L (ref 5–15)
APTT PPP: 29 SECONDS (ref 61–76.5)
AST SERPL-CCNC: 12 U/L (ref 1–32)
BASOPHILS # BLD AUTO: 0 10*3/MM3 (ref 0–0.2)
BASOPHILS NFR BLD AUTO: 0.8 % (ref 0–1.5)
BILIRUB SERPL-MCNC: 0.5 MG/DL (ref 0–1.2)
BLD GP AB SCN SERPL QL: NEGATIVE
BUN SERPL-MCNC: 26 MG/DL (ref 8–23)
BUN/CREAT SERPL: 29.9 (ref 7–25)
CALCIUM SPEC-SCNC: 8.5 MG/DL (ref 8.6–10.5)
CHLORIDE SERPL-SCNC: 107 MMOL/L (ref 98–107)
CHOLEST SERPL-MCNC: 142 MG/DL (ref 0–200)
CO2 SERPL-SCNC: 24 MMOL/L (ref 22–29)
CREAT SERPL-MCNC: 0.87 MG/DL (ref 0.57–1)
DEPRECATED RDW RBC AUTO: 61.7 FL (ref 37–54)
EGFRCR SERPLBLD CKD-EPI 2021: 65 ML/MIN/1.73
EOSINOPHIL # BLD AUTO: 0.2 10*3/MM3 (ref 0–0.4)
EOSINOPHIL NFR BLD AUTO: 5.2 % (ref 0.3–6.2)
ERYTHROCYTE [DISTWIDTH] IN BLOOD BY AUTOMATED COUNT: 17.1 % (ref 12.3–15.4)
FERRITIN SERPL-MCNC: 57.38 NG/ML (ref 13–150)
FOLATE SERPL-MCNC: 11.4 NG/ML (ref 4.78–24.2)
GEN 5 2HR TROPONIN T REFLEX: 17 NG/L
GLOBULIN UR ELPH-MCNC: 3 GM/DL
GLUCOSE SERPL-MCNC: 110 MG/DL (ref 65–99)
HAPTOGLOB SERPL-MCNC: 59 MG/DL (ref 30–200)
HBA1C MFR BLD: 4.4 % (ref 4.8–5.6)
HCT VFR BLD AUTO: 23 % (ref 34–46.6)
HCT VFR BLD AUTO: 23 % (ref 34–46.6)
HDLC SERPL-MCNC: 36 MG/DL (ref 40–60)
HGB BLD-MCNC: 7.5 G/DL (ref 12–15.9)
HGB BLD-MCNC: 7.7 G/DL (ref 12–15.9)
INR PPP: 1.05 (ref 0.93–1.1)
IRON 24H UR-MRATE: 63 MCG/DL (ref 37–145)
IRON SATN MFR SERPL: 18 % (ref 20–50)
LDLC SERPL CALC-MCNC: 75 MG/DL (ref 0–100)
LDLC/HDLC SERPL: 1.93 {RATIO}
LYMPHOCYTES # BLD AUTO: 0.8 10*3/MM3 (ref 0.7–3.1)
LYMPHOCYTES NFR BLD AUTO: 20 % (ref 19.6–45.3)
MCH RBC QN AUTO: 32.8 PG (ref 26.6–33)
MCHC RBC AUTO-ENTMCNC: 33.6 G/DL (ref 31.5–35.7)
MCV RBC AUTO: 97.7 FL (ref 79–97)
MONOCYTES # BLD AUTO: 0.3 10*3/MM3 (ref 0.1–0.9)
MONOCYTES NFR BLD AUTO: 8.4 % (ref 5–12)
NEUTROPHILS NFR BLD AUTO: 2.7 10*3/MM3 (ref 1.7–7)
NEUTROPHILS NFR BLD AUTO: 65.6 % (ref 42.7–76)
NRBC BLD AUTO-RTO: 0.1 /100 WBC (ref 0–0.2)
PLATELET # BLD AUTO: 181 10*3/MM3 (ref 140–450)
PMV BLD AUTO: 6.7 FL (ref 6–12)
POTASSIUM SERPL-SCNC: 4.2 MMOL/L (ref 3.5–5.2)
PROT SERPL-MCNC: 6.2 G/DL (ref 6–8.5)
PROTHROMBIN TIME: 11.2 SECONDS (ref 9.6–11.7)
RBC # BLD AUTO: 2.35 10*6/MM3 (ref 3.77–5.28)
RH BLD: POSITIVE
SODIUM SERPL-SCNC: 141 MMOL/L (ref 136–145)
T&S EXPIRATION DATE: NORMAL
TIBC SERPL-MCNC: 347 MCG/DL (ref 298–536)
TRANSFERRIN SERPL-MCNC: 233 MG/DL (ref 200–360)
TRIGL SERPL-MCNC: 182 MG/DL (ref 0–150)
TROPONIN T DELTA: 0 NG/L
TROPONIN T SERPL HS-MCNC: 17 NG/L
TSH SERPL DL<=0.05 MIU/L-ACNC: 2.74 UIU/ML (ref 0.27–4.2)
VIT B12 BLD-MCNC: 945 PG/ML (ref 211–946)
VLDLC SERPL-MCNC: 31 MG/DL (ref 5–40)
WBC NRBC COR # BLD: 4.1 10*3/MM3 (ref 3.4–10.8)

## 2023-05-24 PROCEDURE — 83010 ASSAY OF HAPTOGLOBIN QUANT: CPT | Performed by: NURSE PRACTITIONER

## 2023-05-24 PROCEDURE — G0378 HOSPITAL OBSERVATION PER HR: HCPCS

## 2023-05-24 PROCEDURE — 94640 AIRWAY INHALATION TREATMENT: CPT

## 2023-05-24 PROCEDURE — 85018 HEMOGLOBIN: CPT | Performed by: INTERNAL MEDICINE

## 2023-05-24 PROCEDURE — 94761 N-INVAS EAR/PLS OXIMETRY MLT: CPT

## 2023-05-24 PROCEDURE — 82746 ASSAY OF FOLIC ACID SERUM: CPT | Performed by: NURSE PRACTITIONER

## 2023-05-24 PROCEDURE — 84443 ASSAY THYROID STIM HORMONE: CPT | Performed by: NURSE PRACTITIONER

## 2023-05-24 PROCEDURE — 85730 THROMBOPLASTIN TIME PARTIAL: CPT | Performed by: EMERGENCY MEDICINE

## 2023-05-24 PROCEDURE — 82607 VITAMIN B-12: CPT | Performed by: NURSE PRACTITIONER

## 2023-05-24 PROCEDURE — 85025 COMPLETE CBC W/AUTO DIFF WBC: CPT | Performed by: EMERGENCY MEDICINE

## 2023-05-24 PROCEDURE — 83036 HEMOGLOBIN GLYCOSYLATED A1C: CPT | Performed by: NURSE PRACTITIONER

## 2023-05-24 PROCEDURE — 80053 COMPREHEN METABOLIC PANEL: CPT | Performed by: EMERGENCY MEDICINE

## 2023-05-24 PROCEDURE — 85610 PROTHROMBIN TIME: CPT | Performed by: EMERGENCY MEDICINE

## 2023-05-24 PROCEDURE — 84484 ASSAY OF TROPONIN QUANT: CPT | Performed by: EMERGENCY MEDICINE

## 2023-05-24 PROCEDURE — 86901 BLOOD TYPING SEROLOGIC RH(D): CPT | Performed by: EMERGENCY MEDICINE

## 2023-05-24 PROCEDURE — 93005 ELECTROCARDIOGRAM TRACING: CPT | Performed by: EMERGENCY MEDICINE

## 2023-05-24 PROCEDURE — 94799 UNLISTED PULMONARY SVC/PX: CPT

## 2023-05-24 PROCEDURE — 25010000002 MORPHINE PER 10 MG: Performed by: EMERGENCY MEDICINE

## 2023-05-24 PROCEDURE — 86850 RBC ANTIBODY SCREEN: CPT | Performed by: EMERGENCY MEDICINE

## 2023-05-24 PROCEDURE — 25010000002 ONDANSETRON PER 1 MG: Performed by: EMERGENCY MEDICINE

## 2023-05-24 PROCEDURE — 83540 ASSAY OF IRON: CPT | Performed by: NURSE PRACTITIONER

## 2023-05-24 PROCEDURE — 71045 X-RAY EXAM CHEST 1 VIEW: CPT

## 2023-05-24 PROCEDURE — 80061 LIPID PANEL: CPT | Performed by: NURSE PRACTITIONER

## 2023-05-24 PROCEDURE — 84466 ASSAY OF TRANSFERRIN: CPT | Performed by: NURSE PRACTITIONER

## 2023-05-24 PROCEDURE — 82728 ASSAY OF FERRITIN: CPT | Performed by: NURSE PRACTITIONER

## 2023-05-24 PROCEDURE — 86900 BLOOD TYPING SEROLOGIC ABO: CPT | Performed by: EMERGENCY MEDICINE

## 2023-05-24 PROCEDURE — 86923 COMPATIBILITY TEST ELECTRIC: CPT

## 2023-05-24 PROCEDURE — 36415 COLL VENOUS BLD VENIPUNCTURE: CPT | Performed by: EMERGENCY MEDICINE

## 2023-05-24 PROCEDURE — 99285 EMERGENCY DEPT VISIT HI MDM: CPT

## 2023-05-24 PROCEDURE — 85014 HEMATOCRIT: CPT | Performed by: INTERNAL MEDICINE

## 2023-05-24 RX ORDER — HYDROCODONE BITARTRATE AND ACETAMINOPHEN 10; 325 MG/1; MG/1
1 TABLET ORAL EVERY 6 HOURS
Status: DISCONTINUED | OUTPATIENT
Start: 2023-05-24 | End: 2023-05-24

## 2023-05-24 RX ORDER — DIAPER,BRIEF,INFANT-TODD,DISP
1 EACH MISCELLANEOUS 2 TIMES DAILY
COMMUNITY

## 2023-05-24 RX ORDER — SODIUM CHLORIDE 0.9 % (FLUSH) 0.9 %
3-10 SYRINGE (ML) INJECTION AS NEEDED
Status: DISCONTINUED | OUTPATIENT
Start: 2023-05-24 | End: 2023-05-25 | Stop reason: HOSPADM

## 2023-05-24 RX ORDER — FUROSEMIDE 40 MG/1
40 TABLET ORAL DAILY
Status: DISCONTINUED | OUTPATIENT
Start: 2023-05-25 | End: 2023-05-27 | Stop reason: HOSPADM

## 2023-05-24 RX ORDER — HYDROCODONE BITARTRATE AND ACETAMINOPHEN 10; 325 MG/1; MG/1
1 TABLET ORAL EVERY 6 HOURS PRN
Status: DISCONTINUED | OUTPATIENT
Start: 2023-05-24 | End: 2023-05-27 | Stop reason: HOSPADM

## 2023-05-24 RX ORDER — NITROGLYCERIN 0.4 MG/1
0.4 TABLET SUBLINGUAL
Status: DISCONTINUED | OUTPATIENT
Start: 2023-05-24 | End: 2023-05-27 | Stop reason: HOSPADM

## 2023-05-24 RX ORDER — CETIRIZINE HYDROCHLORIDE 10 MG/1
10 TABLET ORAL NIGHTLY
Status: DISCONTINUED | OUTPATIENT
Start: 2023-05-24 | End: 2023-05-27 | Stop reason: HOSPADM

## 2023-05-24 RX ORDER — SODIUM CHLORIDE 9 MG/ML
40 INJECTION, SOLUTION INTRAVENOUS AS NEEDED
Status: DISCONTINUED | OUTPATIENT
Start: 2023-05-24 | End: 2023-05-27 | Stop reason: HOSPADM

## 2023-05-24 RX ORDER — GABAPENTIN 300 MG/1
300 CAPSULE ORAL 3 TIMES DAILY
Status: DISCONTINUED | OUTPATIENT
Start: 2023-05-24 | End: 2023-05-27 | Stop reason: HOSPADM

## 2023-05-24 RX ORDER — FLUTICASONE PROPIONATE 50 MCG
2 SPRAY, SUSPENSION (ML) NASAL DAILY
COMMUNITY

## 2023-05-24 RX ORDER — BUDESONIDE AND FORMOTEROL FUMARATE DIHYDRATE 160; 4.5 UG/1; UG/1
2 AEROSOL RESPIRATORY (INHALATION)
Status: DISCONTINUED | OUTPATIENT
Start: 2023-05-24 | End: 2023-05-27 | Stop reason: HOSPADM

## 2023-05-24 RX ORDER — ALBUTEROL SULFATE 2.5 MG/3ML
2.5 SOLUTION RESPIRATORY (INHALATION) EVERY 6 HOURS PRN
Status: DISCONTINUED | OUTPATIENT
Start: 2023-05-24 | End: 2023-05-27 | Stop reason: HOSPADM

## 2023-05-24 RX ORDER — PANTOPRAZOLE SODIUM 40 MG/10ML
40 INJECTION, POWDER, LYOPHILIZED, FOR SOLUTION INTRAVENOUS
Status: DISCONTINUED | OUTPATIENT
Start: 2023-05-24 | End: 2023-05-25

## 2023-05-24 RX ORDER — SODIUM CHLORIDE 0.9 % (FLUSH) 0.9 %
10 SYRINGE (ML) INJECTION AS NEEDED
Status: DISCONTINUED | OUTPATIENT
Start: 2023-05-24 | End: 2023-05-27 | Stop reason: HOSPADM

## 2023-05-24 RX ORDER — ROPINIROLE 8 MG/1
8 TABLET, FILM COATED, EXTENDED RELEASE ORAL NIGHTLY
COMMUNITY

## 2023-05-24 RX ORDER — ACETAMINOPHEN 325 MG/1
650 TABLET ORAL EVERY 8 HOURS PRN
Status: DISCONTINUED | OUTPATIENT
Start: 2023-05-24 | End: 2023-05-27 | Stop reason: HOSPADM

## 2023-05-24 RX ORDER — ONDANSETRON 2 MG/ML
4 INJECTION INTRAMUSCULAR; INTRAVENOUS ONCE
Status: COMPLETED | OUTPATIENT
Start: 2023-05-24 | End: 2023-05-24

## 2023-05-24 RX ORDER — SODIUM CHLORIDE 0.9 % (FLUSH) 0.9 %
3 SYRINGE (ML) INJECTION EVERY 12 HOURS SCHEDULED
Status: DISCONTINUED | OUTPATIENT
Start: 2023-05-24 | End: 2023-05-25 | Stop reason: HOSPADM

## 2023-05-24 RX ORDER — ALLOPURINOL 100 MG/1
100 TABLET ORAL DAILY
Status: DISCONTINUED | OUTPATIENT
Start: 2023-05-25 | End: 2023-05-27 | Stop reason: HOSPADM

## 2023-05-24 RX ORDER — FERROUS SULFATE TAB EC 324 MG (65 MG FE EQUIVALENT) 324 (65 FE) MG
324 TABLET DELAYED RESPONSE ORAL
Status: DISCONTINUED | OUTPATIENT
Start: 2023-05-25 | End: 2023-05-27 | Stop reason: HOSPADM

## 2023-05-24 RX ORDER — SODIUM CHLORIDE 0.9 % (FLUSH) 0.9 %
10 SYRINGE (ML) INJECTION EVERY 12 HOURS SCHEDULED
Status: DISCONTINUED | OUTPATIENT
Start: 2023-05-24 | End: 2023-05-27 | Stop reason: HOSPADM

## 2023-05-24 RX ORDER — MORPHINE SULFATE 2 MG/ML
2 INJECTION, SOLUTION INTRAMUSCULAR; INTRAVENOUS ONCE
Status: COMPLETED | OUTPATIENT
Start: 2023-05-24 | End: 2023-05-24

## 2023-05-24 RX ORDER — PANTOPRAZOLE SODIUM 40 MG/10ML
40 INJECTION, POWDER, LYOPHILIZED, FOR SOLUTION INTRAVENOUS ONCE
Status: COMPLETED | OUTPATIENT
Start: 2023-05-24 | End: 2023-05-24

## 2023-05-24 RX ORDER — LUBIPROSTONE 24 UG/1
24 CAPSULE ORAL 2 TIMES DAILY WITH MEALS
Status: DISCONTINUED | OUTPATIENT
Start: 2023-05-24 | End: 2023-05-24

## 2023-05-24 RX ORDER — DULOXETIN HYDROCHLORIDE 30 MG/1
60 CAPSULE, DELAYED RELEASE ORAL NIGHTLY
Status: DISCONTINUED | OUTPATIENT
Start: 2023-05-24 | End: 2023-05-27 | Stop reason: HOSPADM

## 2023-05-24 RX ADMIN — HYDROCODONE BITARTRATE AND ACETAMINOPHEN 1 TABLET: 10; 325 TABLET ORAL at 19:54

## 2023-05-24 RX ADMIN — DULOXETINE HYDROCHLORIDE 60 MG: 30 CAPSULE, DELAYED RELEASE ORAL at 22:10

## 2023-05-24 RX ADMIN — ROPINIROLE HYDROCHLORIDE 2.75 MG: 1 TABLET, FILM COATED ORAL at 22:11

## 2023-05-24 RX ADMIN — ONDANSETRON 4 MG: 2 INJECTION INTRAMUSCULAR; INTRAVENOUS at 13:36

## 2023-05-24 RX ADMIN — Medication 10 ML: at 22:11

## 2023-05-24 RX ADMIN — GABAPENTIN 300 MG: 300 CAPSULE ORAL at 22:10

## 2023-05-24 RX ADMIN — PANTOPRAZOLE SODIUM 40 MG: 40 INJECTION, POWDER, FOR SOLUTION INTRAVENOUS at 12:41

## 2023-05-24 RX ADMIN — CARBIDOPA AND LEVODOPA 2 TABLET: 25; 100 TABLET ORAL at 22:10

## 2023-05-24 RX ADMIN — CETIRIZINE HYDROCHLORIDE 10 MG: 10 TABLET, FILM COATED ORAL at 22:10

## 2023-05-24 RX ADMIN — PANTOPRAZOLE SODIUM 40 MG: 40 INJECTION, POWDER, LYOPHILIZED, FOR SOLUTION INTRAVENOUS at 17:09

## 2023-05-24 RX ADMIN — MORPHINE SULFATE 2 MG: 2 INJECTION, SOLUTION INTRAMUSCULAR; INTRAVENOUS at 13:36

## 2023-05-24 NOTE — ED NOTES
Chief Complaint   Patient presents with    Black or Bloody Stool     From triage: Pt came in via [facility] from [facility] with c/o blood in her stool. Facility states that pt has abnormal labs, hemoglobin of 7.5.    Pt states she does not have any complaints today.  Aox4.

## 2023-05-24 NOTE — H&P
[facility] Medicine Services  History & Physical    Patient Name: Lorenza Owens  : 1935  MRN: [id number]  Primary Care Physician:  Shikha Mcmahon APRN  Date of admission: 2023  Date and Time of Service: 2023 at 14:43 EDT    Subjective      Chief Complaint: GI Bleed and fatigued    History of Present Illness: Lorenza Owens is a 88 y.o. female who presented to [facility] on 2023 complaining of dark stool.  Per report I see there is been low hemoglobin is 7.5 as well.  Patient is not sure how long she has had dark stool.  She does admit to increasing fatigue and weakness.  She is not quite sure how long that no symptoms have been going on.  She is not having any chest pain or shortness of breath.  No abdominal pain.  She is not on any blood thinners that she knows of.  No vomiting or diarrhea.     No radiology results for the last day    ECG 12 Lead Other; weakness   Preliminary Result   HEART RATE= 82  bpm   RR Interval= 731  ms   NH Interval= 129  ms   P Horizontal Axis= -17  deg   P Front Axis= 34  deg   QRSD Interval= 89  ms   QT Interval= 406  ms   QRS Axis= 42  deg   T Wave Axis= 59  deg   - ABNORMAL ECG -   Sinus rhythm   Supraventricular bigeminy   When compared with ECG of 22-Oct-2022 11:12:45,   No significant change   Electronically Signed By:    Date and Time of Study: 2023 12:26:09              Review of Systems   Constitutional: Positive for malaise/fatigue.   Musculoskeletal: Positive for joint pain, muscle weakness and stiffness.        Chronic Bilateral lower legs weak, wheelchair bound   All other systems reviewed and are negative.       Personal History     Past Medical History:   Diagnosis Date   • Acute kidney injury (MIRI) with acute tubular necrosis (ATN) 10/07/2019   • Anemia 2013   • Anxiety disorder 2019   • Cellulitis of right lower extremity 10/07/2019   • Depression 2013   • Edema of lower extremity 2017   •  Fibromyalgia 07/19/2013   • Gastroesophageal reflux disease 06/17/2019   • Hemorrhoids 04/03/2012   • Hyperlipidemia 07/19/2013   • Hypothyroidism 07/19/2013   • Low back pain 02/24/2016   • Myelodysplastic syndrome 07/06/2022   • Peripheral venous insufficiency 09/26/2011   • Polyosteoarthritis 12/05/2011   • Sleep apnea 06/21/2013   • Wheelchair dependence 07/19/2013       Past Surgical History:   Procedure Laterality Date   • ENDOSCOPY N/A 5/11/2021    Procedure: ESOPHAGOGASTRODUODENOSCOPY ARGON PLASMA COAGULATION;  Surgeon: Lori Light MD;  Location: [facility] ENDOSCOPY;  Service: Gastroenterology;  Laterality: N/A;  ARTERIALVENOUS MALFORMATION   • ENDOSCOPY N/A 7/29/2021    Procedure: ESOPHAGOGASTRODUODENOSCOPY;  Surgeon: DANIEL Harris MD;  Location: [facility] ENDOSCOPY;  Service: Gastroenterology;  Laterality: N/A;  Post: Food in stomach, portal hypertensive gastropathy   • KNEE ARTHROPLASTY         Family History: family history includes No Known Problems in her father and mother. Otherwise pertinent FHx was reviewed and not pertinent to current issue.    Social History:  reports that she has never smoked. She has never used smokeless tobacco. She reports that she does not drink alcohol and does not use drugs.    Home Medications:  Prior to Admission Medications     Prescriptions Last Dose Informant Patient Reported? Taking?    acetaminophen (TYLENOL) 325 MG tablet   Yes No    Take 650 mg by mouth Every 8 (Eight) Hours As Needed for Mild Pain  or Fever.    albuterol sulfate  (90 Base) MCG/ACT inhaler   Yes No    Inhale 2 puffs Every 4 (Four) Hours As Needed for Wheezing.    allopurinol (ZYLOPRIM) 100 MG tablet   Yes No    Take 100 mg by mouth Daily.    benzonatate (TESSALON) 100 MG capsule   Yes No    Take 1 capsule by mouth 3 (Three) Times a Day As Needed for Cough.    bisacodyl (DULCOLAX) 10 MG suppository   Yes No    Insert 10 mg into the rectum As Needed for Constipation.    carbidopa-levodopa  (SINEMET)  MG per tablet   Yes No    Take 2 tablets by mouth 3 (Three) Times a Day.    cetirizine (zyrTEC) 10 MG tablet   Yes No    Take 10 mg by mouth every night at bedtime.    Cranberry 500 MG tablet   Yes No    Take 500 mg by mouth 2 (two) times a day.    cyanocobalamin 1000 MCG/ML injection   Yes No    Inject 1,000 mcg into the appropriate muscle as directed by prescriber Every 30 (Thirty) Days.    Diclofenac Sodium (VOLTAREN) 1 % gel gel   Yes No    Apply 2 g topically to the appropriate area as directed 2 (Two) Times a Day. Bilateral knees    DULoxetine HCl (CYMBALTA PO)   Yes No    Take 60 mg by mouth every night at bedtime.    epoetin enedelia-epbx (RETACRIT) 42373 UNIT/ML injection   Yes No    Inject 1 mL under the skin into the appropriate area as directed 1 (One) Time Per Week. Friday    esomeprazole (nexIUM) 40 MG capsule   Yes No    Take 40 mg by mouth 2 (two) times a day.    fenofibrate (TRICOR) 145 MG tablet   Yes No    Take 145 mg by mouth Daily.    fentaNYL (DURAGESIC) 25 MCG/HR patch   Yes No    Place 1 patch on the skin as directed by provider Every 72 (Seventy-Two) Hours.    ferrous sulfate 140 (45 Fe) MG tablet controlled-release tablet   Yes No    Take 1 tablet by mouth Daily With Breakfast.    ferrous sulfate 324 (65 Fe) MG tablet delayed-release EC tablet   No No    Take 1 tablet by mouth Daily With Breakfast.    fluticasone-salmeterol (ADVAIR) 250-50 MCG/DOSE DISKUS   Yes No    Inhale 1 puff 2 (Two) Times a Day.    furosemide (LASIX) 20 MG tablet   Yes No    Take 20 mg by mouth Daily.    gabapentin (NEURONTIN) 100 MG capsule   Yes No    Take 2 capsules by mouth 3 (Three) Times a Day.    guaiFENesin (ROBITUSSIN) 100 MG/5ML syrup   Yes No    Take 600 mg by mouth Every 6 (Six) Hours As Needed for Cough.    HYDROcodone-acetaminophen (NORCO)  MG per tablet   Yes No    Take 1 tablet by mouth Every 6 (Six) Hours.    ipratropium-albuterol (DUO-NEB) 0.5-2.5 mg/3 ml nebulizer   No No    Take  3 mL by nebulization Every 6 (Six) Hours As Needed for Wheezing.    levothyroxine (SYNTHROID, LEVOTHROID) 137 MCG tablet   Yes No    Take 137 mcg by mouth Daily.    liver oil-zinc oxide (DESITIN) 40 % ointment   Yes No    Apply 1 application topically to the appropriate area as directed Every 12 (Twelve) Hours As Needed for Irritation.    lubiprostone (AMITIZA) 24 MCG capsule   Yes No    Take 24 mcg by mouth 2 (Two) Times a Day With Meals.    magnesium oxide (MAGOX) 400 (241.3 Mg) MG tablet tablet   Yes No    Take 1 tablet by mouth Every Other Day.    melatonin 5 MG tablet tablet   Yes No    Take 2 tablets by mouth.    nystatin (MYCOSTATIN) 379761 UNIT/GM powder   Yes No    Apply 1 application topically to the appropriate area as directed As Needed (yeast rash). Apply to abdominal rash    polyethylene glycol (MIRALAX) packet   Yes No    Take 17 g by mouth Every Morning.    potassium chloride (K-DUR,KLOR-CON) 10 MEQ CR tablet   Yes No    Take 1 tablet by mouth Daily.    rOPINIRole (REQUIP) 3 MG tablet   Yes No    Take 2 tablets by mouth every night at bedtime.    senna (SENOKOT) 8.6 MG tablet tablet   Yes No    Take 1 tablet by mouth 2 (Two) Times a Day.    simethicone (MYLICON) 80 MG chewable tablet   Yes No    Chew 80 mg Every 6 (Six) Hours As Needed for Flatulence.    vitamin D (ERGOCALCIFEROL) 55437 units capsule capsule   Yes No    Take 50,000 Units by mouth 1 (One) Time Per Week. Monday            Allergies:  Allergies   Allergen Reactions   • Latex Rash   • Morphine Confusion       Objective      Vitals:   Temp:  [98.6 °F (37 °C)] 98.6 °F (37 °C)  Heart Rate:  [74-88] 81  Resp:  [13-17] 13  BP: ()/(43-65) 110/50    Physical Exam  Vitals reviewed.   Constitutional:       Appearance: She is obese.   HENT:      Head: Normocephalic.      Mouth/Throat:      Mouth: Mucous membranes are moist.   Eyes:      Pupils: Pupils are equal, round, and reactive to light.   Cardiovascular:      Rate and Rhythm: Normal  rate and regular rhythm.      Pulses: Normal pulses.      Heart sounds: Normal heart sounds.   Pulmonary:      Effort: Pulmonary effort is normal.      Breath sounds: Normal breath sounds.   Abdominal:      General: Bowel sounds are normal.      Palpations: Abdomen is soft.   Musculoskeletal:      Left lower leg: Tenderness present. 1+ Edema present.      Comments: Old healed wound on LLE. Warm to touch and some swelling.   Skin:     General: Skin is warm and dry.   Neurological:      Mental Status: She is alert and oriented to person, place, and time.          Result Review    Result Review:  I have personally reviewed the results from the time of this admission to 5/24/2023 14:43 EDT and agree with these findings:  [x]  Laboratory  [x]  Microbiology  [x]  Radiology  [x]  EKG/Telemetry   []  Cardiology/Vascular   []  Pathology  []  Old records  []  Other:      Assessment & Plan        Active Hospital Problems:  Active Hospital Problems    Diagnosis    • **Gastrointestinal hemorrhage, unspecified gastrointestinal hemorrhage type      Plan:     Hematochezia  H/O gastric and duodenal AVMs  - black stool that is guaiac positive in ED, not on any anticoagulation  - Hgb 7.7, baseline appears around 8  - Monitor H&H  - GI Consulted   - NPO after midnight for EGD in the a.m.  - PPI    Left lower extremity weakness  PVD  - Warm to touch and some swelling  - Venous doppler ordered    Parkinson's Disease  Wheelchair dependent  - Patient reports she has not had physical therapy in years  - Resume Sinemet    Fibromyalgia  -Resume Voltaren, Cymbalta, Norco as needed, Requip and Neurontin  -Hold fentanyl patch as patient is drowsy  -Fall precautions    Hypothyroidism  - Check TSH  - Resume Synthroid    GERD: PPI  Hyperlipidemia: Check lipid panel  Obstructive sleep apnea    DVT prophylaxis:  No DVT prophylaxis order currently exists.    CODE STATUS:       Admission Status:  I believe this patient meets observation status.    I  discussed the patient's findings and my recommendations with patient and family.      Signature: Electronically signed by DENISHA Hernandez, 05/24/23, 14:43 EDT.  [facility] Hospitalist Team

## 2023-05-24 NOTE — NURSING NOTE
Patient has fentanyl patch on left back shoulder. Verified with Primary and another nurse. Date on patch is 5/23/23.

## 2023-05-24 NOTE — ED PROVIDER NOTES
Subjective   History of Present Illness  Chief complaint: 88-year-old female who presents with dark stool.  Per report I see there is been low hemoglobin is 7.5 as well.  Patient is not sure how long she has had dark stool.  She does admit to increasing fatigue and weakness.  She is not quite sure how long that no symptoms have been going on.  She is not having any chest pain or shortness of breath.  No abdominal pain.  She is not on any blood thinners that she knows of.  No vomiting or diarrhea    Context:    Duration:    Timing: Sudden onset    Severity: Severe    Associated Symptoms:        PCP:  LMP:        Review of Systems   Constitutional: Positive for fatigue.   Respiratory: Negative for shortness of breath.    Cardiovascular: Negative.    Gastrointestinal: Positive for blood in stool.   Musculoskeletal: Negative.        Past Medical History:   Diagnosis Date   • Acute kidney injury (MIRI) with acute tubular necrosis (ATN) 10/07/2019   • Anemia 04/04/2013   • Anxiety disorder 06/17/2019   • Cellulitis of right lower extremity 10/07/2019   • Depression 01/23/2013   • Edema of lower extremity 05/17/2017   • Fibromyalgia 07/19/2013   • Gastroesophageal reflux disease 06/17/2019   • Hemorrhoids 04/03/2012   • Hyperlipidemia 07/19/2013   • Hypothyroidism 07/19/2013   • Low back pain 02/24/2016   • Myelodysplastic syndrome 07/06/2022   • Peripheral venous insufficiency 09/26/2011   • Polyosteoarthritis 12/05/2011   • Sleep apnea 06/21/2013   • Wheelchair dependence 07/19/2013       Allergies   Allergen Reactions   • Latex Rash   • Morphine Confusion       Past Surgical History:   Procedure Laterality Date   • ENDOSCOPY N/A 5/11/2021    Procedure: ESOPHAGOGASTRODUODENOSCOPY ARGON PLASMA COAGULATION;  Surgeon: Lori Light MD;  Location: [facility] ENDOSCOPY;  Service: Gastroenterology;  Laterality: N/A;  ARTERIALVENOUS MALFORMATION   • ENDOSCOPY N/A 7/29/2021    Procedure: ESOPHAGOGASTRODUODENOSCOPY;  Surgeon:  DANIEL Harris MD;  Location: [facility] ENDOSCOPY;  Service: Gastroenterology;  Laterality: N/A;  Post: Food in stomach, portal hypertensive gastropathy   • KNEE ARTHROPLASTY         Family History   Problem Relation Age of Onset   • No Known Problems Mother    • No Known Problems Father        Social History     Socioeconomic History   • Marital status:    Tobacco Use   • Smoking status: Never   • Smokeless tobacco: Never   Vaping Use   • Vaping Use: Never used   Substance and Sexual Activity   • Alcohol use: No   • Drug use: No   • Sexual activity: Defer           Objective   Physical Exam  Vitals and nursing note reviewed. Exam conducted with a chaperone present.   Constitutional:       Appearance: Normal appearance.   HENT:      Head: Normocephalic and atraumatic.   Eyes:      Extraocular Movements: Extraocular movements intact.   Cardiovascular:      Rate and Rhythm: Normal rate and regular rhythm.   Pulmonary:      Effort: Pulmonary effort is normal.      Breath sounds: Normal breath sounds.   Abdominal:      Tenderness: There is no abdominal tenderness.   Genitourinary:     Comments: Natalie JOHNSON present for exam.  Dark black stool.  Hemoccult positive  Musculoskeletal:         General: No tenderness.   Skin:     General: Skin is warm and dry.   Neurological:      General: No focal deficit present.      Mental Status: She is alert and oriented to person, place, and time.   Psychiatric:         Mood and Affect: Mood normal.         Behavior: Behavior normal.         Thought Content: Thought content normal.         Judgment: Judgment normal.         Procedures           ED Course           Results for orders placed or performed during the hospital encounter of 05/24/23   Protime-INR    Specimen: Blood   Result Value Ref Range    Protime 11.2 9.6 - 11.7 Seconds    INR 1.05 0.93 - 1.10   aPTT    Specimen: Blood   Result Value Ref Range    PTT 29.0 (L) 61.0 - 76.5 seconds   CBC Auto Differential    Specimen:  Blood   Result Value Ref Range    WBC 4.10 3.40 - 10.80 10*3/mm3    RBC 2.35 (L) 3.77 - 5.28 10*6/mm3    Hemoglobin 7.7 (L) 12.0 - 15.9 g/dL    Hematocrit 23.0 (L) 34.0 - 46.6 %    MCV 97.7 (H) 79.0 - 97.0 fL    MCH 32.8 26.6 - 33.0 pg    MCHC 33.6 31.5 - 35.7 g/dL    RDW 17.1 (H) 12.3 - 15.4 %    RDW-SD 61.7 (H) 37.0 - 54.0 fl    MPV 6.7 6.0 - 12.0 fL    Platelets 181 140 - 450 10*3/mm3    Neutrophil % 65.6 42.7 - 76.0 %    Lymphocyte % 20.0 19.6 - 45.3 %    Monocyte % 8.4 5.0 - 12.0 %    Eosinophil % 5.2 0.3 - 6.2 %    Basophil % 0.8 0.0 - 1.5 %    Neutrophils, Absolute 2.70 1.70 - 7.00 10*3/mm3    Lymphocytes, Absolute 0.80 0.70 - 3.10 10*3/mm3    Monocytes, Absolute 0.30 0.10 - 0.90 10*3/mm3    Eosinophils, Absolute 0.20 0.00 - 0.40 10*3/mm3    Basophils, Absolute 0.00 0.00 - 0.20 10*3/mm3    nRBC 0.1 0.0 - 0.2 /100 WBC   High Sensitivity Troponin T    Specimen: Blood   Result Value Ref Range    HS Troponin T 17 (H) <10 ng/L   ECG 12 Lead Other; weakness   Result Value Ref Range    QT Interval 406 ms   Type & Screen    Specimen: Blood   Result Value Ref Range    ABO Type A     RH type Positive                                      Medical Decision Making  Patient was seen and evaluated for above problem    Differential diagnosis includes but is not limited to non-STEMI, peptic ulcer disease, coma diverticular bleed, small bowel bleed, hemorrhoid    On exam external rectum does not appear to show hemorrhoid present.  She has black stool that is guaiac positive.  Indicative of upper GI bleed.  She was treated with Protonix.  She has no abdominal pain or rigidity.  I do not think she has any bowel perforation.  But her hemoglobin is at 7.7.  Will admit for GI consultation and evaluation for endoscopy.    Gastrointestinal hemorrhage, unspecified gastrointestinal hemorrhage type: acute illness or injury  Amount and/or Complexity of Data Reviewed  Labs: ordered. Decision-making details documented in ED Course.      Details: 7.7 hemoglobin.  Reviewed as above.  Labs.  ECG/medicine tests: ordered and independent interpretation performed.     Details: EKG interpreted by myself shows sinus rhythm with supraventricular bigeminy rate of 82.  Compared to old EKG dated 10/22/2022 showed sinus rhythm rate of 81.  Discussion of management or test interpretation with external provider(s): Spoke with Jaqueline on-call for Dr. Aleman who agrees to admit the patient to their service for gastroenterology consultation and management.  Blood transfusion if necessary.    Risk  Prescription drug management.  Decision regarding hospitalization.          Final diagnoses:   None     Upper GI bleed  ED Disposition  ED Disposition     None          No follow-up provider specified.       Medication List      No changes were made to your prescriptions during this visit.          Rasta Rowland,   05/24/23 [id number]

## 2023-05-24 NOTE — Clinical Note
Level of Care: Med/Surg [1]   Admitting Physician: EFE SOLORIO [[id number]]   Attending Physician: EFE SOLORIO [[id number]]   Bed Request Comments: tele

## 2023-05-24 NOTE — CONSULTS
GI CONSULT  NOTE:    Referring Provider:  Dr. Rowland    Chief complaint: Melena    Subjective .     History of present illness: Lorezna Owens is a 88 y.o. female with history of upper GI AVMs, anxiety/depression, fibromyalgia, hyperlipidemia, hypothyroidism, myelodysplastic syndrome, and CLAUDIA who presents with complaints of melena.  The patient states that she is unsure when melena began, but states that she was told that her stool looked dark.  She has been taking oral iron, but denies any Pepto-Bismol use.  She reports regular bowel movements and denies any bright red blood per rectum.  She is not having any abdominal pain.  No nausea/vomiting, heartburn, or dysphagia.  Denies any NSAID use.  No recent fever.  She does report a minimal amount of unintentional weight loss recently.  Of note, she is followed by hematology due to her history of myelodysplastic syndrome.      Endo History:  7/2021 EGD (Dr. Harris) -food in the stomach, friable mucosa in the stomach body  5/11/21 EGD by Dr. Light -bleeding AVMs in the antrum and duodenum X 10 s/p APC  4/2013 EGD and colonoscopy by Dr. Glez -antral ulcers with biopsies H. pylori negative, normal colon    Past Medical History:  Past Medical History:   Diagnosis Date   • Acute kidney injury (MIRI) with acute tubular necrosis (ATN) 10/07/2019   • Anemia 04/04/2013   • Anxiety disorder 06/17/2019   • Cellulitis of right lower extremity 10/07/2019   • Depression 01/23/2013   • Edema of lower extremity 05/17/2017   • Fibromyalgia 07/19/2013   • Gastroesophageal reflux disease 06/17/2019   • Hemorrhoids 04/03/2012   • Hyperlipidemia 07/19/2013   • Hypothyroidism 07/19/2013   • Low back pain 02/24/2016   • Myelodysplastic syndrome 07/06/2022   • Peripheral venous insufficiency 09/26/2011   • Polyosteoarthritis 12/05/2011   • Sleep apnea 06/21/2013   • Wheelchair dependence 07/19/2013       Past Surgical History:  Past Surgical History:   Procedure Laterality Date   •  "ENDOSCOPY N/A 5/11/2021    Procedure: ESOPHAGOGASTRODUODENOSCOPY ARGON PLASMA COAGULATION;  Surgeon: Lori Light MD;  Location: [facility] ENDOSCOPY;  Service: Gastroenterology;  Laterality: N/A;  ARTERIALVENOUS MALFORMATION   • ENDOSCOPY N/A 7/29/2021    Procedure: ESOPHAGOGASTRODUODENOSCOPY;  Surgeon: DANIEL Harris MD;  Location: [facility] ENDOSCOPY;  Service: Gastroenterology;  Laterality: N/A;  Post: Food in stomach, portal hypertensive gastropathy   • KNEE ARTHROPLASTY         Social History:  Social History     Tobacco Use   • Smoking status: Never   • Smokeless tobacco: Never   Vaping Use   • Vaping Use: Never used   Substance Use Topics   • Alcohol use: No   • Drug use: No       Family History:  Family History   Problem Relation Age of Onset   • No Known Problems Mother    • No Known Problems Father        Medications:  (Not in a hospital admission)      Scheduled Meds:pantoprazole, 40 mg, Intravenous, BID AC      Continuous Infusions:   PRN Meds:.•  [COMPLETED] Insert Peripheral IV **AND** sodium chloride  •  sodium chloride    ALLERGIES:  Latex and Morphine    ROS:  The following systems were reviewed;   Constitution:  No fevers, no chills, unintentional weight loss  Skin: no rash, no jaundice  Eyes:  No blurry vision, no eye pain  HENT:  No change in hearing or smell  Resp:  No dyspnea or cough  CV:  No chest pain or palpitations  :  No dysuria, hematuria  Musculoskeletal:  No leg cramps or arthralgias  Neuro:  No tremor, no numbness  Psych:  No depression or confusion    Objective     Vital Signs:   Vitals:    05/24/23 1117 05/24/23 1214 05/24/23 1234 05/24/23 1317   BP: 132/65 93/43 116/53 122/50   BP Location: Right arm      Patient Position: Lying      Pulse: 74 83 81 88   Resp: 17  16 13   Temp: 98.6 °F (37 °C)      TempSrc: Oral      SpO2: 96% 98% 99% 100%   Weight: 95.7 kg (211 lb)      Height: 152.4 cm (60\")          Physical Exam:       General Appearance:    Awake and alert, in no acute " distress   Head:    Normocephalic, without obvious abnormality, atraumatic   Throat:   No oral lesions, no thrush, oral mucosa moist   Lungs:     Respirations regular, even and unlabored   Chest Wall:    No abnormalities observed   Abdomen:     Soft, non-tender, no rebound or guarding, non-distended   Rectal:     Deferred   Extremities:   Moves all extremities, no edema, no cyanosis   Pulses:   Pulses palpable and equal bilaterally   Skin:   No rash, no jaundice, normal palpation   Lymph nodes:   No cervical, supraclavicular or submandibular palpable adenopathy   Neurologic:   Cranial nerves 2 - 12 grossly intact, no asterixis       Results Review:   I reviewed the patient's labs and imaging.  CBC    Results from last 7 days   Lab Units 05/24/23  1239 05/19/23  0743   WBC 10*3/mm3 4.10  --    HEMOGLOBIN g/dL 7.7* 7.2*   PLATELETS 10*3/mm3 181  --      CMP   Results from last 7 days   Lab Units 05/24/23  1239   SODIUM mmol/L 141   POTASSIUM mmol/L 4.2   CHLORIDE mmol/L 107   CO2 mmol/L 24.0   BUN mg/dL 26*   CREATININE mg/dL 0.87   GLUCOSE mg/dL 110*   ALBUMIN g/dL 3.2*   BILIRUBIN mg/dL 0.5   ALK PHOS U/L 82   AST (SGOT) U/L 12   ALT (SGPT) U/L <5     Cr Clearance Estimated Creatinine Clearance: 48.1 mL/min (by C-G formula based on SCr of 0.87 mg/dL).  Coag   Results from last 7 days   Lab Units 05/24/23  1239   INR  1.05   APTT seconds 29.0*     HbA1C   Lab Results   Component Value Date    HGBA1C 4.1 10/23/2022     Blood Glucose No results found for: POCGLU  Infection     UA      Radiology(recent) No radiology results for the last day       ASSESSMENT:  -Melena  -Macrocytic anemia  -Myelodysplastic syndrome  -Anxiety/depression  -Fibromyalgia  -Hyperlipidemia  -Hypothyroidism  -CLAUDIA    PLAN:  Patient is an 88-year-old female with history of anxiety/depression, fibromyalgia, and myelodysplastic syndrome who presents with complaints of melena.  She is unsure how long her stools have been dark.  She does have a  history of gastric and duodenal AVMs on EGD in 5/2021.    Hemoglobin is currently 7.7.  Continue to monitor H/H and transfuse as needed.  Review of records shows that hemoglobin typically runs between 7.5 and 10 dating back to 2018.  Stool was heme positive in the ER.  She is not on any oral anticoagulants.  We will proceed with EGD in the morning for further evaluation.  N.p.o. after midnight.  Start PPI twice daily.  We will check vitamin B12, folate, and ferritin.  Supportive care.      I discussed the patients findings and my recommendations with the patient.  I will discuss the case with Dr. Proctor and change the plan accordingly.    We appreciate the referral.    Electronically signed by DENISHA Xiong, 05/24/23, 1:53 PM EDT.

## 2023-05-25 ENCOUNTER — ANESTHESIA EVENT (OUTPATIENT)
Dept: GASTROENTEROLOGY | Facility: HOSPITAL | Age: 88
DRG: 377 | End: 2023-05-25
Payer: MEDICARE

## 2023-05-25 ENCOUNTER — ANESTHESIA (OUTPATIENT)
Dept: GASTROENTEROLOGY | Facility: HOSPITAL | Age: 88
DRG: 377 | End: 2023-05-25
Payer: MEDICARE

## 2023-05-25 ENCOUNTER — APPOINTMENT (OUTPATIENT)
Dept: CARDIOLOGY | Facility: HOSPITAL | Age: 88
DRG: 377 | End: 2023-05-25
Payer: MEDICARE

## 2023-05-25 LAB
ALBUMIN SERPL-MCNC: 3.1 G/DL (ref 3.5–5.2)
ALBUMIN/GLOB SERPL: 0.9 G/DL
ALP SERPL-CCNC: 78 U/L (ref 39–117)
ALT SERPL W P-5'-P-CCNC: <5 U/L (ref 1–33)
ANION GAP SERPL CALCULATED.3IONS-SCNC: 8 MMOL/L (ref 5–15)
AST SERPL-CCNC: 18 U/L (ref 1–32)
BASOPHILS # BLD AUTO: 0 10*3/MM3 (ref 0–0.2)
BASOPHILS NFR BLD AUTO: 1 % (ref 0–1.5)
BH CV LOWER VASCULAR LEFT COMMON FEMORAL AUGMENT: NORMAL
BH CV LOWER VASCULAR LEFT COMMON FEMORAL COMPETENT: NORMAL
BH CV LOWER VASCULAR LEFT COMMON FEMORAL COMPRESS: NORMAL
BH CV LOWER VASCULAR LEFT COMMON FEMORAL PHASIC: NORMAL
BH CV LOWER VASCULAR LEFT COMMON FEMORAL SPONT: NORMAL
BH CV LOWER VASCULAR LEFT DISTAL FEMORAL COMPRESS: NORMAL
BH CV LOWER VASCULAR LEFT GASTRONEMIUS COMPRESS: NORMAL
BH CV LOWER VASCULAR LEFT GREATER SAPH AK COMPRESS: NORMAL
BH CV LOWER VASCULAR LEFT GREATER SAPH BK COMPRESS: NORMAL
BH CV LOWER VASCULAR LEFT LESSER SAPH COMPRESS: NORMAL
BH CV LOWER VASCULAR LEFT MID FEMORAL AUGMENT: NORMAL
BH CV LOWER VASCULAR LEFT MID FEMORAL COMPETENT: NORMAL
BH CV LOWER VASCULAR LEFT MID FEMORAL COMPRESS: NORMAL
BH CV LOWER VASCULAR LEFT MID FEMORAL PHASIC: NORMAL
BH CV LOWER VASCULAR LEFT MID FEMORAL SPONT: NORMAL
BH CV LOWER VASCULAR LEFT PERONEAL COMPRESS: NORMAL
BH CV LOWER VASCULAR LEFT POPLITEAL AUGMENT: NORMAL
BH CV LOWER VASCULAR LEFT POPLITEAL COMPETENT: NORMAL
BH CV LOWER VASCULAR LEFT POPLITEAL COMPRESS: NORMAL
BH CV LOWER VASCULAR LEFT POPLITEAL PHASIC: NORMAL
BH CV LOWER VASCULAR LEFT POPLITEAL SPONT: NORMAL
BH CV LOWER VASCULAR LEFT POSTERIOR TIBIAL COMPRESS: NORMAL
BH CV LOWER VASCULAR LEFT PROXIMAL FEMORAL COMPRESS: NORMAL
BH CV LOWER VASCULAR LEFT SAPHENOFEMORAL JUNCTION COMPRESS: NORMAL
BH CV LOWER VASCULAR RIGHT COMMON FEMORAL AUGMENT: NORMAL
BH CV LOWER VASCULAR RIGHT COMMON FEMORAL COMPETENT: NORMAL
BH CV LOWER VASCULAR RIGHT COMMON FEMORAL COMPRESS: NORMAL
BH CV LOWER VASCULAR RIGHT COMMON FEMORAL PHASIC: NORMAL
BH CV LOWER VASCULAR RIGHT COMMON FEMORAL SPONT: NORMAL
BH CV LOWER VASCULAR RIGHT DISTAL FEMORAL COMPRESS: NORMAL
BH CV LOWER VASCULAR RIGHT GASTRONEMIUS COMPRESS: NORMAL
BH CV LOWER VASCULAR RIGHT GREATER SAPH AK COMPRESS: NORMAL
BH CV LOWER VASCULAR RIGHT GREATER SAPH BK COMPRESS: NORMAL
BH CV LOWER VASCULAR RIGHT LESSER SAPH COMPRESS: NORMAL
BH CV LOWER VASCULAR RIGHT MID FEMORAL AUGMENT: NORMAL
BH CV LOWER VASCULAR RIGHT MID FEMORAL COMPETENT: NORMAL
BH CV LOWER VASCULAR RIGHT MID FEMORAL COMPRESS: NORMAL
BH CV LOWER VASCULAR RIGHT MID FEMORAL PHASIC: NORMAL
BH CV LOWER VASCULAR RIGHT MID FEMORAL SPONT: NORMAL
BH CV LOWER VASCULAR RIGHT PERONEAL COMPRESS: NORMAL
BH CV LOWER VASCULAR RIGHT POPLITEAL AUGMENT: NORMAL
BH CV LOWER VASCULAR RIGHT POPLITEAL COMPETENT: NORMAL
BH CV LOWER VASCULAR RIGHT POPLITEAL COMPRESS: NORMAL
BH CV LOWER VASCULAR RIGHT POPLITEAL PHASIC: NORMAL
BH CV LOWER VASCULAR RIGHT POPLITEAL SPONT: NORMAL
BH CV LOWER VASCULAR RIGHT POSTERIOR TIBIAL COMPRESS: NORMAL
BH CV LOWER VASCULAR RIGHT PROXIMAL FEMORAL COMPRESS: NORMAL
BH CV LOWER VASCULAR RIGHT SAPHENOFEMORAL JUNCTION COMPRESS: NORMAL
BILIRUB SERPL-MCNC: 0.4 MG/DL (ref 0–1.2)
BUN SERPL-MCNC: 30 MG/DL (ref 8–23)
BUN/CREAT SERPL: 31.9 (ref 7–25)
CALCIUM SPEC-SCNC: 8.5 MG/DL (ref 8.6–10.5)
CHLORIDE SERPL-SCNC: 105 MMOL/L (ref 98–107)
CO2 SERPL-SCNC: 27 MMOL/L (ref 22–29)
CREAT SERPL-MCNC: 0.94 MG/DL (ref 0.57–1)
CRP SERPL-MCNC: 1.21 MG/DL (ref 0–0.5)
DEPRECATED RDW RBC AUTO: 61.3 FL (ref 37–54)
EGFRCR SERPLBLD CKD-EPI 2021: 59.2 ML/MIN/1.73
EOSINOPHIL # BLD AUTO: 0.2 10*3/MM3 (ref 0–0.4)
EOSINOPHIL NFR BLD AUTO: 6.2 % (ref 0.3–6.2)
ERYTHROCYTE [DISTWIDTH] IN BLOOD BY AUTOMATED COUNT: 16.9 % (ref 12.3–15.4)
ERYTHROCYTE [SEDIMENTATION RATE] IN BLOOD: 25 MM/HR (ref 0–30)
GLOBULIN UR ELPH-MCNC: 3.3 GM/DL
GLUCOSE SERPL-MCNC: 85 MG/DL (ref 65–99)
HCT VFR BLD AUTO: 21.6 % (ref 34–46.6)
HCT VFR BLD AUTO: 22.2 % (ref 34–46.6)
HGB BLD-MCNC: 7.2 G/DL (ref 12–15.9)
HGB BLD-MCNC: 7.4 G/DL (ref 12–15.9)
INR PPP: 1.03 (ref 0.93–1.1)
LYMPHOCYTES # BLD AUTO: 0.9 10*3/MM3 (ref 0.7–3.1)
LYMPHOCYTES NFR BLD AUTO: 27.7 % (ref 19.6–45.3)
MAGNESIUM SERPL-MCNC: 2.2 MG/DL (ref 1.6–2.4)
MAXIMAL PREDICTED HEART RATE: 134 BPM
MCH RBC QN AUTO: 32.5 PG (ref 26.6–33)
MCHC RBC AUTO-ENTMCNC: 33.4 G/DL (ref 31.5–35.7)
MCV RBC AUTO: 97.3 FL (ref 79–97)
MONOCYTES # BLD AUTO: 0.3 10*3/MM3 (ref 0.1–0.9)
MONOCYTES NFR BLD AUTO: 8.8 % (ref 5–12)
NEUTROPHILS NFR BLD AUTO: 1.8 10*3/MM3 (ref 1.7–7)
NEUTROPHILS NFR BLD AUTO: 56.3 % (ref 42.7–76)
NRBC BLD AUTO-RTO: 0.1 /100 WBC (ref 0–0.2)
PHOSPHATE SERPL-MCNC: 4.9 MG/DL (ref 2.5–4.5)
PLATELET # BLD AUTO: 161 10*3/MM3 (ref 140–450)
PMV BLD AUTO: 7 FL (ref 6–12)
POTASSIUM SERPL-SCNC: 4.4 MMOL/L (ref 3.5–5.2)
PROCALCITONIN SERPL-MCNC: 0.11 NG/ML (ref 0–0.25)
PROT SERPL-MCNC: 6.4 G/DL (ref 6–8.5)
PROTHROMBIN TIME: 11 SECONDS (ref 9.6–11.7)
RBC # BLD AUTO: 2.28 10*6/MM3 (ref 3.77–5.28)
SODIUM SERPL-SCNC: 140 MMOL/L (ref 136–145)
STRESS TARGET HR: 114 BPM
WBC NRBC COR # BLD: 3.2 10*3/MM3 (ref 3.4–10.8)

## 2023-05-25 PROCEDURE — 85652 RBC SED RATE AUTOMATED: CPT | Performed by: INTERNAL MEDICINE

## 2023-05-25 PROCEDURE — 0D578ZZ DESTRUCTION OF STOMACH, PYLORUS, VIA NATURAL OR ARTIFICIAL OPENING ENDOSCOPIC: ICD-10-PCS | Performed by: INTERNAL MEDICINE

## 2023-05-25 PROCEDURE — G0378 HOSPITAL OBSERVATION PER HR: HCPCS

## 2023-05-25 PROCEDURE — 85610 PROTHROMBIN TIME: CPT | Performed by: NURSE PRACTITIONER

## 2023-05-25 PROCEDURE — 85014 HEMATOCRIT: CPT | Performed by: INTERNAL MEDICINE

## 2023-05-25 PROCEDURE — 93970 EXTREMITY STUDY: CPT

## 2023-05-25 PROCEDURE — 25010000002 PROPOFOL 200 MG/20ML EMULSION: Performed by: NURSE ANESTHETIST, CERTIFIED REGISTERED

## 2023-05-25 PROCEDURE — 84100 ASSAY OF PHOSPHORUS: CPT | Performed by: INTERNAL MEDICINE

## 2023-05-25 PROCEDURE — 94640 AIRWAY INHALATION TREATMENT: CPT

## 2023-05-25 PROCEDURE — 84145 PROCALCITONIN (PCT): CPT | Performed by: INTERNAL MEDICINE

## 2023-05-25 PROCEDURE — 85025 COMPLETE CBC W/AUTO DIFF WBC: CPT | Performed by: NURSE PRACTITIONER

## 2023-05-25 PROCEDURE — 0DJD8ZZ INSPECTION OF LOWER INTESTINAL TRACT, VIA NATURAL OR ARTIFICIAL OPENING ENDOSCOPIC: ICD-10-PCS | Performed by: INTERNAL MEDICINE

## 2023-05-25 PROCEDURE — 25010000002 NA FERRIC GLUC CPLX PER 12.5 MG: Performed by: NURSE PRACTITIONER

## 2023-05-25 PROCEDURE — 94761 N-INVAS EAR/PLS OXIMETRY MLT: CPT

## 2023-05-25 PROCEDURE — 94799 UNLISTED PULMONARY SVC/PX: CPT

## 2023-05-25 PROCEDURE — 83735 ASSAY OF MAGNESIUM: CPT | Performed by: INTERNAL MEDICINE

## 2023-05-25 PROCEDURE — 85018 HEMOGLOBIN: CPT | Performed by: INTERNAL MEDICINE

## 2023-05-25 PROCEDURE — 86140 C-REACTIVE PROTEIN: CPT | Performed by: INTERNAL MEDICINE

## 2023-05-25 PROCEDURE — 80053 COMPREHEN METABOLIC PANEL: CPT | Performed by: NURSE PRACTITIONER

## 2023-05-25 RX ORDER — SUCRALFATE 1 G/1
1 TABLET ORAL
Status: DISCONTINUED | OUTPATIENT
Start: 2023-05-25 | End: 2023-05-27 | Stop reason: HOSPADM

## 2023-05-25 RX ORDER — SODIUM CHLORIDE 9 MG/ML
INJECTION, SOLUTION INTRAVENOUS CONTINUOUS PRN
Status: DISCONTINUED | OUTPATIENT
Start: 2023-05-25 | End: 2023-05-25 | Stop reason: SURG

## 2023-05-25 RX ORDER — PANTOPRAZOLE SODIUM 40 MG/1
40 TABLET, DELAYED RELEASE ORAL
Status: DISCONTINUED | OUTPATIENT
Start: 2023-05-26 | End: 2023-05-27 | Stop reason: HOSPADM

## 2023-05-25 RX ORDER — PROPOFOL 10 MG/ML
INJECTION, EMULSION INTRAVENOUS AS NEEDED
Status: DISCONTINUED | OUTPATIENT
Start: 2023-05-25 | End: 2023-05-25 | Stop reason: SURG

## 2023-05-25 RX ORDER — HYDRALAZINE HYDROCHLORIDE 20 MG/ML
5 INJECTION INTRAMUSCULAR; INTRAVENOUS
Status: DISCONTINUED | OUTPATIENT
Start: 2023-05-25 | End: 2023-05-25 | Stop reason: HOSPADM

## 2023-05-25 RX ORDER — IPRATROPIUM BROMIDE AND ALBUTEROL SULFATE 2.5; .5 MG/3ML; MG/3ML
3 SOLUTION RESPIRATORY (INHALATION) ONCE AS NEEDED
Status: DISCONTINUED | OUTPATIENT
Start: 2023-05-25 | End: 2023-05-25 | Stop reason: HOSPADM

## 2023-05-25 RX ORDER — DIPHENHYDRAMINE HYDROCHLORIDE 50 MG/ML
12.5 INJECTION INTRAMUSCULAR; INTRAVENOUS
Status: DISCONTINUED | OUTPATIENT
Start: 2023-05-25 | End: 2023-05-25 | Stop reason: HOSPADM

## 2023-05-25 RX ORDER — LIDOCAINE HYDROCHLORIDE 20 MG/ML
INJECTION, SOLUTION EPIDURAL; INFILTRATION; INTRACAUDAL; PERINEURAL AS NEEDED
Status: DISCONTINUED | OUTPATIENT
Start: 2023-05-25 | End: 2023-05-25 | Stop reason: SURG

## 2023-05-25 RX ORDER — LABETALOL HYDROCHLORIDE 5 MG/ML
5 INJECTION, SOLUTION INTRAVENOUS
Status: DISCONTINUED | OUTPATIENT
Start: 2023-05-25 | End: 2023-05-25 | Stop reason: HOSPADM

## 2023-05-25 RX ORDER — ONDANSETRON 2 MG/ML
4 INJECTION INTRAMUSCULAR; INTRAVENOUS ONCE AS NEEDED
Status: DISCONTINUED | OUTPATIENT
Start: 2023-05-25 | End: 2023-05-25 | Stop reason: HOSPADM

## 2023-05-25 RX ORDER — EPHEDRINE SULFATE 5 MG/ML
5 INJECTION INTRAVENOUS ONCE AS NEEDED
Status: DISCONTINUED | OUTPATIENT
Start: 2023-05-25 | End: 2023-05-25 | Stop reason: HOSPADM

## 2023-05-25 RX ADMIN — PROPOFOL 20 MG: 10 INJECTION, EMULSION INTRAVENOUS at 14:04

## 2023-05-25 RX ADMIN — ROPINIROLE HYDROCHLORIDE 2.75 MG: 1 TABLET, FILM COATED ORAL at 23:30

## 2023-05-25 RX ADMIN — FERROUS SULFATE TAB EC 324 MG (65 MG FE EQUIVALENT) 324 MG: 324 (65 FE) TABLET DELAYED RESPONSE at 15:01

## 2023-05-25 RX ADMIN — CARBIDOPA AND LEVODOPA 2 TABLET: 25; 100 TABLET ORAL at 15:01

## 2023-05-25 RX ADMIN — BUDESONIDE AND FORMOTEROL FUMARATE DIHYDRATE 2 PUFF: 160; 4.5 AEROSOL RESPIRATORY (INHALATION) at 07:42

## 2023-05-25 RX ADMIN — PROPOFOL 20 MG: 10 INJECTION, EMULSION INTRAVENOUS at 14:00

## 2023-05-25 RX ADMIN — DULOXETINE HYDROCHLORIDE 60 MG: 30 CAPSULE, DELAYED RELEASE ORAL at 22:22

## 2023-05-25 RX ADMIN — FUROSEMIDE 40 MG: 40 TABLET ORAL at 15:01

## 2023-05-25 RX ADMIN — Medication 10 ML: at 22:24

## 2023-05-25 RX ADMIN — PROPOFOL 10 MG: 10 INJECTION, EMULSION INTRAVENOUS at 13:59

## 2023-05-25 RX ADMIN — Medication 10 ML: at 09:28

## 2023-05-25 RX ADMIN — DICLOFENAC SODIUM 2 G: 10 GEL TOPICAL at 22:24

## 2023-05-25 RX ADMIN — PROPOFOL 20 MG: 10 INJECTION, EMULSION INTRAVENOUS at 13:55

## 2023-05-25 RX ADMIN — CARBIDOPA AND LEVODOPA 2 TABLET: 25; 100 TABLET ORAL at 22:22

## 2023-05-25 RX ADMIN — ALLOPURINOL 100 MG: 100 TABLET ORAL at 15:01

## 2023-05-25 RX ADMIN — GABAPENTIN 300 MG: 300 CAPSULE ORAL at 22:22

## 2023-05-25 RX ADMIN — PROPOFOL 20 MG: 10 INJECTION, EMULSION INTRAVENOUS at 13:57

## 2023-05-25 RX ADMIN — GABAPENTIN 300 MG: 300 CAPSULE ORAL at 09:28

## 2023-05-25 RX ADMIN — CARBIDOPA AND LEVODOPA 2 TABLET: 25; 100 TABLET ORAL at 09:28

## 2023-05-25 RX ADMIN — LIDOCAINE HYDROCHLORIDE 80 MG: 20 INJECTION, SOLUTION EPIDURAL; INFILTRATION; INTRACAUDAL; PERINEURAL at 13:55

## 2023-05-25 RX ADMIN — PROPOFOL 20 MG: 10 INJECTION, EMULSION INTRAVENOUS at 14:06

## 2023-05-25 RX ADMIN — BUDESONIDE AND FORMOTEROL FUMARATE DIHYDRATE 2 PUFF: 160; 4.5 AEROSOL RESPIRATORY (INHALATION) at 20:13

## 2023-05-25 RX ADMIN — Medication 400 MG: at 15:01

## 2023-05-25 RX ADMIN — SODIUM CHLORIDE 250 MG: 9 INJECTION, SOLUTION INTRAVENOUS at 17:27

## 2023-05-25 RX ADMIN — GABAPENTIN 300 MG: 300 CAPSULE ORAL at 15:01

## 2023-05-25 RX ADMIN — SUCRALFATE 1 G: 1 TABLET ORAL at 22:22

## 2023-05-25 RX ADMIN — HYDROCODONE BITARTRATE AND ACETAMINOPHEN 1 TABLET: 10; 325 TABLET ORAL at 03:06

## 2023-05-25 RX ADMIN — SUCRALFATE 1 G: 1 TABLET ORAL at 17:18

## 2023-05-25 RX ADMIN — SODIUM CHLORIDE: 9 INJECTION, SOLUTION INTRAVENOUS at 13:38

## 2023-05-25 RX ADMIN — PROPOFOL 20 MG: 10 INJECTION, EMULSION INTRAVENOUS at 14:02

## 2023-05-25 RX ADMIN — ROPINIROLE HYDROCHLORIDE 2.75 MG: 1 TABLET, FILM COATED ORAL at 15:09

## 2023-05-25 RX ADMIN — PANTOPRAZOLE SODIUM 40 MG: 40 INJECTION, POWDER, LYOPHILIZED, FOR SOLUTION INTRAVENOUS at 09:28

## 2023-05-25 NOTE — CASE MANAGEMENT/SOCIAL WORK
Discharge Planning Assessment   [facility]     Patient Name: Lorenza Owens  MRN: [id number]  Today's Date: 5/25/2023    Admit Date: 5/24/2023    Plan: Return to [facility]. No pre-cert or PASRR required.   Discharge Needs Assessment     Row Name 05/25/23 1420       Living Environment    People in Home facility resident  [facility]    Current Living Arrangements extended care facility    Potentially Unsafe Housing Conditions none    Primary Care Provided by other (see comments)  Facility staff    Provides Primary Care For no one    Family Caregiver if Needed other (see comments)  Facility staff    Able to Return to Prior Arrangements yes       Resource/Environmental Concerns    Resource/Environmental Concerns none    Transportation Concerns none       Transition Planning    Patient/Family Anticipates Transition to long-term care facility    Patient/Family Anticipated Services at Transition none    Transportation Anticipated family or friend will provide       Discharge Needs Assessment    Readmission Within the Last 30 Days no previous admission in last 30 days    Equipment Currently Used at Home wheelchair;oxygen    Concerns to be Addressed discharge planning    Anticipated Changes Related to Illness none    Equipment Needed After Discharge none               Discharge Plan     Row Name 05/25/23 1421       Plan    Plan Return to [facility]. No pre-cert or PASRR required.    Patient/Family in Agreement with Plan yes    Plan Comments Patient is LTC resident at [facility]. CM spoke with daughter Vicky, per daughter, would like patient to return to [facility]. Per daughter, she will transport patient in their own WC van. Patient is completely dependent. Per liaison Tegan, patient will return to [facility]. No pre-cert or PASRR required. D/C barriers: 5/25-EGD, low hgb.              Continued Care and Services - Admitted Since 5/24/2023     Destination Coordination complete.    Service Provider  Request Status Selected Services Address Phone Fax Patient Preferred    [facility] [address] [place] [address] [phone] --               Demographic Summary     Row Name 05/25/23 1419       General Information    Admission Type observation    Arrived From emergency department    Required Notices Provided Observation Status Notice    Referral Source admission list    Reason for Consult discharge planning    Preferred Language English               Functional Status     Row Name 05/25/23 1419       Functional Status    Usual Activity Tolerance fair    Current Activity Tolerance fair       Functional Status, IADL    Medications completely dependent    Meal Preparation completely dependent    Housekeeping completely dependent    Laundry completely dependent    Shopping completely dependent                    Patient Forms     Row Name 05/25/23 1317       Patient Forms    Important Message from Medicare (IMM) --  Pepper reviewed with daughter, v/u, declined copy    Patient Observation Letter Delivered  Pepper reviewed with daughter, v/u, declined copy    Delivered to Support person    Method of delivery Telephone                Phone communication or documentation only - no physical contact with patient or family.    Sofya Hu RN, BSN  Obs/3C/[place]   [address] [address]  Phone: [phone]  Fax: [phone]

## 2023-05-25 NOTE — ANESTHESIA PREPROCEDURE EVALUATION
Anesthesia Evaluation     Patient summary reviewed and Nursing notes reviewed   NPO Solid Status: > 8 hours             Airway   Mallampati: II  TM distance: >3 FB  Neck ROM: full  Dental    (+) edentulous    Pulmonary    (+) pneumonia resolved , sleep apnea, decreased breath sounds,   Cardiovascular - normal exam    (+) PVD, DVT resolved, hyperlipidemia,       Neuro/Psych  (+) weakness, psychiatric history,    GI/Hepatic/Renal/Endo    (+) morbid obesity, GERD well controlled, GI bleeding upper and lower active bleeding, renal disease CRI, thyroid problem hypothyroidism    Musculoskeletal     (+) chronic pain, gait problem, myalgias,   Abdominal   (+) obese,    Substance History      OB/GYN          Other   arthritis, blood dyscrasia anemia,   history of cancer remission                    Anesthesia Plan    ASA 3     general     intravenous induction     Anesthetic plan, risks, benefits, and alternatives have been provided, discussed and informed consent has been obtained with: patient.    Use of blood products discussed with patient .   Plan discussed with CRNA.        CODE STATUS:    Level Of Support Discussed With: Patient  Code Status (Patient has no pulse and is not breathing): No CPR (Do Not Attempt to Resuscitate)  Medical Interventions (Patient has pulse or is breathing): Full Support

## 2023-05-25 NOTE — DISCHARGE PLACEMENT REQUEST
"Lorenza Lee \"Avril\" (88 y.o. Female)     Date of Birth   1935    Social Security Number       Address   [place] [facility] [address] [place] [address] [address] [place] [address]    Home Phone   [phone]    MRN   [id number]       Restoration   [employer]    Marital Status                               Admission Date   5/24/23    Admission Type   Emergency    Admitting Provider   Kimo Henderson MD    Attending Provider   Kimo Henderson MD    Department, Room/Bed   [facility] 3C MEDICAL INPATIENT, 382/1       Discharge Date       Discharge Disposition       Discharge Destination                               Attending Provider: Kimo Henderson MD    Allergies: Latex, Morphine    Isolation: None   Infection: None   Code Status: No CPR    Ht: 152.4 cm (60\")   Wt: 95.7 kg (211 lb)    Admission Cmt: None   Principal Problem: Gastrointestinal hemorrhage, unspecified gastrointestinal hemorrhage type [K92.2]                 Active Insurance as of 5/24/2023     Primary Coverage     Payor Plan Insurance Group Employer/Plan Group    MEDICARE MEDICARE A & B      Payor Plan Address Payor Plan Phone Number Payor Plan Fax Number Effective Dates    [address] [phone]  6/1/1998 - None Entered    [facility] [address]       Subscriber Name Subscriber Birth Date Member ID       LORENZA LEE 1935 [id number]           Secondary Coverage     Payor Plan Insurance Group Employer/Plan Group    AARP MC SUP AARP HEALTH CARE OPTIONS      Payor Plan Address Payor Plan Phone Number Payor Plan Fax Number Effective Dates    [facility] [phone]  1/1/2019 - None Entered    [address]       [facility] [address]       Subscriber Name Subscriber Birth Date Member ID       LORENZA LEE 1935 [id number]           Tertiary Coverage     Payor Plan Insurance Group Employer/Plan Group    [place]      Payor Plan Address Payor Plan Phone Number Payor Plan Fax Number Effective Dates    [address]   7/1/2021 - None Entered    [place] [address]       Subscriber Name Subscriber Birth Date Member ID       JL LEE 1935 [id number]                 Emergency Contacts      (Rel.) Home Phone Work Phone Mobile Phone    IDA PLATT (Daughter) [phone] -- [phone]    Vicky Krueger (Daughter) [phone] -- [phone]    MELANIE NUNEZ (Daughter) -- -- [phone]

## 2023-05-25 NOTE — OP NOTE
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  They were placed in the left lateral decubitus position and IV conscious sedation was administered by anesthesia while being monitored continuously throughout the procedure.  The video Olympus colonoscope was utilized for EGD and small bowel enteroscopy and was introduced into the esophagus and advanced under direct vision all the way into the proximal jejunum at about 160 cm.  I encountered resistance at this point and could advance no further.  The prep was excellent on slow withdrawal close approach and showed no ischemic, vascular, inflammatory, or bleeding lesions in the jejunum more for portions of the duodenum.  The bulb is free of any ulceration and the pylorus is patent.  In the antrum and distal body of the stomach there appears to be early gastric antral vascular ectasia.  Photodocumentation was obtained and then APC cautery was used to obliterate the larger vascular ectasias.  The stomach body fundus and cardia were normal and retroflexed view showed no varices.  The squamocolumnar line to the GE junction  there is no erosive esophagitis.  The remaining esophagus was normal the scope was removed she tolerated the procedure well returned to recovery good condition.    Impression:  Gastric antral vascular ectasia which certainly could be a source for iron deficiency anemia status post APC cautery.    Recommendations:  We will give intravenous iron as she has been on oral iron and still has a low iron saturation of 18%  As an outpatient I would monitor her H&H and iron stores and replenish intravenously as needed while continuing oral iron  Call for any postop problems  We will place on Carafate 1 g 4 times daily before meals and at bedtime for 14 days for the post APC erosions and ulcerations that will be created from therapeutic endoscopy  Regular diet  We will see as needed and she can follow-up with GI as an outpatient.      Hamlet Proctor MD     Date: 5/25/2023  Time: 14:09 EDT

## 2023-05-25 NOTE — ANESTHESIA POSTPROCEDURE EVALUATION
Patient: Lorenza Owens    Procedure Summary     Date: 05/25/23 Room / Location: [facility] ENDOSCOPY 1 / [facility] ENDOSCOPY    Anesthesia Start: 1338 Anesthesia Stop: 1410    Procedure: ESOPHAGOGASTRODUODENOSCOPY WITH SMALL BOWEL ENTEROSCOPY AND APC OF GAVE Diagnosis:       Gastrointestinal hemorrhage, unspecified gastrointestinal hemorrhage type      (Gastrointestinal hemorrhage, unspecified gastrointestinal hemorrhage type [K92.2])    Surgeons: Hamlet Proctor MD Provider: Jonathan Hernández MD    Anesthesia Type: general ASA Status: 3          Anesthesia Type: general    Vitals  Vitals Value Taken Time   /58 05/25/23 1432   Temp     Pulse 73 05/25/23 1436   Resp     SpO2 100 % 05/25/23 1436   Vitals shown include unvalidated device data.        Post Anesthesia Care and Evaluation    Patient location during evaluation: PHASE II  Patient participation: complete - patient participated  Level of consciousness: awake  Pain score: 0  Pain management: adequate  Anesthetic complications: No anesthetic complications  PONV Status: none  Cardiovascular status: acceptable  Respiratory status: acceptable  Hydration status: acceptable

## 2023-05-25 NOTE — PROGRESS NOTES
[facility] Medicine Services Daily Progress Note    Patient Name: Lorenza Owens  : 1935  MRN: [id number]  Primary Care Physician:  Shikha Mcmahon APRN  Date of admission: 2023      Subjective      Chief Complaint: Black stools    Patient Reports she is waiting for the procedure to be done.  Her hemoglobin is 7-8 range.  Denies any pain.    ROS negative except as above      Objective      Vitals:   Temp:  [97.4 °F (36.3 °C)-98.7 °F (37.1 °C)] 98 °F (36.7 °C)  Heart Rate:  [73-85] 74  Resp:  [14-19] 15  BP: ()/(41-59) 96/49    Physical Exam  Vitals reviewed.   HENT:      Head: Normocephalic.      Nose: Nose normal.      Mouth/Throat:      Mouth: Mucous membranes are moist.   Cardiovascular:      Rate and Rhythm: Normal rate and regular rhythm.      Pulses: Normal pulses.      Heart sounds: Normal heart sounds.   Pulmonary:      Effort: Pulmonary effort is normal.      Breath sounds: Normal breath sounds.   Abdominal:      General: Abdomen is flat.      Palpations: Abdomen is soft.   Musculoskeletal:         General: Normal range of motion.      Cervical back: Normal range of motion.   Skin:     General: Skin is warm.   Neurological:      General: No focal deficit present.      Mental Status: She is alert and oriented to person, place, and time.   Psychiatric:         Mood and Affect: Mood normal.         Behavior: Behavior normal.         Result Review    Result Review:  I have personally reviewed the results from the time of this admission to 2023 15:34 EDT and agree with these findings:  [x]  Laboratory  []  Microbiology  []  Radiology  []  EKG/Telemetry   []  Cardiology/Vascular   []  Pathology  []  Old records  []  Other:  Most notable findings include: hb 7.4    Assessment & Plan      Brief Patient Summary:  Lorenza Owens is a 88 y.o. female who presents with GI bleed      allopurinol, 100 mg, Oral, Daily  budesonide-formoterol, 2 puff, Inhalation, BID -  RT  carbidopa-levodopa, 2 tablet, Oral, TID  cetirizine, 10 mg, Oral, Nightly  Diclofenac Sodium, 2 g, Topical, BID  DULoxetine, 60 mg, Oral, Nightly  ferric gluconate, 250 mg, Intravenous, Once  ferrous sulfate, 324 mg, Oral, Daily With Breakfast  furosemide, 40 mg, Oral, Daily  gabapentin, 300 mg, Oral, TID  levothyroxine, 137 mcg, Oral, Q AM  magnesium oxide, 400 mg, Oral, Every Other Day  [START ON 5/26/2023] pantoprazole, 40 mg, Oral, Q AM  rOPINIRole, 2.75 mg, Oral, Q8H  sodium chloride, 10 mL, Intravenous, Q12H  sucralfate, 1 g, Oral, 4x Daily AC & at Bedtime             Active Hospital Problems:  Active Hospital Problems    Diagnosis    • **Gastrointestinal hemorrhage, unspecified gastrointestinal hemorrhage type      Plan:      Hematochezia  H/O gastric and duodenal AVMs  - black stool that is guaiac positive in ED, not on any anticoagulation  - Hgb 7.7, baseline appears around 8  - Monitor H&H  - GI Consulted   -Status post scope and treatment of areas of ectasia  - PPI Carafate ordered  -IV iron ordered  -Back to [facility] tomorrow if stable     Left lower extremity weakness  PVD  - Warm to touch and some swelling  - Venous doppler ordered     Parkinson's Disease  Wheelchair dependent  - Patient reports she has not had physical therapy in years  - Resume Sinemet     Fibromyalgia  -Resume Voltaren, Cymbalta, Norco as needed, Requip and Neurontin  -Hold fentanyl patch as patient is drowsy  -Fall precautions     Hypothyroidism  - Check TSH  - Resume Synthroid     GERD: PPI  Hyperlipidemia: Check lipid panel  Obstructive sleep apnea     DVT prophylaxis:  No DVT prophylaxis order currently exists.     CODE STATUS:    Admission Status:  I believe this patient meets observation status.     I discussed the patient's findings and my recommendations with patient and family. 05/25/23      Electronically signed by Kimo Henderson MD, 05/25/23, 15:34 EDT.  [facility] Hospitalist Team

## 2023-05-25 NOTE — PLAN OF CARE
Goal Outcome Evaluation:  Plan of Care Reviewed With: patient        Progress: no change  Outcome Evaluation: pt admitted from [facility] d/t abnormal labs dark stool, and fatique/weakness, hgb currently 7.5,  pt has slept on and off tonight, complaints of LLE pain- see MAR, pt reminded to turn throughout the night, pt to have duplex in am and npo for egd at 1, no new complaints at this time.

## 2023-05-25 NOTE — NURSING NOTE
WOCN note:    WOCN consult received for left heel wound. Patient is currently off the floor in Endoscopy. Will follow up later today or tomorrow.

## 2023-05-25 NOTE — PLAN OF CARE
Goal Outcome Evaluation:   Patient went for EGD today. Patient VSS. Continue to monitor.

## 2023-05-26 LAB
ALBUMIN SERPL-MCNC: 3.3 G/DL (ref 3.5–5.2)
ALBUMIN/GLOB SERPL: 1 G/DL
ALP SERPL-CCNC: 80 U/L (ref 39–117)
ALT SERPL W P-5'-P-CCNC: <5 U/L (ref 1–33)
ANION GAP SERPL CALCULATED.3IONS-SCNC: 9 MMOL/L (ref 5–15)
AST SERPL-CCNC: 13 U/L (ref 1–32)
BASOPHILS # BLD AUTO: 0 10*3/MM3 (ref 0–0.2)
BASOPHILS NFR BLD AUTO: 0.8 % (ref 0–1.5)
BILIRUB SERPL-MCNC: 0.4 MG/DL (ref 0–1.2)
BUN SERPL-MCNC: 30 MG/DL (ref 8–23)
BUN/CREAT SERPL: 31.3 (ref 7–25)
CALCIUM SPEC-SCNC: 8.5 MG/DL (ref 8.6–10.5)
CHLORIDE SERPL-SCNC: 102 MMOL/L (ref 98–107)
CO2 SERPL-SCNC: 27 MMOL/L (ref 22–29)
CREAT SERPL-MCNC: 0.96 MG/DL (ref 0.57–1)
DEPRECATED RDW RBC AUTO: 57.3 FL (ref 37–54)
EGFRCR SERPLBLD CKD-EPI 2021: 57.7 ML/MIN/1.73
EOSINOPHIL # BLD AUTO: 0.1 10*3/MM3 (ref 0–0.4)
EOSINOPHIL NFR BLD AUTO: 4.1 % (ref 0.3–6.2)
ERYTHROCYTE [DISTWIDTH] IN BLOOD BY AUTOMATED COUNT: 16.6 % (ref 12.3–15.4)
GLOBULIN UR ELPH-MCNC: 3.4 GM/DL
GLUCOSE SERPL-MCNC: 94 MG/DL (ref 65–99)
HCT VFR BLD AUTO: 22.3 % (ref 34–46.6)
HCT VFR BLD AUTO: 26 % (ref 34–46.6)
HGB BLD-MCNC: 7.3 G/DL (ref 12–15.9)
HGB BLD-MCNC: 8.5 G/DL (ref 12–15.9)
LYMPHOCYTES # BLD AUTO: 0.6 10*3/MM3 (ref 0.7–3.1)
LYMPHOCYTES NFR BLD AUTO: 17.2 % (ref 19.6–45.3)
MAGNESIUM SERPL-MCNC: 2.4 MG/DL (ref 1.6–2.4)
MCH RBC QN AUTO: 32.1 PG (ref 26.6–33)
MCHC RBC AUTO-ENTMCNC: 32.8 G/DL (ref 31.5–35.7)
MCV RBC AUTO: 97.8 FL (ref 79–97)
MONOCYTES # BLD AUTO: 0.3 10*3/MM3 (ref 0.1–0.9)
MONOCYTES NFR BLD AUTO: 7.5 % (ref 5–12)
NEUTROPHILS NFR BLD AUTO: 2.5 10*3/MM3 (ref 1.7–7)
NEUTROPHILS NFR BLD AUTO: 70.4 % (ref 42.7–76)
NRBC BLD AUTO-RTO: 0 /100 WBC (ref 0–0.2)
PHOSPHATE SERPL-MCNC: 4.9 MG/DL (ref 2.5–4.5)
PLATELET # BLD AUTO: 166 10*3/MM3 (ref 140–450)
PMV BLD AUTO: 7.1 FL (ref 6–12)
POTASSIUM SERPL-SCNC: 4 MMOL/L (ref 3.5–5.2)
PROT SERPL-MCNC: 6.7 G/DL (ref 6–8.5)
QT INTERVAL: 406 MS
RBC # BLD AUTO: 2.28 10*6/MM3 (ref 3.77–5.28)
SODIUM SERPL-SCNC: 138 MMOL/L (ref 136–145)
WBC NRBC COR # BLD: 3.5 10*3/MM3 (ref 3.4–10.8)

## 2023-05-26 PROCEDURE — 94799 UNLISTED PULMONARY SVC/PX: CPT

## 2023-05-26 PROCEDURE — 85014 HEMATOCRIT: CPT | Performed by: INTERNAL MEDICINE

## 2023-05-26 PROCEDURE — 94761 N-INVAS EAR/PLS OXIMETRY MLT: CPT

## 2023-05-26 PROCEDURE — 86900 BLOOD TYPING SEROLOGIC ABO: CPT

## 2023-05-26 PROCEDURE — 80053 COMPREHEN METABOLIC PANEL: CPT | Performed by: INTERNAL MEDICINE

## 2023-05-26 PROCEDURE — P9016 RBC LEUKOCYTES REDUCED: HCPCS

## 2023-05-26 PROCEDURE — 85025 COMPLETE CBC W/AUTO DIFF WBC: CPT | Performed by: NURSE PRACTITIONER

## 2023-05-26 PROCEDURE — 85018 HEMOGLOBIN: CPT | Performed by: INTERNAL MEDICINE

## 2023-05-26 PROCEDURE — 84100 ASSAY OF PHOSPHORUS: CPT | Performed by: INTERNAL MEDICINE

## 2023-05-26 PROCEDURE — 83735 ASSAY OF MAGNESIUM: CPT | Performed by: INTERNAL MEDICINE

## 2023-05-26 PROCEDURE — 36430 TRANSFUSION BLD/BLD COMPNT: CPT

## 2023-05-26 PROCEDURE — 94664 DEMO&/EVAL PT USE INHALER: CPT

## 2023-05-26 RX ORDER — CHOLECALCIFEROL (VITAMIN D3) 125 MCG
5 CAPSULE ORAL NIGHTLY PRN
Status: DISCONTINUED | OUTPATIENT
Start: 2023-05-26 | End: 2023-05-27 | Stop reason: HOSPADM

## 2023-05-26 RX ORDER — MIDODRINE HYDROCHLORIDE 5 MG/1
5 TABLET ORAL
Status: DISCONTINUED | OUTPATIENT
Start: 2023-05-26 | End: 2023-05-27 | Stop reason: HOSPADM

## 2023-05-26 RX ADMIN — ROPINIROLE HYDROCHLORIDE 2.75 MG: 1 TABLET, FILM COATED ORAL at 13:19

## 2023-05-26 RX ADMIN — GABAPENTIN 300 MG: 300 CAPSULE ORAL at 09:22

## 2023-05-26 RX ADMIN — MIDODRINE HYDROCHLORIDE 5 MG: 5 TABLET ORAL at 09:21

## 2023-05-26 RX ADMIN — DICLOFENAC SODIUM 2 G: 10 GEL TOPICAL at 20:55

## 2023-05-26 RX ADMIN — SUCRALFATE 1 G: 1 TABLET ORAL at 13:19

## 2023-05-26 RX ADMIN — ALLOPURINOL 100 MG: 100 TABLET ORAL at 09:21

## 2023-05-26 RX ADMIN — HYDROCODONE BITARTRATE AND ACETAMINOPHEN 1 TABLET: 10; 325 TABLET ORAL at 05:18

## 2023-05-26 RX ADMIN — SUCRALFATE 1 G: 1 TABLET ORAL at 09:18

## 2023-05-26 RX ADMIN — DULOXETINE HYDROCHLORIDE 60 MG: 30 CAPSULE, DELAYED RELEASE ORAL at 20:52

## 2023-05-26 RX ADMIN — ROPINIROLE HYDROCHLORIDE 2.75 MG: 1 TABLET, FILM COATED ORAL at 05:06

## 2023-05-26 RX ADMIN — CARBIDOPA AND LEVODOPA 2 TABLET: 25; 100 TABLET ORAL at 09:22

## 2023-05-26 RX ADMIN — SUCRALFATE 1 G: 1 TABLET ORAL at 16:46

## 2023-05-26 RX ADMIN — MIDODRINE HYDROCHLORIDE 5 MG: 5 TABLET ORAL at 16:46

## 2023-05-26 RX ADMIN — DICLOFENAC SODIUM 2 G: 10 GEL TOPICAL at 09:24

## 2023-05-26 RX ADMIN — FERROUS SULFATE TAB EC 324 MG (65 MG FE EQUIVALENT) 324 MG: 324 (65 FE) TABLET DELAYED RESPONSE at 09:21

## 2023-05-26 RX ADMIN — GABAPENTIN 300 MG: 300 CAPSULE ORAL at 20:52

## 2023-05-26 RX ADMIN — SUCRALFATE 1 G: 1 TABLET ORAL at 20:52

## 2023-05-26 RX ADMIN — SODIUM CHLORIDE 250 ML: 9 INJECTION, SOLUTION INTRAVENOUS at 09:27

## 2023-05-26 RX ADMIN — PANTOPRAZOLE SODIUM 40 MG: 40 TABLET, DELAYED RELEASE ORAL at 05:06

## 2023-05-26 RX ADMIN — CETIRIZINE HYDROCHLORIDE 10 MG: 10 TABLET, FILM COATED ORAL at 20:52

## 2023-05-26 RX ADMIN — BUDESONIDE AND FORMOTEROL FUMARATE DIHYDRATE 2 PUFF: 160; 4.5 AEROSOL RESPIRATORY (INHALATION) at 08:14

## 2023-05-26 RX ADMIN — LEVOTHYROXINE SODIUM 137 MCG: 0.11 TABLET ORAL at 05:06

## 2023-05-26 RX ADMIN — FUROSEMIDE 40 MG: 40 TABLET ORAL at 09:21

## 2023-05-26 RX ADMIN — HYDROCODONE BITARTRATE AND ACETAMINOPHEN 1 TABLET: 10; 325 TABLET ORAL at 22:55

## 2023-05-26 RX ADMIN — Medication 10 ML: at 09:24

## 2023-05-26 RX ADMIN — ROPINIROLE HYDROCHLORIDE 2.75 MG: 1 TABLET, FILM COATED ORAL at 21:02

## 2023-05-26 RX ADMIN — CARBIDOPA AND LEVODOPA 2 TABLET: 25; 100 TABLET ORAL at 16:46

## 2023-05-26 RX ADMIN — CARBIDOPA AND LEVODOPA 2 TABLET: 25; 100 TABLET ORAL at 20:52

## 2023-05-26 RX ADMIN — Medication 5 MG: at 23:14

## 2023-05-26 RX ADMIN — GABAPENTIN 300 MG: 300 CAPSULE ORAL at 16:46

## 2023-05-26 NOTE — ANESTHESIA POSTPROCEDURE EVALUATION
Patient: Lorenza Owens    Procedure Summary     Date: 05/25/23 Room / Location: [facility] ENDOSCOPY 1 / [facility] ENDOSCOPY    Anesthesia Start: 1338 Anesthesia Stop: 1410    Procedure: ESOPHAGOGASTRODUODENOSCOPY WITH SMALL BOWEL ENTEROSCOPY AND APC OF GAVE Diagnosis:       Gastrointestinal hemorrhage, unspecified gastrointestinal hemorrhage type      (Gastrointestinal hemorrhage, unspecified gastrointestinal hemorrhage type [K92.2])    Surgeons: Hamlet Proctor MD Provider: Jonathan Hernández MD    Anesthesia Type: general ASA Status: 3          Anesthesia Type: general    Vitals  Vitals Value Taken Time   /58 05/25/23 1432   Temp     Pulse 73 05/25/23 1436   Resp     SpO2 100 % 05/25/23 1436   Vitals shown include unvalidated device data.        Post Anesthesia Care and Evaluation    Patient location during evaluation: PACU  Patient participation: complete - patient participated  Level of consciousness: awake  Pain scale: See nurse's notes for pain score.  Pain management: adequate    Airway patency: patent  Anesthetic complications: No anesthetic complications  PONV Status: none  Cardiovascular status: acceptable  Respiratory status: acceptable and spontaneous ventilation  Hydration status: acceptable    Comments: Patient seen and examined postoperatively; vital signs stable; SpO2 greater than or equal to 90%; cardiopulmonary status stable; nausea/vomiting adequately controlled; pain adequately controlled; no apparent anesthesia complications; patient discharged from anesthesia care when discharge criteria were met

## 2023-05-26 NOTE — PLAN OF CARE
Goal Outcome Evaluation:  Plan of Care Reviewed With: patient        Progress: no change  Outcome Evaluation: pt has slept on and off tonight, complaints of pain this evening relieved with position changed, pt turned throughout the night, egd completed this evening and IV iron started, LE doppler negative, Hgb remains greater than 7, possible d/c back to [facility] in the am

## 2023-05-26 NOTE — CASE MANAGEMENT/SOCIAL WORK
Continued Stay Note  [facility]     Patient Name: Lorenza Owens  MRN: [id number]  Today's Date: 5/26/2023    Admit Date: 5/24/2023    Plan: Return to [facility]. No pre-cert or PASRR required.   Discharge Plan     Row Name 05/26/23 1002       Plan    Plan Return to [facility]. No pre-cert or PASRR required.    Plan Comments Patient will return to [facility]. No pre-cert or PASRR required. Patient's family will transport with their personal WC van. CM informed floor RN. D/C barriers: low hgb              Phone communication or documentation only - no physical contact with patient or family.    Sofya Hu, RN, BSN  Obs/3C/[place]   [address] [address]  Phone: [phone]  Fax: [phone]

## 2023-05-26 NOTE — PLAN OF CARE
Goal Outcome Evaluation:    Patient BP has been low. MD ordered Bolus, midodrine, and blood they were given. Will continue to monitor.

## 2023-05-26 NOTE — PROGRESS NOTES
[facility] Medicine Services Daily Progress Note    Patient Name: Lorenza Owens  : 1935  MRN: [id number]  Primary Care Physician:  Shikha Mcmahon APRN  Date of admission: 2023    Subjective      Chief Complaint: Black stools    Patient Reports she is waiting for the procedure to be done.  Her hemoglobin is 7-8 range.  Denies any pain.    ROS negative except as above    Objective      Vitals:   Temp:  [97 °F (36.1 °C)-98.8 °F (37.1 °C)] 98.6 °F (37 °C)  Heart Rate:  [73-82] 76  Resp:  [13-20] 15  BP: ()/(29-50) 107/41    Physical Exam  Vitals reviewed.   HENT:      Head: Normocephalic.      Nose: Nose normal.      Mouth/Throat:      Mouth: Mucous membranes are moist.   Cardiovascular:      Rate and Rhythm: Normal rate and regular rhythm.      Pulses: Normal pulses.      Heart sounds: Normal heart sounds.   Pulmonary:      Effort: Pulmonary effort is normal.      Breath sounds: Normal breath sounds.   Abdominal:      General: Abdomen is flat.      Palpations: Abdomen is soft.   Musculoskeletal:         General: Normal range of motion.      Cervical back: Normal range of motion.   Skin:     General: Skin is warm.   Neurological:      General: No focal deficit present.      Mental Status: She is alert and oriented to person, place, and time.   Psychiatric:         Mood and Affect: Mood normal.         Behavior: Behavior normal.       Result Review    Result Review:  I have personally reviewed the results from the time of this admission to 2023 16:36 EDT and agree with these findings:  [x]  Laboratory  []  Microbiology  []  Radiology  []  EKG/Telemetry   []  Cardiology/Vascular   []  Pathology  []  Old records  []  Other:  Most notable findings include: hb 7.3    Assessment & Plan      Brief Patient Summary:  Lorenza Owens is a 88 y.o. female who presents with GI bleed      allopurinol, 100 mg, Oral, Daily  budesonide-formoterol, 2 puff, Inhalation, BID -  RT  carbidopa-levodopa, 2 tablet, Oral, TID  cetirizine, 10 mg, Oral, Nightly  Diclofenac Sodium, 2 g, Topical, BID  DULoxetine, 60 mg, Oral, Nightly  ferrous sulfate, 324 mg, Oral, Daily With Breakfast  furosemide, 40 mg, Oral, Daily  gabapentin, 300 mg, Oral, TID  levothyroxine, 137 mcg, Oral, Q AM  magnesium oxide, 400 mg, Oral, Every Other Day  midodrine, 5 mg, Oral, TID AC  pantoprazole, 40 mg, Oral, Q AM  rOPINIRole, 2.75 mg, Oral, Q8H  sodium chloride, 10 mL, Intravenous, Q12H  sucralfate, 1 g, Oral, 4x Daily AC & at Bedtime             Active Hospital Problems:  Active Hospital Problems    Diagnosis    • **Gastrointestinal hemorrhage, unspecified gastrointestinal hemorrhage type      Plan:      Hematochezia  H/O gastric and duodenal AVMs  - black stool that is guaiac positive in ED, not on any anticoagulation  - Hgb 7.7, baseline appears around 8  - Monitor H&H  - GI Consulted   -Status post scope and treatment of areas of ectasia  - PPI Carafate ordered  -IV iron ordered  -Hemoglobin 7.3 and low blood pressure on May 26.  1 unit of blood ordered.  Fluids given.  Midodrine started.  Monitor overnight  -Back to [facility] tomorrow if stable     Left lower extremity weakness  PVD  - Warm to touch and some swelling  - Venous doppler ordered     Parkinson's Disease  Wheelchair dependent  - Patient reports she has not had physical therapy in years  - Resume Sinemet     Fibromyalgia  -Resume Voltaren, Cymbalta, Norco as needed, Requip and Neurontin  -Hold fentanyl patch as patient is drowsy  -Fall precautions     Hypothyroidism  - Check TSH  - Resume Synthroid     GERD: PPI  Hyperlipidemia: Check lipid panel  Obstructive sleep apnea     DVT prophylaxis:  No DVT prophylaxis order currently exists.     CODE STATUS:    Admission Status:  I believe this patient meets inpatent status.     I discussed the patient's findings and my recommendations with patient and family. 05/26/23      Electronically signed by Kimo  MD Santiago, 05/26/23, 16:36 EDT.  [facility], [place], operated by [facility] Hospitalist Team

## 2023-05-26 NOTE — NURSING NOTE
88-year-old female who presents to the hospital with dark stool.  A consult received to assess patient's left heel.  Patient reports pain and discomfort with the heel.  However the heel is red but blanching at this point in time.  She is encouraged to utilize her waffle soft midline offloading boots that she has some in the room but she reports that she had just removed them to go the bathroom.  Additionally there is a an ulceration to the left lower extremity.  Patient is a poor historian somewhat confused.  She is not able to tell me the current treatment or how long the wound has been present but the wound is approximate size of 2 x 1 cm.  No drainage is noted.  It is fairly dry starting to crust around the edges.  The base of the wound is pink.  It appears to be more middermal but there is a lot of thick scaling to the shin area.  It was difficult to palpate a pedal pulse.  There are no overt symptoms of infection noted to the wound.  Will recommend utilizing a silver silicone foam border dressing and changing every 3 days

## 2023-05-27 VITALS
RESPIRATION RATE: 15 BRPM | SYSTOLIC BLOOD PRESSURE: 113 MMHG | WEIGHT: 211 LBS | BODY MASS INDEX: 41.43 KG/M2 | TEMPERATURE: 98.7 F | HEIGHT: 60 IN | OXYGEN SATURATION: 92 % | DIASTOLIC BLOOD PRESSURE: 51 MMHG | HEART RATE: 89 BPM

## 2023-05-27 LAB
ALBUMIN SERPL-MCNC: 3.4 G/DL (ref 3.5–5.2)
ALBUMIN/GLOB SERPL: 0.9 G/DL
ALP SERPL-CCNC: 81 U/L (ref 39–117)
ALT SERPL W P-5'-P-CCNC: <5 U/L (ref 1–33)
ANION GAP SERPL CALCULATED.3IONS-SCNC: 10 MMOL/L (ref 5–15)
AST SERPL-CCNC: 14 U/L (ref 1–32)
BASOPHILS # BLD AUTO: 0 10*3/MM3 (ref 0–0.2)
BASOPHILS NFR BLD AUTO: 0.7 % (ref 0–1.5)
BH BB BLOOD EXPIRATION DATE: NORMAL
BH BB BLOOD TYPE BARCODE: 6200
BH BB DISPENSE STATUS: NORMAL
BH BB PRODUCT CODE: NORMAL
BH BB UNIT NUMBER: NORMAL
BILIRUB SERPL-MCNC: 0.5 MG/DL (ref 0–1.2)
BUN SERPL-MCNC: 25 MG/DL (ref 8–23)
BUN/CREAT SERPL: 28.4 (ref 7–25)
CALCIUM SPEC-SCNC: 8.9 MG/DL (ref 8.6–10.5)
CHLORIDE SERPL-SCNC: 107 MMOL/L (ref 98–107)
CO2 SERPL-SCNC: 26 MMOL/L (ref 22–29)
CREAT SERPL-MCNC: 0.88 MG/DL (ref 0.57–1)
CROSSMATCH INTERPRETATION: NORMAL
DEPRECATED RDW RBC AUTO: 56 FL (ref 37–54)
EGFRCR SERPLBLD CKD-EPI 2021: 64.1 ML/MIN/1.73
EOSINOPHIL # BLD AUTO: 0.3 10*3/MM3 (ref 0–0.4)
EOSINOPHIL NFR BLD AUTO: 5.6 % (ref 0.3–6.2)
ERYTHROCYTE [DISTWIDTH] IN BLOOD BY AUTOMATED COUNT: 16.3 % (ref 12.3–15.4)
GLOBULIN UR ELPH-MCNC: 3.6 GM/DL
GLUCOSE SERPL-MCNC: 88 MG/DL (ref 65–99)
HCT VFR BLD AUTO: 27 % (ref 34–46.6)
HGB BLD-MCNC: 8.8 G/DL (ref 12–15.9)
LYMPHOCYTES # BLD AUTO: 1 10*3/MM3 (ref 0.7–3.1)
LYMPHOCYTES NFR BLD AUTO: 21.6 % (ref 19.6–45.3)
MAGNESIUM SERPL-MCNC: 2.4 MG/DL (ref 1.6–2.4)
MCH RBC QN AUTO: 32.3 PG (ref 26.6–33)
MCHC RBC AUTO-ENTMCNC: 32.7 G/DL (ref 31.5–35.7)
MCV RBC AUTO: 98.8 FL (ref 79–97)
MONOCYTES # BLD AUTO: 0.4 10*3/MM3 (ref 0.1–0.9)
MONOCYTES NFR BLD AUTO: 7.9 % (ref 5–12)
NEUTROPHILS NFR BLD AUTO: 2.9 10*3/MM3 (ref 1.7–7)
NEUTROPHILS NFR BLD AUTO: 64.2 % (ref 42.7–76)
NRBC BLD AUTO-RTO: 0 /100 WBC (ref 0–0.2)
PHOSPHATE SERPL-MCNC: 3.5 MG/DL (ref 2.5–4.5)
PLATELET # BLD AUTO: 197 10*3/MM3 (ref 140–450)
PMV BLD AUTO: 6.8 FL (ref 6–12)
POTASSIUM SERPL-SCNC: 3.9 MMOL/L (ref 3.5–5.2)
PROT SERPL-MCNC: 7 G/DL (ref 6–8.5)
RBC # BLD AUTO: 2.74 10*6/MM3 (ref 3.77–5.28)
SODIUM SERPL-SCNC: 143 MMOL/L (ref 136–145)
UNIT  ABO: NORMAL
UNIT  RH: NORMAL
WBC NRBC COR # BLD: 4.5 10*3/MM3 (ref 3.4–10.8)

## 2023-05-27 PROCEDURE — 94761 N-INVAS EAR/PLS OXIMETRY MLT: CPT

## 2023-05-27 PROCEDURE — 84100 ASSAY OF PHOSPHORUS: CPT | Performed by: INTERNAL MEDICINE

## 2023-05-27 PROCEDURE — 80053 COMPREHEN METABOLIC PANEL: CPT | Performed by: INTERNAL MEDICINE

## 2023-05-27 PROCEDURE — 94664 DEMO&/EVAL PT USE INHALER: CPT

## 2023-05-27 PROCEDURE — 83735 ASSAY OF MAGNESIUM: CPT | Performed by: INTERNAL MEDICINE

## 2023-05-27 PROCEDURE — 85025 COMPLETE CBC W/AUTO DIFF WBC: CPT | Performed by: NURSE PRACTITIONER

## 2023-05-27 PROCEDURE — 94799 UNLISTED PULMONARY SVC/PX: CPT

## 2023-05-27 RX ORDER — MIDODRINE HYDROCHLORIDE 5 MG/1
5 TABLET ORAL
Qty: 90 TABLET | Refills: 0 | Status: SHIPPED | OUTPATIENT
Start: 2023-05-27

## 2023-05-27 RX ORDER — SUCRALFATE 1 G/1
1 TABLET ORAL
Qty: 120 TABLET | Refills: 2 | Status: SHIPPED | OUTPATIENT
Start: 2023-05-27

## 2023-05-27 RX ORDER — PANTOPRAZOLE SODIUM 40 MG/1
40 TABLET, DELAYED RELEASE ORAL
Qty: 30 TABLET | Refills: 2 | Status: SHIPPED | OUTPATIENT
Start: 2023-05-28

## 2023-05-27 RX ADMIN — HYDROCODONE BITARTRATE AND ACETAMINOPHEN 1 TABLET: 10; 325 TABLET ORAL at 11:37

## 2023-05-27 RX ADMIN — DICLOFENAC SODIUM 2 G: 10 GEL TOPICAL at 08:31

## 2023-05-27 RX ADMIN — SUCRALFATE 1 G: 1 TABLET ORAL at 11:37

## 2023-05-27 RX ADMIN — MIDODRINE HYDROCHLORIDE 5 MG: 5 TABLET ORAL at 08:31

## 2023-05-27 RX ADMIN — FERROUS SULFATE TAB EC 324 MG (65 MG FE EQUIVALENT) 324 MG: 324 (65 FE) TABLET DELAYED RESPONSE at 08:31

## 2023-05-27 RX ADMIN — ALLOPURINOL 100 MG: 100 TABLET ORAL at 08:31

## 2023-05-27 RX ADMIN — PANTOPRAZOLE SODIUM 40 MG: 40 TABLET, DELAYED RELEASE ORAL at 05:53

## 2023-05-27 RX ADMIN — ROPINIROLE HYDROCHLORIDE 2.75 MG: 1 TABLET, FILM COATED ORAL at 05:53

## 2023-05-27 RX ADMIN — GABAPENTIN 300 MG: 300 CAPSULE ORAL at 08:31

## 2023-05-27 RX ADMIN — FUROSEMIDE 40 MG: 40 TABLET ORAL at 08:31

## 2023-05-27 RX ADMIN — SUCRALFATE 1 G: 1 TABLET ORAL at 08:31

## 2023-05-27 RX ADMIN — Medication 400 MG: at 08:31

## 2023-05-27 RX ADMIN — LEVOTHYROXINE SODIUM 137 MCG: 0.11 TABLET ORAL at 05:53

## 2023-05-27 RX ADMIN — CARBIDOPA AND LEVODOPA 2 TABLET: 25; 100 TABLET ORAL at 08:31

## 2023-05-27 RX ADMIN — BUDESONIDE AND FORMOTEROL FUMARATE DIHYDRATE 2 PUFF: 160; 4.5 AEROSOL RESPIRATORY (INHALATION) at 07:55

## 2023-05-27 RX ADMIN — MIDODRINE HYDROCHLORIDE 5 MG: 5 TABLET ORAL at 11:37

## 2023-05-27 RX ADMIN — Medication 10 ML: at 08:31

## 2023-05-27 RX ADMIN — HYDROCODONE BITARTRATE AND ACETAMINOPHEN 1 TABLET: 10; 325 TABLET ORAL at 06:00

## 2023-05-27 RX ADMIN — ACETAMINOPHEN 650 MG: 325 TABLET, FILM COATED ORAL at 08:31

## 2023-05-27 NOTE — DISCHARGE SUMMARY
[facility] Medicine Services  DISCHARGE SUMMARY    Patient Name: Lorenza Owens  : 1935  MRN: [id number]    Discharge condition: Stabilized  Date of Admission: 2023  Discharge Diagnosis: Acute blood loss anemia, hemorrhagic shock  Date of Discharge: 2023  Primary Care Physician: Shikha Mcmahon APRN      Presenting Problem:   Gastrointestinal hemorrhage, unspecified gastrointestinal hemorrhage type [K92.2]    Active and Resolved Hospital Problems:  Active Hospital Problems    Diagnosis POA   • **Gastrointestinal hemorrhage, unspecified gastrointestinal hemorrhage type [K92.2] Yes      Resolved Hospital Problems   No resolved problems to display.         Hospital Course     Hospital Course:  Lorenza Owens is a 88 y.o. female who presented to the hospital with dark stools and low hemoglobin.  Patient was seen by gastroenterology and taken for scope.  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  She was treated with APC cautery.  Patient was monitored and required another transfusion.  Her blood pressure was somewhat low and hence she was placed on midodrine and given some IV fluids as well as a final unit of blood.  Once her hemoglobin and blood pressure stabilized she was discharged back to [facility].              Reasons For Change In Medications and Indications for New Medications:      Day of Discharge     Vital Signs:  Temp:  [97.2 °F (36.2 °C)-98.7 °F (37.1 °C)] 98.7 °F (37.1 °C)  Heart Rate:  [73-89] 89  Resp:  [12-22] 15  BP: (100-124)/(41-57) 113/51    Physical Exam:  Physical Exam  Vitals reviewed.   HENT:      Head: Normocephalic.      Nose: Nose normal.      Mouth/Throat:      Mouth: Mucous membranes are moist.   Cardiovascular:      Rate and Rhythm: Normal rate and regular rhythm.      Pulses: Normal pulses.      Heart sounds: Normal heart sounds.   Pulmonary:      Effort: Pulmonary effort is  normal.      Breath sounds: Normal breath sounds.   Abdominal:      General: Abdomen is flat.      Palpations: Abdomen is soft.   Musculoskeletal:         General: Normal range of motion.      Cervical back: Normal range of motion.   Skin:     General: Skin is warm.   Neurological:      General: No focal deficit present.      Mental Status: She is alert and oriented to person, place, and time.   Psychiatric:         Mood and Affect: Mood normal.         Behavior: Behavior normal.            Pertinent  and/or Most Recent Results     LAB RESULTS:      Lab 05/27/23 0617 05/26/23 2012 05/26/23 0528 05/25/23 1932 05/25/23 0620 05/24/23 1910 05/24/23  1239   WBC 4.50  --  3.50  --  3.20*  --  4.10   HEMOGLOBIN 8.8* 8.5* 7.3* 7.2* 7.4*   < > 7.7*   HEMATOCRIT 27.0* 26.0* 22.3* 21.6* 22.2*   < > 23.0*   PLATELETS 197  --  166  --  161  --  181   NEUTROS ABS 2.90  --  2.50  --  1.80  --  2.70   LYMPHS ABS 1.00  --  0.60*  --  0.90  --  0.80   MONOS ABS 0.40  --  0.30  --  0.30  --  0.30   EOS ABS 0.30  --  0.10  --  0.20  --  0.20   MCV 98.8*  --  97.8*  --  97.3*  --  97.7*   SED RATE  --   --   --   --  25  --   --    CRP  --   --   --   --  1.21*  --   --    PROCALCITONIN  --   --   --   --  0.11  --   --    PROTIME  --   --   --   --  11.0  --  11.2   APTT  --   --   --   --   --   --  29.0*    < > = values in this interval not displayed.         Lab 05/27/23 0617 05/26/23 0528 05/25/23 0620 05/24/23 1910 05/24/23  1239   SODIUM 143 138 140  --  141   POTASSIUM 3.9 4.0 4.4  --  4.2   CHLORIDE 107 102 105  --  107   CO2 26.0 27.0 27.0  --  24.0   ANION GAP 10.0 9.0 8.0  --  10.0   BUN 25* 30* 30*  --  26*   CREATININE 0.88 0.96 0.94  --  0.87   EGFR 64.1 57.7* 59.2*  --  65.0   GLUCOSE 88 94 85  --  110*   CALCIUM 8.9 8.5* 8.5*  --  8.5*   MAGNESIUM 2.4 2.4 2.2  --   --    PHOSPHORUS 3.5 4.9* 4.9*  --   --    HEMOGLOBIN A1C  --   --   --  4.40*  --    TSH  --   --   --  2.740  --          Lab 05/27/23  0653  05/26/23  0528 05/25/23  0620 05/24/23  1239   TOTAL PROTEIN 7.0 6.7 6.4 6.2   ALBUMIN 3.4* 3.3* 3.1* 3.2*   GLOBULIN 3.6 3.4 3.3 3.0   ALT (SGPT) <5 <5 <5 <5   AST (SGOT) 14 13 18 12   BILIRUBIN 0.5 0.4 0.4 0.5   ALK PHOS 81 80 78 82         Lab 05/25/23  0620 05/24/23  1459 05/24/23  1239   HSTROP T  --  17* 17*   PROTIME 11.0  --  11.2   INR 1.03  --  1.05         Lab 05/24/23  1910   CHOLESTEROL 142   LDL CHOL 75   HDL CHOL 36*   TRIGLYCERIDES 182*         Lab 05/24/23  1459 05/24/23  1239   IRON  --  63   IRON SATURATION  --  18*   TIBC  --  347   TRANSFERRIN  --  233   FERRITIN  --  57.38   FOLATE 11.40  --    VITAMIN B 12 945  --    ABO TYPING  --  A   RH TYPING  --  Positive   ANTIBODY SCREEN  --  Negative         Brief Urine Lab Results  (Last result in the past 365 days)      Color   Clarity   Blood   Leuk Est   Nitrite   Protein   CREAT   Urine HCG        02/21/23 2200 Dark Yellow   Cloudy   Negative   Moderate (2+)   Negative   Negative               Microbiology Results (last 10 days)     ** No results found for the last 240 hours. **          XR Chest 1 View    Result Date: 5/24/2023  Impression: No acute infiltrate. Linear atelectasis versus scarring of the left lung base unchanged from 10/22/2022 Electronically Signed: Sai Sanders  5/24/2023 7:04 PM EDT  Workstation ID: [id number]      Results for orders placed during the hospital encounter of 05/24/23    Duplex Venous Lower Extremity - Bilateral CAR    Interpretation Summary  •  Normal bilateral lower extremity venous duplex scan.      Results for orders placed during the hospital encounter of 05/24/23    Duplex Venous Lower Extremity - Bilateral CAR    Interpretation Summary  •  Normal bilateral lower extremity venous duplex scan.      Results for orders placed during the hospital encounter of 05/10/21    Adult Transthoracic Echo Complete W/ Cont if Necessary Per Protocol    Interpretation Summary  · Estimated left ventricular EF = 75% Estimated  left ventricular EF was in agreement with the calculated left ventricular EF. Left ventricular systolic function is hyperdynamic (EF > 70%).  · Mild aortic valve stenosis is present.  · Moderate tricuspid valve regurgitation is present.  · Estimated right ventricular systolic pressure from tricuspid regurgitation is moderately elevated (45-55 mmHg).  · Moderate pulmonary hypertension is present.  · Left ventricular diastolic dysfunction is noted.      Labs Pending at Discharge:      Procedures Performed  Procedure(s):  ESOPHAGOGASTRODUODENOSCOPY WITH SMALL BOWEL ENTEROSCOPY AND APC OF GAVE  05/25 1351 SMALL BOWEL ENTEROSCOPY      Consults:   Consults     Date and Time Order Name Status Description    5/24/2023  1:29 PM Gastroenterology (on-call MD unless specified) Completed     5/24/2023  1:22 PM Hospitalist (on-call MD unless specified)              Discharge Details        Discharge Medications      New Medications      Instructions Start Date   midodrine 5 MG tablet  Commonly known as: PROAMATINE   5 mg, Oral, 3 Times Daily Before Meals      pantoprazole 40 MG EC tablet  Commonly known as: PROTONIX   40 mg, Oral, Every Early Morning   Start Date: May 28, 2023     sucralfate 1 g tablet  Commonly known as: CARAFATE   1 g, Oral, 4 Times Daily Before Meals & Nightly         Continue These Medications      Instructions Start Date   acetaminophen 325 MG tablet  Commonly known as: TYLENOL   650 mg, Oral, Every 8 Hours PRN      albuterol sulfate  (90 Base) MCG/ACT inhaler  Commonly known as: PROVENTIL HFA;VENTOLIN HFA;PROAIR HFA   2 puffs, Inhalation, Every 4 Hours PRN      allopurinol 100 MG tablet  Commonly known as: ZYLOPRIM   100 mg, Oral, Daily      benzonatate 100 MG capsule  Commonly known as: TESSALON   100 mg, Oral, 3 Times Daily PRN      bisacodyl 10 MG suppository  Commonly known as: DULCOLAX   10 mg, Rectal, As Needed      carbidopa-levodopa  MG per tablet  Commonly known as: SINEMET   2  tablets, Oral, 3 Times Daily      cetirizine 10 MG tablet  Commonly known as: zyrTEC   10 mg, Oral, Every Night at Bedtime      cyanocobalamin 1000 MCG/ML injection   1,000 mcg, Intramuscular, Every 30 Days      CYMBALTA PO   60 mg, Oral, Every Night at Bedtime      Diclofenac Sodium 1 % gel gel  Commonly known as: VOLTAREN   2 g, Topical, 2 Times Daily, Bilateral knees      epoetin enedelia-epbx 19884 UNIT/ML injection  Commonly known as: RETACRIT   40,000 Units, Subcutaneous, Weekly, Tuesday      fentaNYL 25 MCG/HR patch  Commonly known as: DURAGESIC   1 patch, Transdermal, Every 72 Hours      ferrous sulfate 324 (65 Fe) MG tablet delayed-release EC tablet   324 mg, Oral, Daily With Breakfast      fluticasone 50 MCG/ACT nasal spray  Commonly known as: FLONASE   2 sprays, Nasal, Daily      fluticasone-salmeterol 250-50 MCG/DOSE DISKUS  Commonly known as: ADVAIR   1 puff, Inhalation, 2 Times Daily - RT      furosemide 20 MG tablet  Commonly known as: LASIX   40 mg, Oral, Daily      gabapentin 300 MG capsule  Commonly known as: NEURONTIN   300 mg, Oral, 3 Times Daily      guaiFENesin 100 MG/5ML syrup  Commonly known as: ROBITUSSIN   600 mg, Oral, Every 6 Hours PRN      HYDROcodone-acetaminophen  MG per tablet  Commonly known as: NORCO   1 tablet, Oral, Every 6 Hours      hydrocortisone 1 % cream   1 application, Topical, 2 Times Daily      levothyroxine 137 MCG tablet  Commonly known as: SYNTHROID, LEVOTHROID   137 mcg, Oral, Daily      liver oil-zinc oxide 40 % ointment  Commonly known as: DESITIN   1 application, Topical, Every 12 Hours PRN      lubiprostone 24 MCG capsule  Commonly known as: AMITIZA   24 mcg, Oral, 2 Times Daily With Meals      magnesium oxide 400 (241.3 Mg) MG tablet tablet  Commonly known as: MAGOX   400 mg, Oral, Every Other Day      melatonin 5 MG tablet tablet   5 mg, Oral      nystatin 418874 UNIT/GM powder  Commonly known as: MYCOSTATIN   1 application, Topical, As Needed, Apply to  abdominal rash      polyethylene glycol packet  Commonly known as: MIRALAX   17 g, Oral, Every Morning      potassium chloride 10 MEQ CR tablet  Commonly known as: K-DUR,KLOR-CON   10 mEq, Oral, Daily      Requip XL 8 MG 24 hr tablet  Generic drug: rOPINIRole XL   8 mg, Oral, Nightly      senna 8.6 MG tablet  Commonly known as: SENOKOT   1 tablet, Oral, 2 Times Daily      simethicone 80 MG chewable tablet  Commonly known as: MYLICON   80 mg, Oral, Every 6 Hours PRN      vitamin D 1.25 MG (96439 UT) capsule capsule  Commonly known as: ERGOCALCIFEROL   50,000 Units, Oral, Weekly, Monday         Stop These Medications    esomeprazole 20 MG capsule  Commonly known as: nexIUM            Allergies   Allergen Reactions   • Latex Rash   • Morphine Confusion         Discharge Disposition:   Home or Self Care    Diet:  Hospital:  Diet Order   Procedures   • Diet: Cardiac Diets; Healthy Heart (2-3 Na+); Texture: Regular Texture (IDDSI 7); Fluid Consistency: Thin (IDDSI 0)         Discharge Activity:         CODE STATUS:  Code Status and Medical Interventions:   Ordered at: 05/24/23 3338     Level Of Support Discussed With:    Patient     Code Status (Patient has no pulse and is not breathing):    No CPR (Do Not Attempt to Resuscitate)     Medical Interventions (Patient has pulse or is breathing):    Full Support         No future appointments.        Time spent on Discharge including face to face service:  45 minutes    This patient has been examined wearing appropriate Personal Protective Equipment and discussed with Patient. 05/27/23      Signature: Kimo Henderson MD

## 2023-05-27 NOTE — PLAN OF CARE
Problem: Adult Inpatient Plan of Care  Goal: Plan of Care Review  Outcome: Ongoing, Progressing  Flowsheets (Taken 5/27/2023 0357)  Progress: no change  Plan of Care Reviewed With: patient  Goal: Patient-Specific Goal (Individualized)  Outcome: Ongoing, Progressing  Goal: Absence of Hospital-Acquired Illness or Injury  Outcome: Ongoing, Progressing  Intervention: Identify and Manage Fall Risk  Recent Flowsheet Documentation  Taken 5/27/2023 0200 by Thierno Wilcox RN  Safety Promotion/Fall Prevention: safety round/check completed  Taken 5/27/2023 0000 by Thierno Wilcox RN  Safety Promotion/Fall Prevention: safety round/check completed  Taken 5/26/2023 2255 by Thierno Wilcox RN  Safety Promotion/Fall Prevention: safety round/check completed  Taken 5/26/2023 2000 by Thierno Wilcox RN  Safety Promotion/Fall Prevention:   safety round/check completed   room organization consistent   nonskid shoes/slippers when out of bed   assistive device/personal items within reach   fall prevention program maintained  Goal: Optimal Comfort and Wellbeing  Outcome: Ongoing, Progressing  Intervention: Provide Person-Centered Care  Recent Flowsheet Documentation  Taken 5/26/2023 2000 by Thierno Wilcox RN  Trust Relationship/Rapport:   care explained   choices provided  Goal: Readiness for Transition of Care  Outcome: Ongoing, Progressing     Problem: Skin Injury Risk Increased  Goal: Skin Health and Integrity  Outcome: Ongoing, Progressing     Problem: Fall Injury Risk  Goal: Absence of Fall and Fall-Related Injury  Outcome: Ongoing, Progressing  Intervention: Promote Injury-Free Environment  Recent Flowsheet Documentation  Taken 5/27/2023 0200 by Thierno Wilcox RN  Safety Promotion/Fall Prevention: safety round/check completed  Taken 5/27/2023 0000 by Thierno Wilcox RN  Safety Promotion/Fall Prevention: safety round/check completed  Taken 5/26/2023 2255 by Thierno Wilcox RN  Safety Promotion/Fall Prevention: safety round/check  completed  Taken 5/26/2023 2000 by Thierno Wilcox RN  Safety Promotion/Fall Prevention:   safety round/check completed   room organization consistent   nonskid shoes/slippers when out of bed   assistive device/personal items within reach   fall prevention program maintained   Goal Outcome Evaluation:  Plan of Care Reviewed With: patient        Progress: no change     Pt c/o pain resolved with PRN pain medication. Pt rested throughout the night. No new issues addressed at this time. Will continue to monitor.

## 2023-05-27 NOTE — SIGNIFICANT NOTE
Case Management Discharge Note      Final Note: (P) d/c to [facility]         Selected Continued Care - Discharged on 5/27/2023 Admission date: 5/24/2023 - Discharge disposition: Long Term Care (DC - External)      Destination Coordination complete.      Service Provider Selected Services Address Phone Fax Patient Preferred    [facility] [place] [address] [address] [place] [address] [phone] --                          Final Discharge Disposition Code: (P) 04 - intermediate care facility

## 2023-06-19 DIAGNOSIS — D64.9 SEVERE ANEMIA: Primary | ICD-10-CM

## 2023-06-19 DIAGNOSIS — D64.9 ANEMIA, UNSPECIFIED TYPE: Primary | ICD-10-CM

## (undated) DEVICE — FIAPC® PROBE W/ FILTER 2200 SC OD 2.3MM/6.9FR; L 2.2M/7.2FT: Brand: ERBE

## (undated) DEVICE — BITEBLOCK ENDO W/STRAP 60F A/ LF DISP

## (undated) DEVICE — PK ENDO GI 50

## (undated) DEVICE — FIAPC® PROBE W/ FILTER 2200 A OD 2.3MM/6.9FR; L 2.2M/7.2FT: Brand: ERBE

## (undated) DEVICE — ERBE NESSY®PLATE 170 SPLIT; 168CM²; CABLE 3M: Brand: ERBE

## (undated) DEVICE — PAPR PRNT PK SONY W RIBN UPC55

## (undated) DEVICE — PAD, GROUNDING, UNIVERSAL, SPLIT, 9': Brand: MEDLINE